# Patient Record
Sex: FEMALE | Race: WHITE | Employment: OTHER | URBAN - METROPOLITAN AREA
[De-identification: names, ages, dates, MRNs, and addresses within clinical notes are randomized per-mention and may not be internally consistent; named-entity substitution may affect disease eponyms.]

---

## 2017-08-23 ENCOUNTER — CARE COORDINATION (OUTPATIENT)
Dept: FAMILY MEDICINE CLINIC | Age: 62
End: 2017-08-23

## 2017-09-20 ENCOUNTER — CARE COORDINATION (OUTPATIENT)
Dept: FAMILY MEDICINE CLINIC | Age: 62
End: 2017-09-20

## 2019-06-23 ENCOUNTER — HOSPITAL ENCOUNTER (EMERGENCY)
Age: 64
Discharge: HOME | End: 2019-06-23
Payer: MEDICARE

## 2019-06-23 ENCOUNTER — APPOINTMENT (OUTPATIENT)
Dept: CT IMAGING | Age: 64
End: 2019-06-23
Payer: MEDICARE

## 2019-06-23 VITALS
TEMPERATURE: 98.3 F | HEART RATE: 90 BPM | BODY MASS INDEX: 28.52 KG/M2 | SYSTOLIC BLOOD PRESSURE: 158 MMHG | HEIGHT: 62 IN | WEIGHT: 155 LBS | RESPIRATION RATE: 18 BRPM | DIASTOLIC BLOOD PRESSURE: 82 MMHG | OXYGEN SATURATION: 94 %

## 2019-06-23 DIAGNOSIS — R19.7 NAUSEA VOMITING AND DIARRHEA: ICD-10-CM

## 2019-06-23 DIAGNOSIS — N30.00 ACUTE CYSTITIS WITHOUT HEMATURIA: Primary | ICD-10-CM

## 2019-06-23 DIAGNOSIS — R11.2 NAUSEA VOMITING AND DIARRHEA: ICD-10-CM

## 2019-06-23 DIAGNOSIS — R73.9 HYPERGLYCEMIA: ICD-10-CM

## 2019-06-23 LAB
A/G RATIO: 1.1 (ref 1.1–2.2)
ALBUMIN SERPL-MCNC: 4 G/DL (ref 3.4–5)
ALP BLD-CCNC: 117 U/L (ref 40–129)
ALT SERPL-CCNC: 13 U/L (ref 10–40)
ANION GAP SERPL CALCULATED.3IONS-SCNC: 13 MMOL/L (ref 3–16)
AST SERPL-CCNC: 13 U/L (ref 15–37)
BACTERIA: ABNORMAL /HPF
BASOPHILS ABSOLUTE: 0.1 K/UL (ref 0–0.2)
BASOPHILS RELATIVE PERCENT: 1.5 %
BILIRUB SERPL-MCNC: 0.4 MG/DL (ref 0–1)
BILIRUBIN URINE: NEGATIVE
BLOOD, URINE: ABNORMAL
BUN BLDV-MCNC: 18 MG/DL (ref 7–20)
CALCIUM SERPL-MCNC: 9.4 MG/DL (ref 8.3–10.6)
CHLORIDE BLD-SCNC: 92 MMOL/L (ref 99–110)
CHP ED QC CHECK: NORMAL
CLARITY: ABNORMAL
CO2: 25 MMOL/L (ref 21–32)
COLOR: YELLOW
CREAT SERPL-MCNC: 0.6 MG/DL (ref 0.6–1.2)
EOSINOPHILS ABSOLUTE: 0.1 K/UL (ref 0–0.6)
EOSINOPHILS RELATIVE PERCENT: 2 %
EPITHELIAL CELLS, UA: ABNORMAL /HPF
GFR AFRICAN AMERICAN: >60
GFR NON-AFRICAN AMERICAN: >60
GLOBULIN: 3.7 G/DL
GLUCOSE BLD-MCNC: 389 MG/DL
GLUCOSE BLD-MCNC: 389 MG/DL (ref 70–99)
GLUCOSE BLD-MCNC: 511 MG/DL (ref 70–99)
GLUCOSE URINE: >=1000 MG/DL
HCT VFR BLD CALC: 36.3 % (ref 36–48)
HEMOGLOBIN: 12.1 G/DL (ref 12–16)
KETONES, URINE: ABNORMAL MG/DL
LEUKOCYTE ESTERASE, URINE: ABNORMAL
LIPASE: 6 U/L (ref 13–60)
LYMPHOCYTES ABSOLUTE: 1.6 K/UL (ref 1–5.1)
LYMPHOCYTES RELATIVE PERCENT: 23.9 %
MCH RBC QN AUTO: 28.2 PG (ref 26–34)
MCHC RBC AUTO-ENTMCNC: 33.4 G/DL (ref 31–36)
MCV RBC AUTO: 84.4 FL (ref 80–100)
MICROSCOPIC EXAMINATION: YES
MONOCYTES ABSOLUTE: 0.5 K/UL (ref 0–1.3)
MONOCYTES RELATIVE PERCENT: 6.8 %
NEUTROPHILS ABSOLUTE: 4.5 K/UL (ref 1.7–7.7)
NEUTROPHILS RELATIVE PERCENT: 65.8 %
NITRITE, URINE: NEGATIVE
PDW BLD-RTO: 14.9 % (ref 12.4–15.4)
PERFORMED ON: ABNORMAL
PH UA: 7 (ref 5–8)
PLATELET # BLD: 244 K/UL (ref 135–450)
PMV BLD AUTO: 7.8 FL (ref 5–10.5)
POTASSIUM REFLEX MAGNESIUM: 4.5 MMOL/L (ref 3.5–5.1)
PROTEIN UA: 100 MG/DL
RBC # BLD: 4.3 M/UL (ref 4–5.2)
RBC UA: ABNORMAL /HPF (ref 0–2)
SODIUM BLD-SCNC: 130 MMOL/L (ref 136–145)
SPECIFIC GRAVITY UA: 1.01 (ref 1–1.03)
TOTAL PROTEIN: 7.7 G/DL (ref 6.4–8.2)
URINE REFLEX TO CULTURE: YES
URINE TYPE: ABNORMAL
UROBILINOGEN, URINE: 0.2 E.U./DL
WBC # BLD: 6.8 K/UL (ref 4–11)
WBC UA: ABNORMAL /HPF (ref 0–5)

## 2019-06-23 PROCEDURE — 2580000003 HC RX 258: Performed by: NURSE PRACTITIONER

## 2019-06-23 PROCEDURE — 80053 COMPREHEN METABOLIC PANEL: CPT

## 2019-06-23 PROCEDURE — 87086 URINE CULTURE/COLONY COUNT: CPT

## 2019-06-23 PROCEDURE — 85025 COMPLETE CBC W/AUTO DIFF WBC: CPT

## 2019-06-23 PROCEDURE — 6370000000 HC RX 637 (ALT 250 FOR IP): Performed by: NURSE PRACTITIONER

## 2019-06-23 PROCEDURE — 96374 THER/PROPH/DIAG INJ IV PUSH: CPT

## 2019-06-23 PROCEDURE — 96375 TX/PRO/DX INJ NEW DRUG ADDON: CPT

## 2019-06-23 PROCEDURE — 87186 SC STD MICRODIL/AGAR DIL: CPT

## 2019-06-23 PROCEDURE — 81001 URINALYSIS AUTO W/SCOPE: CPT

## 2019-06-23 PROCEDURE — 74176 CT ABD & PELVIS W/O CONTRAST: CPT

## 2019-06-23 PROCEDURE — 96361 HYDRATE IV INFUSION ADD-ON: CPT

## 2019-06-23 PROCEDURE — 96372 THER/PROPH/DIAG INJ SC/IM: CPT

## 2019-06-23 PROCEDURE — 99284 EMERGENCY DEPT VISIT MOD MDM: CPT

## 2019-06-23 PROCEDURE — 83690 ASSAY OF LIPASE: CPT

## 2019-06-23 PROCEDURE — 6360000002 HC RX W HCPCS: Performed by: NURSE PRACTITIONER

## 2019-06-23 PROCEDURE — 87077 CULTURE AEROBIC IDENTIFY: CPT

## 2019-06-23 RX ORDER — ONDANSETRON 2 MG/ML
4 INJECTION INTRAMUSCULAR; INTRAVENOUS ONCE
Status: COMPLETED | OUTPATIENT
Start: 2019-06-23 | End: 2019-06-23

## 2019-06-23 RX ORDER — SULFAMETHOXAZOLE AND TRIMETHOPRIM 800; 160 MG/1; MG/1
1 TABLET ORAL 2 TIMES DAILY
Qty: 14 TABLET | Refills: 0 | Status: SHIPPED | OUTPATIENT
Start: 2019-06-23 | End: 2019-06-30

## 2019-06-23 RX ORDER — 0.9 % SODIUM CHLORIDE 0.9 %
1000 INTRAVENOUS SOLUTION INTRAVENOUS ONCE
Status: COMPLETED | OUTPATIENT
Start: 2019-06-23 | End: 2019-06-23

## 2019-06-23 RX ORDER — MORPHINE SULFATE 4 MG/ML
4 INJECTION, SOLUTION INTRAMUSCULAR; INTRAVENOUS ONCE
Status: COMPLETED | OUTPATIENT
Start: 2019-06-23 | End: 2019-06-23

## 2019-06-23 RX ORDER — BUSPIRONE HYDROCHLORIDE 10 MG/1
10 TABLET ORAL 3 TIMES DAILY
Status: ON HOLD | COMMUNITY
End: 2021-11-24 | Stop reason: HOSPADM

## 2019-06-23 RX ORDER — BLOOD-GLUCOSE METER
1 EACH MISCELLANEOUS DAILY
Qty: 1 KIT | Refills: 0 | Status: ON HOLD | OUTPATIENT
Start: 2019-06-23 | End: 2019-07-26 | Stop reason: HOSPADM

## 2019-06-23 RX ORDER — PHENAZOPYRIDINE HYDROCHLORIDE 200 MG/1
200 TABLET, FILM COATED ORAL 3 TIMES DAILY PRN
Qty: 6 TABLET | Refills: 0 | Status: SHIPPED | OUTPATIENT
Start: 2019-06-23 | End: 2019-06-26

## 2019-06-23 RX ORDER — PHENAZOPYRIDINE HYDROCHLORIDE 100 MG/1
200 TABLET, FILM COATED ORAL ONCE
Status: COMPLETED | OUTPATIENT
Start: 2019-06-23 | End: 2019-06-23

## 2019-06-23 RX ADMIN — ONDANSETRON 4 MG: 2 INJECTION INTRAMUSCULAR; INTRAVENOUS at 14:09

## 2019-06-23 RX ADMIN — MORPHINE SULFATE 4 MG: 4 INJECTION INTRAVENOUS at 14:07

## 2019-06-23 RX ADMIN — SODIUM CHLORIDE 1000 ML: 9 INJECTION, SOLUTION INTRAVENOUS at 14:12

## 2019-06-23 RX ADMIN — INSULIN HUMAN 10 UNITS: 100 INJECTION, SOLUTION PARENTERAL at 15:44

## 2019-06-23 RX ADMIN — PHENAZOPYRIDINE HYDROCHLORIDE 200 MG: 100 TABLET ORAL at 15:46

## 2019-06-23 ASSESSMENT — ENCOUNTER SYMPTOMS
ABDOMINAL PAIN: 1
NAUSEA: 1
VOMITING: 1
SHORTNESS OF BREATH: 0
BACK PAIN: 0
DIARRHEA: 1
COUGH: 0

## 2019-06-23 ASSESSMENT — PAIN DESCRIPTION - PAIN TYPE: TYPE: ACUTE PAIN

## 2019-06-23 ASSESSMENT — PAIN SCALES - GENERAL
PAINLEVEL_OUTOF10: 7
PAINLEVEL_OUTOF10: 8

## 2019-06-23 NOTE — ED NOTES
Reviewed discharge instructions with pt, verbalized understanding,  Denies questions at this time. Ambulated off unit without assistance.       Lisa Dickens RN  06/23/19 8941

## 2019-06-23 NOTE — ED PROVIDER NOTES
[address] ED  eMERGENCY dEPARTMENT eNCOUnter        Pt Name: Ross Babinski  MRN: [id number]  [place] 1955  Date of evaluation: 6/23/2019  Provider: MOHAN Gomez CNP  PCP: Arlyn Harper DO  ED Attending: No att. providers found    [address] [place] [address]       Chief Complaint   Patient presents with    Urinary Tract Infection     Pt states she has a really super bad infection in her urine burns so bad to urinate and for about 30 minutes afterwards    Diarrhea     pt also states that she has an infection in her bowels, states she was to start medicine 3 days ago but hasnt yet. Pt states she is post op 6 mos from toe amputation and has used recent atbs. HISTORY OF PRESENT ILLNESS   (Location/Symptom, Timing/Onset, Context/Setting, Quality, Duration, Modifying Factors, Severity)  Note limiting factors. Ross Babinski is a 60 y.o. female presents to the emergency department with complaints of dysuria and abdominal pain. Patient reports that for the last 4 days she has had burning with urination as well as urinary frequency and urgency. She recently moved back here from [place]. She reports that 2 weeks ago she was seen at [facility] in [facility] and was told that she had a bowel infection. She states that she signed out [address] as she did not wish to be admitted at that time. She was not given any antibiotics at discharge. She reports continued diarrhea, nausea and vomiting. She also reports 7 out of 10 abdominal pain. She does report chills but no measured fevers. She also had toe amputations of the right foot about 4 months ago. Patient does take Percocet for pain and reports that she ran out of her Percocet yesterday. Nursing Notes were all reviewed and agreed with or any disagreements were addressed  in the HPI.     REVIEW OF SYSTEMS    (2-9 systems for level 4, 10 or more for level 5)     Review of Systems   Constitutional: Positive for appetite change, chills and fatigue. Negative for fever. HENT: Negative. Respiratory: Negative for cough and shortness of breath. Cardiovascular: Negative for chest pain. Gastrointestinal: Positive for abdominal pain, diarrhea, nausea and vomiting. Genitourinary: Positive for dysuria, frequency, hematuria and urgency. Negative for pelvic pain. Musculoskeletal: Negative for back pain. Skin: Negative. Neurological: Negative for dizziness, weakness and headaches. Psychiatric/Behavioral: Negative. Positives and Pertinent negatives as per HPI. Except as noted abovein the ROS, all other systems were reviewed and negative. PAST MEDICAL HISTORY     Past Medical History:   Diagnosis Date    Bipolar affective disorder ([facility] 9/28/2015    Depression     [facility]     Hyperlipidemia     Hypertension     Insomnia     Neuropathy (HCC)          SURGICAL HISTORY     Past Surgical History:   Procedure Laterality Date    CHOLECYSTECTOMY           CURRENTMEDICATIONS       Previous Medications    ASPIRIN 325 MG TABLET    Take 1 tablet by mouth daily    BUSPIRONE (BUSPAR) 10 MG TABLET    Take 10 mg by mouth 3 times daily    CARBAMAZEPINE (TEGRETOL) 200 MG TABLET    Take 1 tablet by mouth 3 times daily    CLOTRIMAZOLE-BETAMETHASONE (LOTRISONE) 1-0.05 % CREAM    Apply externally 2 times daily for 2 weeks. CYCLOBENZAPRINE (FLEXERIL) 10 MG TABLET    Take 1 tablet by mouth daily as needed for Muscle spasms    FLUOXETINE (PROZAC) 20 MG TABLET    Take 1 tablet by mouth daily    FUROSEMIDE (LASIX) 40 MG TABLET    Take 1 tablet by mouth daily    GABAPENTIN (NEURONTIN) 800 MG TABLET    Take 1 tablet by mouth 4 times daily    GLUCOSE BLOOD VI TEST STRIPS (EVA CONTOUR TEST) STRIP    1 each by In Vitro route daily As needed.     HYDROCODONE-ACETAMINOPHEN (NORCO) 5-325 MG PER TABLET    Take 1 tablet by mouth every 6 hours as needed for Pain    HYDROXYZINE (ATARAX) 25 MG TABLET    Take 1 tablet by mouth three times daily    INSULIN ASPART (NOVOLOG FLEXPEN) 100 UNIT/ML INJECTION PEN    60-70 units before meals    INSULIN ASPART PROTAMINE-INSULIN ASPART (NOVOLOG MIX 70/30) (70-30) 100 UNIT/ML INJECTION    INJECT 24 UNITS SUBCUTANEOUSLY TWO TIMES A DAY    INSULIN PEN NEEDLE 32G X 4 MM MISC    4 times a day    LISINOPRIL (PRINIVIL;ZESTRIL) 40 MG TABLET    Take 1 tablet by mouth daily    OMEPRAZOLE (PRILOSEC) 20 MG DELAYED RELEASE CAPSULE    Take 1 capsule by mouth Daily    OXYCODONE-ACETAMINOPHEN (PERCOCET) 7.5-325 MG PER TABLET    Take 1 tablet by mouth daily as needed for Pain . PRAVASTATIN (PRAVACHOL) 40 MG TABLET    TAKE ONE TABLET BY MOUTH DAILY    PROMETHAZINE (PHENERGAN) 25 MG TABLET    Take 1 tablet by mouth every 6 hours as needed for Nausea    QUETIAPINE (SEROQUEL) 300 MG TABLET    TAKE ONE TABLET BY MOUTH ONCE NIGHTLY    TRAZODONE (DESYREL) 150 MG TABLET    Take 1 tablet by mouth nightly         ALLERGIES     Iv contrast [iodides] and Pcn [penicillins]    FAMILYHISTORY       Family History   Problem Relation Age of Onset    Heart Disease Mother     Heart Disease Father           SOCIAL HISTORY       Social History     Socioeconomic History    Marital status:       Spouse name: Not on file    Number of children: Not on file    Years of education: Not on file    Highest education level: Not on file   Occupational History    Not on file   Social Needs    Financial resource strain: Not on file    Food insecurity:     Worry: Not on file     Inability: Not on file    Transportation needs:     Medical: Not on file     Non-medical: Not on file   Tobacco Use    Smoking status: Current Every Day Smoker     Packs/day: 1.00     Years: 40.00     Pack years: 40.00    Smokeless tobacco: Never Used   Substance and Sexual Activity    Alcohol use: No    Drug use: No    Sexual activity: Never   Lifestyle    Physical activity:     Days per week: Not on file     Minutes per session: Not on file    Stress: Not on file   Relationships    Social connections:     Talks on phone: Not on file     Gets together: Not on file     Attends Voodoo service: Not on file     Active member of club or organization: Not on file     Attends meetings of clubs or organizations: Not on file     Relationship status: Not on file    Intimate partner violence:     Fear of current or ex partner: Not on file     Emotionally abused: Not on file     Physically abused: Not on file     Forced sexual activity: Not on file   Other Topics Concern    Not on file   Social History Narrative    Not on file       SCREENINGS             PHYSICAL EXAM    (up to 7 for level 4, 8 or more for level 5)     ED Triage Vitals [06/23/19 1226]   BP Temp Temp Source Pulse Resp SpO2 Height Weight   (!) 154/79 98.3 °F (36.8 °C) Oral 93 16 94 % 5' 2\" (1.575 m) 155 lb (70.3 kg)       Physical Exam   Constitutional: She appears well-developed and well-nourished. HENT:   Head: Normocephalic and atraumatic. Right Ear: External ear normal.   Left Ear: External ear normal.   Nose: Nose normal.   Eyes: Conjunctivae are normal.   Neck: Normal range of motion. Cardiovascular: Normal rate and regular rhythm. Pulmonary/Chest: Effort normal and breath sounds normal.   Abdominal: Soft. Normal appearance and bowel sounds are normal. There is generalized tenderness. There is CVA tenderness. There is no rigidity, no rebound and no guarding. Musculoskeletal: Normal range of motion. Neurological: She is alert. Skin: Skin is warm and dry. Psychiatric: She has a normal mood and affect. Her behavior is normal.   Nursing note and vitals reviewed.       DIAGNOSTIC RESULTS   LABS:    Labs Reviewed   COMPREHENSIVE METABOLIC PANEL W/ REFLEX TO MG FOR LOW K - Abnormal; Notable for the following components:       Result Value    Sodium 130 (*)     Chloride 92 (*)     Glucose 511 (*)     AST 13 (*)     All other components within normal limits    Narrative: Performed at:  [facility],  [place], [place]   Phone [phone]   LIPASE - Abnormal; Notable for the following components:    Lipase 6.0 (*)     All other components within normal limits    Narrative:     Performed at:  [facility],  [place], [place] [facility]   Phone [phone]   URINE RT REFLEX TO CULTURE - Abnormal; Notable for the following components:    Clarity, UA CLOUDY (*)     Glucose, Ur >=1000 (*)     Ketones, Urine TRACE (*)     Blood, Urine MODERATE (*)     Protein,  (*)     Leukocyte Esterase, Urine TRACE (*)     All other components within normal limits    Narrative:     Performed at:  [facility],  [place] [address]   Phone [phone]   MICROSCOPIC URINALYSIS - Abnormal; Notable for the following components:    WBC, UA 20-50 (*)     RBC, UA 10-20 (*)     Bacteria, UA 3+ (*)     All other components within normal limits    Narrative:     Performed at:  [facility],  [place] [address] [facility]   Phone [phone]   POCT GLUCOSE - Abnormal; Notable for the following components:    POC Glucose 389 (*)     All other components within normal limits    Narrative:     Performed at:  [facility],  [place], [place]   Phone [phone]   URINE CULTURE   CBC WITH AUTO DIFFERENTIAL    Narrative:     Performed at:  [facility],  [place] [address] [facility]   Phone [phone]   POCT GLUCOSE       All other labs were within normal range or not returned as of this dictation. EKG: All EKG's are interpreted by the Emergency Department Physician who either signs orCo-signs this chart in the absence of a cardiologist.  Please see their note for interpretation of EKG.       RADIOLOGY:   Non-plain film (e.g., bloody stool, fever, changing or worsening pain, vomiting) that necessitate immediate return. FINAL Impression    1. Acute cystitis without hematuria    2. Nausea vomiting and diarrhea    3. Hyperglycemia        Blood pressure (!) 158/82, pulse 90, temperature 98.3 °F (36.8 °C), temperature source Oral, resp. rate 18, height 5' 2\" (1.575 m), weight 155 lb (70.3 kg), SpO2 94 %, not currently breastfeeding. The patient tolerated their visit well. The attending was available for consultation. The patientand / or the family were informed of the results of any tests, a time was given to answer questions, a plan was proposed and they agreed with plan. FINAL IMPRESSION      1. Acute cystitis without hematuria    2. Nausea vomiting and diarrhea    3.  Hyperglycemia          DISPOSITION/PLAN   DISPOSITION Decision To Discharge 06/23/2019 04:44:26 PM      PATIENT REFERRED TO:  [address] [address] [facility] [place] [phone]  Schedule an appointment as soon as possible for a visit   For follow up care, To establish primary care    [facility] ED  [address]  [phone]  Go to   As needed, If symptoms worsen      DISCHARGE MEDICATIONS:  New Prescriptions    BLOOD GLUCOSE MONITORING SUPPL (PRODIGY AUTOCODE BLOOD GLUCOSE) W/DEVICE KIT    1 kit by Does not apply route daily    PHENAZOPYRIDINE (PYRIDIUM) 200 MG TABLET    Take 1 tablet by mouth 3 times daily as needed for Pain (bladder spasm/pain)    SULFAMETHOXAZOLE-TRIMETHOPRIM (BACTRIM DS) 800-160 MG PER TABLET    Take 1 tablet by mouth 2 times daily for 7 days       DISCONTINUED MEDICATIONS:  Discontinued Medications    No medications on file              (Please note that portions ofthis note were completed with a voice recognition program.  Efforts were made to edit the dictations but occasionally words are mis-transcribed.)    Zachery Pablo, MOHAN - CNP (electronically

## 2019-06-25 ENCOUNTER — APPOINTMENT (OUTPATIENT)
Dept: GENERAL RADIOLOGY | Age: 64
End: 2019-06-25
Payer: MEDICARE

## 2019-06-25 ENCOUNTER — HOSPITAL ENCOUNTER (EMERGENCY)
Age: 64
Discharge: HOME | End: 2019-06-25
Attending: EMERGENCY MEDICINE
Payer: MEDICARE

## 2019-06-25 VITALS
OXYGEN SATURATION: 94 % | HEIGHT: 62 IN | HEART RATE: 95 BPM | BODY MASS INDEX: 27.23 KG/M2 | DIASTOLIC BLOOD PRESSURE: 74 MMHG | TEMPERATURE: 98.4 F | WEIGHT: 148 LBS | RESPIRATION RATE: 20 BRPM | SYSTOLIC BLOOD PRESSURE: 125 MMHG

## 2019-06-25 DIAGNOSIS — R53.83 FATIGUE, UNSPECIFIED TYPE: Primary | ICD-10-CM

## 2019-06-25 DIAGNOSIS — Z87.440 HISTORY OF UTI: ICD-10-CM

## 2019-06-25 DIAGNOSIS — R73.9 ELEVATED BLOOD SUGAR: ICD-10-CM

## 2019-06-25 LAB
A/G RATIO: 1.1 (ref 1.1–2.2)
ALBUMIN SERPL-MCNC: 4.1 G/DL (ref 3.4–5)
ALP BLD-CCNC: 123 U/L (ref 40–129)
ALT SERPL-CCNC: 12 U/L (ref 10–40)
ANION GAP SERPL CALCULATED.3IONS-SCNC: 14 MMOL/L (ref 3–16)
AST SERPL-CCNC: 10 U/L (ref 15–37)
BASE EXCESS VENOUS: 1.4 MMOL/L (ref -3–3)
BASOPHILS ABSOLUTE: 0 K/UL (ref 0–0.2)
BASOPHILS RELATIVE PERCENT: 0.7 %
BETA-HYDROXYBUTYRATE: 0.1 MMOL/L (ref 0–0.27)
BILIRUB SERPL-MCNC: 0.4 MG/DL (ref 0–1)
BILIRUBIN URINE: NEGATIVE
BLOOD, URINE: ABNORMAL
BUN BLDV-MCNC: 21 MG/DL (ref 7–20)
CALCIUM SERPL-MCNC: 9.6 MG/DL (ref 8.3–10.6)
CARBOXYHEMOGLOBIN: 2.8 % (ref 0–1.5)
CHLORIDE BLD-SCNC: 92 MMOL/L (ref 99–110)
CLARITY: ABNORMAL
CO2: 27 MMOL/L (ref 21–32)
COLOR: ABNORMAL
COMMENT UA: NORMAL
CREAT SERPL-MCNC: 0.8 MG/DL (ref 0.6–1.2)
EOSINOPHILS ABSOLUTE: 0.2 K/UL (ref 0–0.6)
EOSINOPHILS RELATIVE PERCENT: 3.7 %
GFR AFRICAN AMERICAN: >60
GFR NON-AFRICAN AMERICAN: >60
GLOBULIN: 3.6 G/DL
GLUCOSE BLD-MCNC: 188 MG/DL (ref 70–99)
GLUCOSE BLD-MCNC: 287 MG/DL (ref 70–99)
GLUCOSE BLD-MCNC: 534 MG/DL (ref 70–99)
GLUCOSE BLD-MCNC: 574 MG/DL (ref 70–99)
GLUCOSE BLD-MCNC: 92 MG/DL (ref 70–99)
GLUCOSE URINE: 100 MG/DL
HCO3 VENOUS: 28 MMOL/L (ref 23–29)
HCT VFR BLD CALC: 37 % (ref 36–48)
HEMOGLOBIN: 12.3 G/DL (ref 12–16)
KETONES, URINE: NEGATIVE MG/DL
LACTIC ACID: 1.9 MMOL/L (ref 0.4–2)
LACTIC ACID: 2.2 MMOL/L (ref 0.4–2)
LACTIC ACID: 2.3 MMOL/L (ref 0.4–2)
LEUKOCYTE ESTERASE, URINE: ABNORMAL
LYMPHOCYTES ABSOLUTE: 2.3 K/UL (ref 1–5.1)
LYMPHOCYTES RELATIVE PERCENT: 37 %
MCH RBC QN AUTO: 28 PG (ref 26–34)
MCHC RBC AUTO-ENTMCNC: 33.3 G/DL (ref 31–36)
MCV RBC AUTO: 84 FL (ref 80–100)
METHEMOGLOBIN VENOUS: 0.6 %
MICROSCOPIC EXAMINATION: YES
MONOCYTES ABSOLUTE: 0.6 K/UL (ref 0–1.3)
MONOCYTES RELATIVE PERCENT: 9.4 %
NEUTROPHILS ABSOLUTE: 3 K/UL (ref 1.7–7.7)
NEUTROPHILS RELATIVE PERCENT: 49.2 %
NITRITE, URINE: POSITIVE
O2 CONTENT, VEN: 16 VOL %
O2 SAT, VEN: 90 %
O2 THERAPY: ABNORMAL
ORGANISM: ABNORMAL
PCO2, VEN: 53 MMHG (ref 40–50)
PDW BLD-RTO: 14.7 % (ref 12.4–15.4)
PERFORMED ON: ABNORMAL
PERFORMED ON: NORMAL
PH UA: 5.5 (ref 5–8)
PH VENOUS: 7.34 (ref 7.35–7.45)
PLATELET # BLD: 235 K/UL (ref 135–450)
PMV BLD AUTO: 7.9 FL (ref 5–10.5)
PO2, VEN: 62.1 MMHG (ref 25–40)
POTASSIUM REFLEX MAGNESIUM: 4 MMOL/L (ref 3.5–5.1)
PROTEIN UA: 30 MG/DL
RBC # BLD: 4.4 M/UL (ref 4–5.2)
RBC UA: NORMAL /HPF (ref 0–2)
SODIUM BLD-SCNC: 133 MMOL/L (ref 136–145)
SPECIFIC GRAVITY UA: 1.01 (ref 1–1.03)
TCO2 CALC VENOUS: 30 MMOL/L
TOTAL PROTEIN: 7.7 G/DL (ref 6.4–8.2)
URINE CULTURE, ROUTINE: ABNORMAL
URINE CULTURE, ROUTINE: ABNORMAL
URINE REFLEX TO CULTURE: YES
URINE TYPE: ABNORMAL
UROBILINOGEN, URINE: 1 E.U./DL
WBC # BLD: 6.2 K/UL (ref 4–11)
WBC UA: NORMAL /HPF (ref 0–5)

## 2019-06-25 PROCEDURE — 71045 X-RAY EXAM CHEST 1 VIEW: CPT

## 2019-06-25 PROCEDURE — 2580000003 HC RX 258: Performed by: NURSE PRACTITIONER

## 2019-06-25 PROCEDURE — 96374 THER/PROPH/DIAG INJ IV PUSH: CPT

## 2019-06-25 PROCEDURE — 81001 URINALYSIS AUTO W/SCOPE: CPT

## 2019-06-25 PROCEDURE — 99285 EMERGENCY DEPT VISIT HI MDM: CPT

## 2019-06-25 PROCEDURE — 87077 CULTURE AEROBIC IDENTIFY: CPT

## 2019-06-25 PROCEDURE — 80053 COMPREHEN METABOLIC PANEL: CPT

## 2019-06-25 PROCEDURE — 96361 HYDRATE IV INFUSION ADD-ON: CPT

## 2019-06-25 PROCEDURE — 87040 BLOOD CULTURE FOR BACTERIA: CPT

## 2019-06-25 PROCEDURE — 83605 ASSAY OF LACTIC ACID: CPT

## 2019-06-25 PROCEDURE — 36415 COLL VENOUS BLD VENIPUNCTURE: CPT

## 2019-06-25 PROCEDURE — 85025 COMPLETE CBC W/AUTO DIFF WBC: CPT

## 2019-06-25 PROCEDURE — 82010 KETONE BODYS QUAN: CPT

## 2019-06-25 PROCEDURE — 71046 X-RAY EXAM CHEST 2 VIEWS: CPT

## 2019-06-25 PROCEDURE — 87086 URINE CULTURE/COLONY COUNT: CPT

## 2019-06-25 PROCEDURE — 87186 SC STD MICRODIL/AGAR DIL: CPT

## 2019-06-25 PROCEDURE — 6370000000 HC RX 637 (ALT 250 FOR IP): Performed by: NURSE PRACTITIONER

## 2019-06-25 PROCEDURE — 82803 BLOOD GASES ANY COMBINATION: CPT

## 2019-06-25 RX ORDER — 0.9 % SODIUM CHLORIDE 0.9 %
1000 INTRAVENOUS SOLUTION INTRAVENOUS ONCE
Status: COMPLETED | OUTPATIENT
Start: 2019-06-25 | End: 2019-06-25

## 2019-06-25 RX ORDER — 0.9 % SODIUM CHLORIDE 0.9 %
2000 INTRAVENOUS SOLUTION INTRAVENOUS ONCE
Status: COMPLETED | OUTPATIENT
Start: 2019-06-25 | End: 2019-06-25

## 2019-06-25 RX ORDER — CIPROFLOXACIN 500 MG/1
500 TABLET, FILM COATED ORAL ONCE
Status: COMPLETED | OUTPATIENT
Start: 2019-06-25 | End: 2019-06-25

## 2019-06-25 RX ADMIN — SODIUM CHLORIDE 1000 ML: 9 INJECTION, SOLUTION INTRAVENOUS at 19:55

## 2019-06-25 RX ADMIN — SODIUM CHLORIDE 2000 ML: 9 INJECTION, SOLUTION INTRAVENOUS at 16:44

## 2019-06-25 RX ADMIN — INSULIN HUMAN 10 UNITS: 100 INJECTION, SOLUTION PARENTERAL at 16:43

## 2019-06-25 RX ADMIN — CIPROFLOXACIN HYDROCHLORIDE 500 MG: 500 TABLET, FILM COATED ORAL at 19:29

## 2019-06-25 ASSESSMENT — ENCOUNTER SYMPTOMS
ABDOMINAL PAIN: 0
SHORTNESS OF BREATH: 0

## 2019-06-25 NOTE — ED NOTES
Report given to [facility].      Maximus MAGED Champion  06/25/19 1913

## 2019-06-25 NOTE — ED PROVIDER NOTES
[address] ED  eMERGENCY dEPARTMENT eNCOUnter        Pt Name: Tracy Lancaster  MRN: [id number]  [place] 1955  Date of evaluation: 6/25/2019  Provider: MOHAN Aguayo - CNP  PCP: Grace Hernandez DO  ED Attending: No att. providers found    [facility]       Chief Complaint   Patient presents with    Blood Sugar Problem     patient became lethargic; glucose checked at 573. patient currently on antibiotics for UTI, on 3rd day of tx. Patient has recent chills usure about fevers. denies chest pain.  Fatigue       HISTORY OF PRESENT ILLNESS   (Location/Symptom, Timing/Onset, Context/Setting, Quality, Duration, Modifying Factors, Severity)  Note limiting factors. Tracy Lancaster is a 60 y.o. female for elevated blood sugars and fatigue. Onset was the past few days. Duration has been since the onset. Context includes pt states she is being treated for a uti and taking bactrim. She reports that she is using her insulin and checking her sugar and reports that her sugar is rarely below 500. Alleviating factors include nothing. Aggravating factors include nothingn. Pain is 0/10. nothing has been used for pain today. Nursing Notes were all reviewed and agreed with or any disagreements were addressed  in the HPI. REVIEW OF SYSTEMS  (2-9 systems for level 4, 10 or more for level 5)     Review of Systems   Constitutional: Positive for chills and fatigue. Negative for fever. Elevated blood sugar   Respiratory: Negative for shortness of breath. Cardiovascular: Negative for chest pain. Gastrointestinal: Negative for abdominal pain. Genitourinary: Negative for difficulty urinating. Neurological: Positive for weakness. All other systems reviewed and are negative. Positivesand Pertinent negatives as per HPI. Except as noted above in the ROS, all other systems were reviewed and negative.        PAST MEDICAL HISTORY     Past Medical History:   Diagnosis Date    Bipolar affective disorder ([facility] [address] [address] 9/28/2015    Depression     Diabetes ([facility]     Hyperlipidemia     Hypertension     Insomnia     Neuropathy          SURGICAL HISTORY       Past Surgical History:   Procedure Laterality Date    CHOLECYSTECTOMY           CURRENT MEDICATIONS       Discharge Medication List as of 6/25/2019 10:04 PM      CONTINUE these medications which have NOT CHANGED    Details   busPIRone (BUSPAR) 10 MG tablet Take 10 mg by mouth 3 times dailyHistorical Med      sulfamethoxazole-trimethoprim (BACTRIM DS) 800-160 MG per tablet Take 1 tablet by mouth 2 times daily for 7 days, Disp-14 tablet, R-0Print      Blood Glucose Monitoring Suppl (PRODIGY AUTOCODE BLOOD GLUCOSE) w/Device KIT DAILY Starting Sun 6/23/2019, Disp-1 kit, R-0, Print      phenazopyridine (PYRIDIUM) 200 MG tablet Take 1 tablet by mouth 3 times daily as needed for Pain (bladder spasm/pain), Disp-6 tablet, R-0Print      oxyCODONE-acetaminophen (PERCOCET) 7.5-325 MG per tablet Take 1 tablet by mouth daily as needed for Pain ., Disp-15 tablet, R-0Normal      insulin aspart protamine-insulin aspart (NOVOLOG MIX 70/30) (70-30) 100 UNIT/ML injection INJECT 24 UNITS SUBCUTANEOUSLY TWO TIMES A DAY, Disp-3 vial, R-5      QUEtiapine (SEROQUEL) 300 MG tablet TAKE ONE TABLET BY MOUTH ONCE NIGHTLY, Disp-90 tablet, R-2      pravastatin (PRAVACHOL) 40 MG tablet TAKE ONE TABLET BY MOUTH DAILY, Disp-90 tablet, R-2      omeprazole (PRILOSEC) 20 MG delayed release capsule Take 1 capsule by mouth Daily, Disp-90 capsule, R-2      FLUoxetine (PROZAC) 20 MG tablet Take 1 tablet by mouth daily, Disp-90 tablet, R-5      furosemide (LASIX) 40 MG tablet Take 1 tablet by mouth daily, Disp-90 tablet, R-1      hydrOXYzine (ATARAX) 25 MG tablet Take 1 tablet by mouth three times daily, Disp-270 tablet, R-1      promethazine (PHENERGAN) 25 MG tablet Take 1 tablet by mouth every 6 hours as needed for Nausea, Disp-90 tablet, R-1      carBAMazepine (TEGRETOL) 200 MG tablet Take 1 tablet by mouth 3 times daily, Disp-30 tablet, R-1      HYDROcodone-acetaminophen (NORCO) 5-325 MG per tablet Take 1 tablet by mouth every 6 hours as needed for Pain, Disp-4 tablet, R-0      insulin aspart (NOVOLOG FLEXPEN) 100 UNIT/ML injection pen 60-70 units before meals, Disp-20 Pen, R-0      clotrimazole-betamethasone (LOTRISONE) 1-0.05 % cream Apply externally 2 times daily for 2 weeks. , Disp-1 Tube, R-0, Normal      glucose blood VI test strips (EVA CONTOUR TEST) strip DAILY Starting 8/29/2016, Until Discontinued, Disp-100 each, R-0, NormalAs needed. cyclobenzaprine (FLEXERIL) 10 MG tablet Take 1 tablet by mouth daily as needed for Muscle spasms, Disp-90 tablet, R-0      gabapentin (NEURONTIN) 800 MG tablet Take 1 tablet by mouth 4 times daily, Disp-360 tablet, R-0      Insulin Pen Needle 32G X 4 MM MISC Disp-150 each, R-3, Normal4 times a day      traZODone (DESYREL) 150 MG tablet Take 1 tablet by mouth nightly, Disp-90 tablet, R-3      aspirin 325 MG tablet Take 1 tablet by mouth daily, Disp-30 tablet, R-5               ALLERGIES     Iv contrast [iodides] and Pcn [penicillins]    FAMILY HISTORY       Family History   Problem Relation Age of Onset    Heart Disease Mother     Heart Disease Father          SOCIAL HISTORY       Social History     Socioeconomic History    Marital status:       Spouse name: None    Number of children: None    Years of education: None    Highest education level: None   Occupational History    None   Social Needs    Financial resource strain: None    Food insecurity:     Worry: None     Inability: None    Transportation needs:     Medical: None     Non-medical: None   Tobacco Use    Smoking status: Current Every Day Smoker     Packs/day: 1.00     Years: 40.00     Pack years: 40.00    Smokeless tobacco: Never Used   Substance and Sexual Activity    Alcohol use: No    Drug use: No    Sexual activity: Not Currently   Lifestyle    Physical activity: Days per week: None     Minutes per session: None    Stress: None   Relationships    Social connections:     Talks on phone: None     Gets together: None     Attends Zoroastrianism service: None     Active member of club or organization: None     Attends meetings of clubs or organizations: None     Relationship status: None    Intimate partner violence:     Fear of current or ex partner: None     Emotionally abused: None     Physically abused: None     Forced sexual activity: None   Other Topics Concern    None   Social History Narrative    None       SCREENINGS             PHYSICAL EXAM    (up to 7 for level 4, 8 ormore for level 5)     ED Triage Vitals [06/25/19 1551]   BP Temp Temp Source Pulse Resp SpO2 Height Weight   105/74 98.4 °F (36.9 °C) Oral 76 18 95 % 5' 2\" (1.575 m) 148 lb (67.1 kg)       Physical Exam   Constitutional: She is oriented to person, place, and time. She appears well-developed and well-nourished. HENT:   Head: Normocephalic and atraumatic. Eyes: Pupils are equal, round, and reactive to light. EOM are normal.   Neck: Normal range of motion. Cardiovascular: Normal rate. Pulmonary/Chest: Effort normal. No respiratory distress. Abdominal: Soft. She exhibits no distension. There is no tenderness. Musculoskeletal: Normal range of motion. Neurological: She is alert and oriented to person, place, and time. No cranial nerve deficit. Skin: Skin is warm and dry. Psychiatric: She has a normal mood and affect.        DIAGNOSTIC RESULTS   LABS:    Labs Reviewed   COMPREHENSIVE METABOLIC PANEL W/ REFLEX TO MG FOR LOW K - Abnormal; Notable for the following components:       Result Value    Sodium 133 (*)     Chloride 92 (*)     Glucose 534 (*)     BUN 21 (*)     AST 10 (*)     All other components within normal limits    Narrative:     Performed at:  [facility],  [place], [facility]   Phone [phone]   URINE RT REFLEX TO CULTURE - Abnormal; Notable for the following components:    Clarity, UA CLOUDY (*)     Glucose, Ur 100 (*)     Blood, Urine MODERATE (*)     Protein, UA 30 (*)     Nitrite, Urine POSITIVE (*)     Leukocyte Esterase, Urine MODERATE (*)     All other components within normal limits    Narrative:     Performed at:  [facility],  [place], [place]   Phone [phone]   LACTIC ACID, PLASMA - Abnormal; Notable for the following components:    Lactic Acid 2.2 (*)     All other components within normal limits    Narrative:     Performed at:  [facility],  [place], [place]   Phone [phone]   BLOOD GAS, VENOUS - Abnormal; Notable for the following components:    pH, Phillip 7.341 (*)     pCO2, Phillip 53.0 (*)     pO2, Phillip 62.1 (*)     Carboxyhemoglobin 2.8 (*)     All other components within normal limits    Narrative:     Performed at:  [facility] [address],  [place], [place]   Phone [phone]   LACTIC ACID, PLASMA - Abnormal; Notable for the following components:    Lactic Acid 2.3 (*)     All other components within normal limits    Narrative:     Performed at:  [facility] [address],  [place], [place]   Phone [phone]   POCT GLUCOSE - Abnormal; Notable for the following components:    POC Glucose 574 (*)     All other components within normal limits    Narrative:     Performed at:  [facility]) [facility],  [place], [place]   Phone [phone]   POCT GLUCOSE - Abnormal; Notable for the following components:    POC Glucose 287 (*)     All other components within normal limits    Narrative:     Performed at:  [facility]) [facility],  [place], [place]   Phone [phone]   POCT GLUCOSE - Abnormal; Notable for the following components: POC Glucose 188 (*)     All other components within normal limits    Narrative:     Performed at:  [facility] [address],  [place], [facility]   Phone [phone]   CULTURE BLOOD #1   CULTURE BLOOD #2   URINE CULTURE   CBC WITH AUTO DIFFERENTIAL    Narrative:     Performed at:  [facility] [address],  [place], [facility]   Phone [phone]   BETA-HYDROXYBUTYRATE    Narrative:     Performed at:  [facility] [address],  [place], [facility]   Phone [phone]   MICROSCOPIC URINALYSIS    Narrative:     Performed at:  [place] [address],  [place], [facility]   Phone [phone]   LACTIC ACID, PLASMA    Narrative:     Performed at:  [facility] [address],  [place], [facility]   Phone [phone]   POCT GLUCOSE    Narrative:     Performed at:  [facility]) [facility],  [place], [facility]   Phone [phone]       All other labs were within normal range or notreturned as of this dictation. EKG: All EKG's are interpreted by the Emergency Department Physician who either signs or Co-signs this chart in the absence of a cardiologist.  Please see their note for interpretation of EKG. RADIOLOGY:     Chest x-ray interpreted by radiologist for no focal airspace consolidation or edema. Interpretation per the Radiologist below, if available at the time of this note:    XR CHEST PORTABLE   Final Result   No focal airspace consolidation or edema. XR CHEST PORTABLE    (Results Pending)     No results found.       PROCEDURES   Unless otherwise noted below, none     Procedures    CRITICAL CARE TIME   N/A    CONSULTS:  None      EMERGENCY DEPARTMENT COURSE and DIFFERENTIAL DIAGNOSIS/MDM:   Vitals:    Vitals:    06/25/19 2105 06/25/19 2119 06/25/19 2142 06/25/19 2240   BP: (!) 147/66 129/67 (!) 137/102 125/74   Pulse:    95   Resp:    20   Temp:       TempSrc:       SpO2: 92% 92% 95% 94%   Weight:       Height:           Patient was given the following medications:  Medications   0.9 % sodium chloride bolus (0 mLs Intravenous Stopped 6/25/19 1912)   insulin regular (HUMULIN R;NOVOLIN R) injection 10 Units (10 Units Intravenous Given 6/25/19 1643)   ciprofloxacin (CIPRO) tablet 500 mg (500 mg Oral Given 6/25/19 1929)   0.9 % sodium chloride bolus (0 mLs Intravenous Stopped 6/25/19 2107)       Patient was seen and evaluated by Dr. Jolene Olivarez and myself. Patient here for concerns for elevated blood sugar. Patient also complains of fatigue. Patient reports that she was just diagnosed with a UTI and has been placed on Bactrim. Patient states that she has been taking her antibiotics and checking her blood sugars however reports that her blood sugars have not been under 500. On exam she is awake and alert hemodynamically stable nontoxic in appearance. Lab values have been reviewed and interpreted. Patient was found to have an elevated blood sugar of 536. She was given IV fluids and IV insulin. Her blood sugar has improved throughout her visit. However she did have a lactic acidosis of 2.3. After 2 L of fluids her lactic was repeated and was found to be 3.3. Patient was given 1/3 L of IV fluids and her lactic was found to be 1.9. Patient's urine is concerning for UTI however she is currently being treated for that. I was able to review her culture and sensitivities and found that Bactrim is a susceptible drug. Patient was encouraged to continue taking her Bactrim. She was encouraged to follow-up with her primary care doctor in the next few days and to return to the ED for worsening symptoms. Patient was encouraged to continue following her blood sugars. She was discharged home with all questions answered. The patient tolerated their visit well.   They were seen and evaluated by the attendingphysician, No att. providers found who agreed with the assessment and plan. The patient and / or the family were informed of the results of any tests, a time was given to answer questions, a plan was proposed and they agreed Tete Hamilton FINAL IMPRESSION      1. Fatigue, unspecified type    2. History of UTI    3.  Elevated blood sugar          DISPOSITION/PLAN   DISPOSITION        PATIENT REFERRED TO:  Jessica Sparrow DO  [address] [place] [address] [phone]    Schedule an appointment as soon as possible for a visit in 2 days  for re-evaluation    [place] [facility]  [facility]  [phone]    If symptoms worsen      DISCHARGE MEDICATIONS:  Discharge Medication List as of 6/25/2019 10:04 PM          DISCONTINUED MEDICATIONS:  Discharge Medication List as of 6/25/2019 10:04 PM      STOP taking these medications       lisinopril (PRINIVIL;ZESTRIL) 40 MG tablet Comments:   Reason for Stopping:                      (Please note that portions of this note were completed with a voice recognition program.  Efforts were made to edit the dictations but occasionally words are mis-transcribed.)    MOHAN Ledesma CNP (electronically signed)       MOHAN Ledesma CNP  06/25/19 4027

## 2019-06-25 NOTE — ED NOTES
Super[facility] to have the patient transported back to [address] [place] [address] a cab voucher if she gets discharged.       Chuy More RN  06/25/19 2681

## 2019-06-25 NOTE — ED NOTES
Bed: 14  Expected date:   Expected time:   Means of arrival:   Comments:  [facility] [address]  06/25/19 5574

## 2019-06-25 NOTE — ED TRIAGE NOTES
Patient became weak today at local business. EMS was called. Patient glucose 573. Patient currently on 2nd day of antibiotics for UTI. Patient explains she's fallen several times over the last few weeks due to weakness.

## 2019-06-25 NOTE — ED PROVIDER NOTES
I independently evaluated and obtained a history and physical on Yuridia Leach.    All diagnostic, treatment, and disposition assistants were made to myself in conjunction the advanced practice provider. For further details of this patient's emergency department encounter, please see the advanced practice provider's documentation. History: Pt with abdominal pain. Having episodes of legs giving out. Says that she has also been hypoglycemic. Physician Exam: No abdominal TTP. No acute distress. MDM: Discussed medical decision making with KYLER and agree with plan. Please see their note for further detail.            Apollo Jay MD  07/03/19 9412

## 2019-06-27 LAB
ORGANISM: ABNORMAL
URINE CULTURE, ROUTINE: ABNORMAL
URINE CULTURE, ROUTINE: ABNORMAL

## 2019-06-30 LAB — CULTURE, BLOOD 2: NORMAL

## 2019-07-01 LAB — BLOOD CULTURE, ROUTINE: NORMAL

## 2019-07-06 ENCOUNTER — APPOINTMENT (OUTPATIENT)
Dept: CT IMAGING | Age: 64
End: 2019-07-06
Payer: MEDICARE

## 2019-07-06 ENCOUNTER — APPOINTMENT (OUTPATIENT)
Dept: GENERAL RADIOLOGY | Age: 64
End: 2019-07-06
Payer: MEDICARE

## 2019-07-06 ENCOUNTER — HOSPITAL ENCOUNTER (EMERGENCY)
Age: 64
Discharge: HOME | End: 2019-07-07
Attending: EMERGENCY MEDICINE
Payer: MEDICARE

## 2019-07-06 VITALS
OXYGEN SATURATION: 93 % | HEIGHT: 63 IN | HEART RATE: 105 BPM | DIASTOLIC BLOOD PRESSURE: 70 MMHG | TEMPERATURE: 98.3 F | SYSTOLIC BLOOD PRESSURE: 139 MMHG | WEIGHT: 158 LBS | RESPIRATION RATE: 16 BRPM | BODY MASS INDEX: 28 KG/M2

## 2019-07-06 DIAGNOSIS — N30.01 ACUTE CYSTITIS WITH HEMATURIA: ICD-10-CM

## 2019-07-06 DIAGNOSIS — R73.9 HYPERGLYCEMIA: Primary | ICD-10-CM

## 2019-07-06 LAB
A/G RATIO: 1.1 (ref 1.1–2.2)
ALBUMIN SERPL-MCNC: 3.8 G/DL (ref 3.4–5)
ALP BLD-CCNC: 114 U/L (ref 40–129)
ALT SERPL-CCNC: 11 U/L (ref 10–40)
ANION GAP SERPL CALCULATED.3IONS-SCNC: 14 MMOL/L (ref 3–16)
AST SERPL-CCNC: 13 U/L (ref 15–37)
BACTERIA: ABNORMAL /HPF
BASE EXCESS VENOUS: 0.4 MMOL/L (ref -3–3)
BASOPHILS ABSOLUTE: 0.1 K/UL (ref 0–0.2)
BASOPHILS RELATIVE PERCENT: 0.9 %
BETA-HYDROXYBUTYRATE: 0.9 MMOL/L (ref 0–0.27)
BILIRUB SERPL-MCNC: <0.2 MG/DL (ref 0–1)
BILIRUBIN URINE: NEGATIVE
BLOOD, URINE: ABNORMAL
BUN BLDV-MCNC: 14 MG/DL (ref 7–20)
CALCIUM SERPL-MCNC: 9.4 MG/DL (ref 8.3–10.6)
CARBOXYHEMOGLOBIN: 2.4 % (ref 0–1.5)
CHLORIDE BLD-SCNC: 95 MMOL/L (ref 99–110)
CHP ED QC CHECK: YES
CLARITY: ABNORMAL
CO2: 24 MMOL/L (ref 21–32)
COLOR: YELLOW
CREAT SERPL-MCNC: 0.9 MG/DL (ref 0.6–1.2)
EOSINOPHILS ABSOLUTE: 0.2 K/UL (ref 0–0.6)
EOSINOPHILS RELATIVE PERCENT: 2.4 %
EPITHELIAL CELLS, UA: ABNORMAL /HPF
GFR AFRICAN AMERICAN: >60
GFR NON-AFRICAN AMERICAN: >60
GLOBULIN: 3.4 G/DL
GLUCOSE BLD-MCNC: 376 MG/DL (ref 70–99)
GLUCOSE BLD-MCNC: 377 MG/DL
GLUCOSE BLD-MCNC: 377 MG/DL (ref 70–99)
GLUCOSE BLD-MCNC: 551 MG/DL
GLUCOSE BLD-MCNC: 551 MG/DL (ref 70–99)
GLUCOSE BLD-MCNC: 558 MG/DL (ref 70–99)
GLUCOSE URINE: >=1000 MG/DL
HCO3 VENOUS: 26.2 MMOL/L (ref 23–29)
HCT VFR BLD CALC: 37.3 % (ref 36–48)
HEMOGLOBIN: 12.2 G/DL (ref 12–16)
KETONES, URINE: ABNORMAL MG/DL
LEUKOCYTE ESTERASE, URINE: ABNORMAL
LYMPHOCYTES ABSOLUTE: 1.8 K/UL (ref 1–5.1)
LYMPHOCYTES RELATIVE PERCENT: 25.8 %
MCH RBC QN AUTO: 28.1 PG (ref 26–34)
MCHC RBC AUTO-ENTMCNC: 32.7 G/DL (ref 31–36)
MCV RBC AUTO: 85.7 FL (ref 80–100)
METHEMOGLOBIN VENOUS: 0.2 %
MICROSCOPIC EXAMINATION: YES
MONOCYTES ABSOLUTE: 0.7 K/UL (ref 0–1.3)
MONOCYTES RELATIVE PERCENT: 9.4 %
NEUTROPHILS ABSOLUTE: 4.3 K/UL (ref 1.7–7.7)
NEUTROPHILS RELATIVE PERCENT: 61.5 %
NITRITE, URINE: NEGATIVE
O2 CONTENT, VEN: 17 VOL %
O2 SAT, VEN: 95 %
O2 THERAPY: ABNORMAL
PCO2, VEN: 46.8 MMHG (ref 40–50)
PDW BLD-RTO: 14.9 % (ref 12.4–15.4)
PERFORMED ON: ABNORMAL
PH UA: 6.5 (ref 5–8)
PH VENOUS: 7.37 (ref 7.35–7.45)
PLATELET # BLD: 245 K/UL (ref 135–450)
PMV BLD AUTO: 8.3 FL (ref 5–10.5)
PO2, VEN: 77 MMHG (ref 25–40)
POTASSIUM REFLEX MAGNESIUM: 4.6 MMOL/L (ref 3.5–5.1)
PROTEIN UA: ABNORMAL MG/DL
RBC # BLD: 4.35 M/UL (ref 4–5.2)
RBC UA: ABNORMAL /HPF (ref 0–2)
SODIUM BLD-SCNC: 133 MMOL/L (ref 136–145)
SPECIFIC GRAVITY UA: <=1.005 (ref 1–1.03)
TCO2 CALC VENOUS: 28 MMOL/L
TOTAL PROTEIN: 7.2 G/DL (ref 6.4–8.2)
TROPONIN: <0.01 NG/ML
URINE REFLEX TO CULTURE: YES
URINE TYPE: ABNORMAL
UROBILINOGEN, URINE: 0.2 E.U./DL
WBC # BLD: 7 K/UL (ref 4–11)
WBC UA: ABNORMAL /HPF (ref 0–5)

## 2019-07-06 PROCEDURE — 82010 KETONE BODYS QUAN: CPT

## 2019-07-06 PROCEDURE — 82803 BLOOD GASES ANY COMBINATION: CPT

## 2019-07-06 PROCEDURE — 85025 COMPLETE CBC W/AUTO DIFF WBC: CPT

## 2019-07-06 PROCEDURE — 70450 CT HEAD/BRAIN W/O DYE: CPT

## 2019-07-06 PROCEDURE — 96365 THER/PROPH/DIAG IV INF INIT: CPT

## 2019-07-06 PROCEDURE — 6370000000 HC RX 637 (ALT 250 FOR IP): Performed by: PHYSICIAN ASSISTANT

## 2019-07-06 PROCEDURE — 87086 URINE CULTURE/COLONY COUNT: CPT

## 2019-07-06 PROCEDURE — 80053 COMPREHEN METABOLIC PANEL: CPT

## 2019-07-06 PROCEDURE — 84484 ASSAY OF TROPONIN QUANT: CPT

## 2019-07-06 PROCEDURE — 2580000003 HC RX 258: Performed by: PHYSICIAN ASSISTANT

## 2019-07-06 PROCEDURE — 93005 ELECTROCARDIOGRAM TRACING: CPT | Performed by: PHYSICIAN ASSISTANT

## 2019-07-06 PROCEDURE — 6360000002 HC RX W HCPCS: Performed by: PHYSICIAN ASSISTANT

## 2019-07-06 PROCEDURE — 87186 SC STD MICRODIL/AGAR DIL: CPT

## 2019-07-06 PROCEDURE — 81001 URINALYSIS AUTO W/SCOPE: CPT

## 2019-07-06 PROCEDURE — 99285 EMERGENCY DEPT VISIT HI MDM: CPT

## 2019-07-06 PROCEDURE — 96361 HYDRATE IV INFUSION ADD-ON: CPT

## 2019-07-06 PROCEDURE — 71046 X-RAY EXAM CHEST 2 VIEWS: CPT

## 2019-07-06 PROCEDURE — 87077 CULTURE AEROBIC IDENTIFY: CPT

## 2019-07-06 PROCEDURE — 96375 TX/PRO/DX INJ NEW DRUG ADDON: CPT

## 2019-07-06 RX ORDER — 0.9 % SODIUM CHLORIDE 0.9 %
1000 INTRAVENOUS SOLUTION INTRAVENOUS ONCE
Status: COMPLETED | OUTPATIENT
Start: 2019-07-06 | End: 2019-07-06

## 2019-07-06 RX ORDER — LEVOFLOXACIN 5 MG/ML
750 INJECTION, SOLUTION INTRAVENOUS ONCE
Status: COMPLETED | OUTPATIENT
Start: 2019-07-06 | End: 2019-07-06

## 2019-07-06 RX ORDER — KETOROLAC TROMETHAMINE 30 MG/ML
30 INJECTION, SOLUTION INTRAMUSCULAR; INTRAVENOUS ONCE
Status: COMPLETED | OUTPATIENT
Start: 2019-07-06 | End: 2019-07-06

## 2019-07-06 RX ORDER — LEVOFLOXACIN 250 MG/1
250 TABLET ORAL DAILY
Qty: 3 TABLET | Refills: 0 | Status: SHIPPED | OUTPATIENT
Start: 2019-07-06 | End: 2019-07-09

## 2019-07-06 RX ORDER — ACETAMINOPHEN 500 MG
500 TABLET ORAL ONCE
Status: COMPLETED | OUTPATIENT
Start: 2019-07-06 | End: 2019-07-06

## 2019-07-06 RX ADMIN — ACETAMINOPHEN 500 MG: 500 TABLET ORAL at 23:48

## 2019-07-06 RX ADMIN — LEVOFLOXACIN 750 MG: 5 INJECTION, SOLUTION INTRAVENOUS at 21:58

## 2019-07-06 RX ADMIN — INSULIN HUMAN 5 UNITS: 100 INJECTION, SOLUTION PARENTERAL at 21:15

## 2019-07-06 RX ADMIN — SODIUM CHLORIDE 1000 ML: 9 INJECTION, SOLUTION INTRAVENOUS at 21:16

## 2019-07-06 RX ADMIN — KETOROLAC TROMETHAMINE 30 MG: 30 INJECTION, SOLUTION INTRAMUSCULAR at 21:58

## 2019-07-06 RX ADMIN — SODIUM CHLORIDE 1000 ML: 9 INJECTION, SOLUTION INTRAVENOUS at 21:15

## 2019-07-06 ASSESSMENT — PAIN DESCRIPTION - ORIENTATION
ORIENTATION: LEFT
ORIENTATION: LEFT

## 2019-07-06 ASSESSMENT — PAIN SCALES - GENERAL
PAINLEVEL_OUTOF10: 7

## 2019-07-06 ASSESSMENT — ENCOUNTER SYMPTOMS
GASTROINTESTINAL NEGATIVE: 1
RESPIRATORY NEGATIVE: 1

## 2019-07-06 ASSESSMENT — PAIN DESCRIPTION - PAIN TYPE: TYPE: ACUTE PAIN

## 2019-07-06 ASSESSMENT — PAIN DESCRIPTION - LOCATION
LOCATION: RIB CAGE
LOCATION: BACK;FLANK;HIP;LEG

## 2019-07-07 LAB
EKG ATRIAL RATE: 110 BPM
EKG DIAGNOSIS: NORMAL
EKG P AXIS: 50 DEGREES
EKG P-R INTERVAL: 174 MS
EKG Q-T INTERVAL: 370 MS
EKG QRS DURATION: 76 MS
EKG QTC CALCULATION (BAZETT): 500 MS
EKG R AXIS: 31 DEGREES
EKG T AXIS: 45 DEGREES
EKG VENTRICULAR RATE: 110 BPM

## 2019-07-07 PROCEDURE — 93010 ELECTROCARDIOGRAM REPORT: CPT | Performed by: INTERNAL MEDICINE

## 2019-07-07 NOTE — ED PROVIDER NOTES
enlargementCannot rule out Anterior infarct , age undeterminedAbnormal ECGNo previous ECGs available          Tamir Rosas MD  07/06/19 1456
Weight   139/70 99.1 °F (37.3 °C) -- 112 16 91 % 5' 3\" (1.6 m) 158 lb (71.7 kg)       Physical Exam   Constitutional: She is oriented to person, place, and time. Vital signs are normal. She appears well-developed and well-nourished. Non-toxic appearance. She does not have a sickly appearance. She does not appear ill. No distress. She is not intubated. Nasal cannula in place. 2 L    HENT:   Head: Normocephalic and atraumatic. Nose: Nose normal.   Eyes: Pupils are equal, round, and reactive to light. Conjunctivae and EOM are normal. Right eye exhibits no discharge. Left eye exhibits no discharge. Right eye exhibits normal extraocular motion and no nystagmus. Left eye exhibits normal extraocular motion and no nystagmus. Right pupil is round and reactive. Left pupil is round and reactive. Pupils are equal.   Neck: Trachea normal, normal range of motion and full passive range of motion without pain. Neck supple. No Kernig's sign noted. Cardiovascular: Normal rate, regular rhythm and normal heart sounds. Exam reveals no gallop. No murmur heard. Pulses:       Radial pulses are 2+ on the right side, and 2+ on the left side. Pulmonary/Chest: Effort normal and breath sounds normal. No accessory muscle usage or stridor. She is not intubated. No respiratory distress. She has no decreased breath sounds. She has no wheezes. She has no rhonchi. She has no rales. She exhibits no tenderness. Musculoskeletal: Normal range of motion. She exhibits no deformity. Reproducible left sided rib pain. No ecchymosis noted to left side of ribs. No deformities. Neurological: She is alert and oriented to person, place, and time. GCS eye subscore is 4. GCS verbal subscore is 5. GCS motor subscore is 6. Skin: Skin is warm, dry and intact. No rash noted. She is not diaphoretic. Psychiatric: She has a normal mood and affect. Her behavior is normal.   Nursing note and vitals reviewed.       DIAGNOSTIC RESULTS   LABS:    Labs

## 2019-07-09 LAB
ORGANISM: ABNORMAL
URINE CULTURE, ROUTINE: ABNORMAL
URINE CULTURE, ROUTINE: ABNORMAL

## 2019-07-09 NOTE — RESULT ENCOUNTER NOTE
patient's urinary tract infection is not sensitive to the antibiotic they were provided.   They need Macrobid 100 mg twice a day for 10 days

## 2019-07-22 ENCOUNTER — HOSPITAL ENCOUNTER (EMERGENCY)
Age: 64
Discharge: HOME | DRG: 639 | End: 2019-07-23
Attending: EMERGENCY MEDICINE
Payer: MEDICARE

## 2019-07-22 DIAGNOSIS — G89.4 CHRONIC PAIN SYNDROME: Primary | ICD-10-CM

## 2019-07-22 PROCEDURE — 99282 EMERGENCY DEPT VISIT SF MDM: CPT

## 2019-07-22 ASSESSMENT — PAIN SCALES - GENERAL: PAINLEVEL_OUTOF10: 8

## 2019-07-23 VITALS
TEMPERATURE: 98.1 F | RESPIRATION RATE: 14 BRPM | HEART RATE: 95 BPM | BODY MASS INDEX: 29.08 KG/M2 | OXYGEN SATURATION: 98 % | HEIGHT: 62 IN | WEIGHT: 158 LBS | DIASTOLIC BLOOD PRESSURE: 78 MMHG | SYSTOLIC BLOOD PRESSURE: 149 MMHG

## 2019-07-23 PROCEDURE — 6370000000 HC RX 637 (ALT 250 FOR IP): Performed by: EMERGENCY MEDICINE

## 2019-07-23 RX ORDER — OXYCODONE HYDROCHLORIDE AND ACETAMINOPHEN 5; 325 MG/1; MG/1
2 TABLET ORAL ONCE
Status: COMPLETED | OUTPATIENT
Start: 2019-07-23 | End: 2019-07-23

## 2019-07-23 RX ORDER — ONDANSETRON 4 MG/1
4 TABLET, ORALLY DISINTEGRATING ORAL ONCE
Status: COMPLETED | OUTPATIENT
Start: 2019-07-23 | End: 2019-07-23

## 2019-07-23 RX ADMIN — ONDANSETRON 4 MG: 4 TABLET, ORALLY DISINTEGRATING ORAL at 01:58

## 2019-07-23 RX ADMIN — OXYCODONE HYDROCHLORIDE AND ACETAMINOPHEN 2 TABLET: 5; 325 TABLET ORAL at 01:57

## 2019-07-23 ASSESSMENT — PAIN SCALES - GENERAL: PAINLEVEL_OUTOF10: 9

## 2019-07-23 NOTE — ED PROVIDER NOTES
Triage Chief Complaint:   Panic Attack (pt c/o pain \"all over from her head down\" pt states, \" pain causing her to have a panic attack since she has been out of her percocets) and Pain      [place]:  Apollo Richardson is a 60 y.o. female that presents with a panic attack. Patient is in the homeless shelter and is run out of her pain medicine and is here for a prescription of pain medicine. Patient treated for chronic pain in [place] and apparently came to [place] and then found herself homeless. She has run out of her medications. The  personnel have gotten her all of her diabetic hypertensive and other medications but are unable to fill pain medications. Patient is concerned that she will withdraw. She does have an appointment with a family physician August 3 but this may have been canceled. She did not have a fever but does describe some diarrhea and nausea. Patient has had chronic pain for quite some time  ROS:  Review of systems was reviewed for 10 systems and is otherwise negative except as in the [address]    Past Medical History:   Diagnosis Date    Bipolar affective disorder ([facility] [address] 9/28/2015    Depression     Diabetes ([facility] [facility]     Hyperlipidemia     Hypertension     Insomnia     Neuropathy      Past Surgical History:   Procedure Laterality Date    CHOLECYSTECTOMY       Family History   Problem Relation Age of Onset    Heart Disease Mother     Heart Disease Father      Social History     Socioeconomic History    Marital status:       Spouse name: Not on file    Number of children: Not on file    Years of education: Not on file    Highest education level: Not on file   Occupational History    Not on file   Social Needs    Financial resource strain: Not on file    Food insecurity:     Worry: Not on file     Inability: Not on file    Transportation needs:     Medical: Not on file     Non-medical: Not on file   Tobacco Use    Smoking status: Never Smoker    Smokeless tobacco:

## 2019-07-23 NOTE — ED NOTES
Report given to [facility] .       [facility]  07/23/19 6508

## 2019-07-24 ENCOUNTER — HOSPITAL ENCOUNTER (INPATIENT)
Age: 64
LOS: 2 days | Discharge: NURSING, REHAB OR LONG-TERM CARE | DRG: 639 | End: 2019-07-26
Attending: EMERGENCY MEDICINE | Admitting: INTERNAL MEDICINE
Payer: MEDICARE

## 2019-07-24 ENCOUNTER — APPOINTMENT (OUTPATIENT)
Dept: GENERAL RADIOLOGY | Age: 64
DRG: 639 | End: 2019-07-24
Payer: MEDICARE

## 2019-07-24 DIAGNOSIS — G62.9 NEUROPATHY: ICD-10-CM

## 2019-07-24 DIAGNOSIS — E11.10 DIABETIC KETOACIDOSIS WITHOUT COMA ASSOCIATED WITH TYPE 2 DIABETES MELLITUS (HCC): Primary | ICD-10-CM

## 2019-07-24 LAB
A/G RATIO: 0.9 (ref 1.1–2.2)
ALBUMIN SERPL-MCNC: 3.4 G/DL (ref 3.4–5)
ALP BLD-CCNC: 154 U/L (ref 40–129)
ALT SERPL-CCNC: 14 U/L (ref 10–40)
AMPHETAMINE SCREEN, URINE: NORMAL
ANION GAP SERPL CALCULATED.3IONS-SCNC: 11 MMOL/L (ref 3–16)
ANION GAP SERPL CALCULATED.3IONS-SCNC: 12 MMOL/L (ref 3–16)
ANION GAP SERPL CALCULATED.3IONS-SCNC: 15 MMOL/L (ref 3–16)
ANION GAP SERPL CALCULATED.3IONS-SCNC: 17 MMOL/L (ref 3–16)
AST SERPL-CCNC: 11 U/L (ref 15–37)
BARBITURATE SCREEN URINE: NORMAL
BASE EXCESS VENOUS: 2.1 MMOL/L (ref -3–3)
BASOPHILS ABSOLUTE: 0 K/UL (ref 0–0.2)
BASOPHILS RELATIVE PERCENT: 0.7 %
BENZODIAZEPINE SCREEN, URINE: NORMAL
BETA-HYDROXYBUTYRATE: 1.3 MMOL/L (ref 0–0.27)
BILIRUB SERPL-MCNC: 0.4 MG/DL (ref 0–1)
BILIRUBIN URINE: NEGATIVE
BLOOD, URINE: NEGATIVE
BUN BLDV-MCNC: 13 MG/DL (ref 7–20)
BUN BLDV-MCNC: 15 MG/DL (ref 7–20)
BUN BLDV-MCNC: 16 MG/DL (ref 7–20)
BUN BLDV-MCNC: 19 MG/DL (ref 7–20)
BUN BLDV-MCNC: 21 MG/DL (ref 7–20)
BUN BLDV-MCNC: 22 MG/DL (ref 7–20)
CALCIUM SERPL-MCNC: 8.3 MG/DL (ref 8.3–10.6)
CALCIUM SERPL-MCNC: 8.5 MG/DL (ref 8.3–10.6)
CALCIUM SERPL-MCNC: 8.5 MG/DL (ref 8.3–10.6)
CALCIUM SERPL-MCNC: 8.7 MG/DL (ref 8.3–10.6)
CALCIUM SERPL-MCNC: 8.7 MG/DL (ref 8.3–10.6)
CALCIUM SERPL-MCNC: 8.8 MG/DL (ref 8.3–10.6)
CANNABINOID SCREEN URINE: NORMAL
CARBOXYHEMOGLOBIN: 2.2 % (ref 0–1.5)
CHLORIDE BLD-SCNC: 83 MMOL/L (ref 99–110)
CHLORIDE BLD-SCNC: 95 MMOL/L (ref 99–110)
CHLORIDE BLD-SCNC: 96 MMOL/L (ref 99–110)
CHLORIDE BLD-SCNC: 97 MMOL/L (ref 99–110)
CHLORIDE BLD-SCNC: 98 MMOL/L (ref 99–110)
CHLORIDE BLD-SCNC: 99 MMOL/L (ref 99–110)
CHOLESTEROL, TOTAL: 124 MG/DL (ref 0–199)
CLARITY: CLEAR
CO2: 22 MMOL/L (ref 21–32)
CO2: 23 MMOL/L (ref 21–32)
CO2: 23 MMOL/L (ref 21–32)
CO2: 25 MMOL/L (ref 21–32)
CO2: 27 MMOL/L (ref 21–32)
CO2: 28 MMOL/L (ref 21–32)
COCAINE METABOLITE SCREEN URINE: NORMAL
COLOR: YELLOW
CREAT SERPL-MCNC: 0.6 MG/DL (ref 0.6–1.2)
CREAT SERPL-MCNC: 0.6 MG/DL (ref 0.6–1.2)
CREAT SERPL-MCNC: 0.7 MG/DL (ref 0.6–1.2)
CREAT SERPL-MCNC: 0.7 MG/DL (ref 0.6–1.2)
CREAT SERPL-MCNC: 0.9 MG/DL (ref 0.6–1.2)
CREAT SERPL-MCNC: 0.9 MG/DL (ref 0.6–1.2)
EKG ATRIAL RATE: 81 BPM
EKG ATRIAL RATE: 88 BPM
EKG ATRIAL RATE: 88 BPM
EKG ATRIAL RATE: 89 BPM
EKG DIAGNOSIS: NORMAL
EKG P AXIS: 49 DEGREES
EKG P AXIS: 52 DEGREES
EKG P AXIS: 56 DEGREES
EKG P AXIS: 57 DEGREES
EKG P-R INTERVAL: 168 MS
EKG P-R INTERVAL: 170 MS
EKG P-R INTERVAL: 172 MS
EKG P-R INTERVAL: 198 MS
EKG Q-T INTERVAL: 388 MS
EKG Q-T INTERVAL: 392 MS
EKG Q-T INTERVAL: 428 MS
EKG Q-T INTERVAL: 564 MS
EKG QRS DURATION: 84 MS
EKG QRS DURATION: 86 MS
EKG QRS DURATION: 88 MS
EKG QRS DURATION: 94 MS
EKG QTC CALCULATION (BAZETT): 469 MS
EKG QTC CALCULATION (BAZETT): 476 MS
EKG QTC CALCULATION (BAZETT): 497 MS
EKG QTC CALCULATION (BAZETT): 682 MS
EKG R AXIS: 46 DEGREES
EKG R AXIS: 54 DEGREES
EKG R AXIS: 57 DEGREES
EKG R AXIS: 62 DEGREES
EKG T AXIS: -6 DEGREES
EKG T AXIS: 59 DEGREES
EKG T AXIS: 61 DEGREES
EKG T AXIS: 63 DEGREES
EKG VENTRICULAR RATE: 81 BPM
EKG VENTRICULAR RATE: 88 BPM
EKG VENTRICULAR RATE: 88 BPM
EKG VENTRICULAR RATE: 89 BPM
EOSINOPHILS ABSOLUTE: 0.1 K/UL (ref 0–0.6)
EOSINOPHILS RELATIVE PERCENT: 0.8 %
ETHANOL: NORMAL MG/DL (ref 0–0.08)
GFR AFRICAN AMERICAN: >60
GFR NON-AFRICAN AMERICAN: >60
GLOBULIN: 3.6 G/DL
GLUCOSE BLD-MCNC: 202 MG/DL (ref 70–99)
GLUCOSE BLD-MCNC: 205 MG/DL (ref 70–99)
GLUCOSE BLD-MCNC: 213 MG/DL (ref 70–99)
GLUCOSE BLD-MCNC: 232 MG/DL (ref 70–99)
GLUCOSE BLD-MCNC: 382 MG/DL (ref 70–99)
GLUCOSE BLD-MCNC: 397 MG/DL (ref 70–99)
GLUCOSE BLD-MCNC: 399 MG/DL (ref 70–99)
GLUCOSE BLD-MCNC: 422 MG/DL (ref 70–99)
GLUCOSE BLD-MCNC: 432 MG/DL (ref 70–99)
GLUCOSE BLD-MCNC: 440 MG/DL (ref 70–99)
GLUCOSE BLD-MCNC: 472 MG/DL (ref 70–99)
GLUCOSE BLD-MCNC: 531 MG/DL
GLUCOSE BLD-MCNC: 531 MG/DL (ref 70–99)
GLUCOSE BLD-MCNC: 861 MG/DL (ref 70–99)
GLUCOSE BLD-MCNC: >600 MG/DL (ref 70–99)
GLUCOSE URINE: >=1000 MG/DL
HCO3 VENOUS: 28.5 MMOL/L (ref 23–29)
HCT VFR BLD CALC: 37.5 % (ref 36–48)
HDLC SERPL-MCNC: 24 MG/DL (ref 40–60)
HEMOGLOBIN: 12.1 G/DL (ref 12–16)
KETONES, URINE: 15 MG/DL
LACTIC ACID: 1.3 MMOL/L (ref 0.4–2)
LDL CHOLESTEROL CALCULATED: ABNORMAL MG/DL
LDL CHOLESTEROL DIRECT: 64 MG/DL
LEUKOCYTE ESTERASE, URINE: NEGATIVE
LYMPHOCYTES ABSOLUTE: 2 K/UL (ref 1–5.1)
LYMPHOCYTES RELATIVE PERCENT: 29.9 %
Lab: NORMAL
MAGNESIUM: 2.3 MG/DL (ref 1.8–2.4)
MAGNESIUM: 2.3 MG/DL (ref 1.8–2.4)
MAGNESIUM: 2.4 MG/DL (ref 1.8–2.4)
MAGNESIUM: 2.4 MG/DL (ref 1.8–2.4)
MAGNESIUM: 2.5 MG/DL (ref 1.8–2.4)
MCH RBC QN AUTO: 28 PG (ref 26–34)
MCHC RBC AUTO-ENTMCNC: 32.3 G/DL (ref 31–36)
MCV RBC AUTO: 86.4 FL (ref 80–100)
METHADONE SCREEN, URINE: NORMAL
METHEMOGLOBIN VENOUS: 0.2 %
MICROSCOPIC EXAMINATION: ABNORMAL
MONOCYTES ABSOLUTE: 0.7 K/UL (ref 0–1.3)
MONOCYTES RELATIVE PERCENT: 10.5 %
NEUTROPHILS ABSOLUTE: 3.9 K/UL (ref 1.7–7.7)
NEUTROPHILS RELATIVE PERCENT: 58.1 %
NITRITE, URINE: NEGATIVE
O2 CONTENT, VEN: 12 VOL %
O2 SAT, VEN: 74 %
O2 THERAPY: ABNORMAL
OPIATE SCREEN URINE: NORMAL
OXYCODONE URINE: NORMAL
PCO2, VEN: 52.5 MMHG (ref 40–50)
PDW BLD-RTO: 14.8 % (ref 12.4–15.4)
PERFORMED ON: ABNORMAL
PH UA: 6
PH UA: 6 (ref 5–8)
PH VENOUS: 7.35 (ref 7.35–7.45)
PHENCYCLIDINE SCREEN URINE: NORMAL
PHOSPHORUS: 2.5 MG/DL (ref 2.5–4.9)
PHOSPHORUS: 2.9 MG/DL (ref 2.5–4.9)
PHOSPHORUS: 3.1 MG/DL (ref 2.5–4.9)
PHOSPHORUS: 3.8 MG/DL (ref 2.5–4.9)
PLATELET # BLD: 223 K/UL (ref 135–450)
PMV BLD AUTO: 8.4 FL (ref 5–10.5)
PO2, VEN: 41.5 MMHG (ref 25–40)
POTASSIUM SERPL-SCNC: 4 MMOL/L (ref 3.5–5.1)
POTASSIUM SERPL-SCNC: 4.1 MMOL/L (ref 3.5–5.1)
POTASSIUM SERPL-SCNC: 4.1 MMOL/L (ref 3.5–5.1)
POTASSIUM SERPL-SCNC: 4.3 MMOL/L (ref 3.5–5.1)
POTASSIUM SERPL-SCNC: 4.4 MMOL/L (ref 3.5–5.1)
POTASSIUM SERPL-SCNC: 4.5 MMOL/L (ref 3.5–5.1)
PROCALCITONIN: 0.05 NG/ML (ref 0–0.15)
PROPOXYPHENE SCREEN: NORMAL
PROTEIN UA: NEGATIVE MG/DL
RBC # BLD: 4.34 M/UL (ref 4–5.2)
SODIUM BLD-SCNC: 122 MMOL/L (ref 136–145)
SODIUM BLD-SCNC: 133 MMOL/L (ref 136–145)
SODIUM BLD-SCNC: 133 MMOL/L (ref 136–145)
SODIUM BLD-SCNC: 134 MMOL/L (ref 136–145)
SODIUM BLD-SCNC: 136 MMOL/L (ref 136–145)
SODIUM BLD-SCNC: 137 MMOL/L (ref 136–145)
SPECIFIC GRAVITY UA: <=1.005 (ref 1–1.03)
TCO2 CALC VENOUS: 30 MMOL/L
TOTAL PROTEIN: 7 G/DL (ref 6.4–8.2)
TRIGL SERPL-MCNC: 333 MG/DL (ref 0–150)
TROPONIN: <0.01 NG/ML
URINE REFLEX TO CULTURE: ABNORMAL
URINE TYPE: ABNORMAL
UROBILINOGEN, URINE: 0.2 E.U./DL
VLDLC SERPL CALC-MCNC: ABNORMAL MG/DL
WBC # BLD: 6.7 K/UL (ref 4–11)

## 2019-07-24 PROCEDURE — 80307 DRUG TEST PRSMV CHEM ANLYZR: CPT

## 2019-07-24 PROCEDURE — G0480 DRUG TEST DEF 1-7 CLASSES: HCPCS

## 2019-07-24 PROCEDURE — 84100 ASSAY OF PHOSPHORUS: CPT

## 2019-07-24 PROCEDURE — 6360000002 HC RX W HCPCS

## 2019-07-24 PROCEDURE — 80061 LIPID PANEL: CPT

## 2019-07-24 PROCEDURE — 6360000002 HC RX W HCPCS: Performed by: INTERNAL MEDICINE

## 2019-07-24 PROCEDURE — 36415 COLL VENOUS BLD VENIPUNCTURE: CPT

## 2019-07-24 PROCEDURE — 82010 KETONE BODYS QUAN: CPT

## 2019-07-24 PROCEDURE — 80053 COMPREHEN METABOLIC PANEL: CPT

## 2019-07-24 PROCEDURE — 83605 ASSAY OF LACTIC ACID: CPT

## 2019-07-24 PROCEDURE — 71046 X-RAY EXAM CHEST 2 VIEWS: CPT

## 2019-07-24 PROCEDURE — 81003 URINALYSIS AUTO W/O SCOPE: CPT

## 2019-07-24 PROCEDURE — 6370000000 HC RX 637 (ALT 250 FOR IP): Performed by: INTERNAL MEDICINE

## 2019-07-24 PROCEDURE — 71045 X-RAY EXAM CHEST 1 VIEW: CPT

## 2019-07-24 PROCEDURE — 99285 EMERGENCY DEPT VISIT HI MDM: CPT

## 2019-07-24 PROCEDURE — 2580000003 HC RX 258: Performed by: INTERNAL MEDICINE

## 2019-07-24 PROCEDURE — 51702 INSERT TEMP BLADDER CATH: CPT

## 2019-07-24 PROCEDURE — 93010 ELECTROCARDIOGRAM REPORT: CPT | Performed by: INTERNAL MEDICINE

## 2019-07-24 PROCEDURE — 99222 1ST HOSP IP/OBS MODERATE 55: CPT | Performed by: INTERNAL MEDICINE

## 2019-07-24 PROCEDURE — 6370000000 HC RX 637 (ALT 250 FOR IP): Performed by: EMERGENCY MEDICINE

## 2019-07-24 PROCEDURE — 93005 ELECTROCARDIOGRAM TRACING: CPT | Performed by: EMERGENCY MEDICINE

## 2019-07-24 PROCEDURE — 85025 COMPLETE CBC W/AUTO DIFF WBC: CPT

## 2019-07-24 PROCEDURE — 83735 ASSAY OF MAGNESIUM: CPT

## 2019-07-24 PROCEDURE — 84145 PROCALCITONIN (PCT): CPT

## 2019-07-24 PROCEDURE — 97535 SELF CARE MNGMENT TRAINING: CPT

## 2019-07-24 PROCEDURE — 97162 PT EVAL MOD COMPLEX 30 MIN: CPT

## 2019-07-24 PROCEDURE — 1200000000 HC SEMI PRIVATE

## 2019-07-24 PROCEDURE — 93005 ELECTROCARDIOGRAM TRACING: CPT | Performed by: INTERNAL MEDICINE

## 2019-07-24 PROCEDURE — 94640 AIRWAY INHALATION TREATMENT: CPT

## 2019-07-24 PROCEDURE — 97116 GAIT TRAINING THERAPY: CPT

## 2019-07-24 PROCEDURE — 2580000003 HC RX 258: Performed by: EMERGENCY MEDICINE

## 2019-07-24 PROCEDURE — 82803 BLOOD GASES ANY COMBINATION: CPT

## 2019-07-24 PROCEDURE — 96361 HYDRATE IV INFUSION ADD-ON: CPT

## 2019-07-24 PROCEDURE — 96374 THER/PROPH/DIAG INJ IV PUSH: CPT

## 2019-07-24 PROCEDURE — 97166 OT EVAL MOD COMPLEX 45 MIN: CPT

## 2019-07-24 PROCEDURE — 84484 ASSAY OF TROPONIN QUANT: CPT

## 2019-07-24 RX ORDER — BUSPIRONE HYDROCHLORIDE 5 MG/1
10 TABLET ORAL 3 TIMES DAILY
Status: DISCONTINUED | OUTPATIENT
Start: 2019-07-24 | End: 2019-07-26 | Stop reason: HOSPADM

## 2019-07-24 RX ORDER — PANTOPRAZOLE SODIUM 40 MG/10ML
40 INJECTION, POWDER, LYOPHILIZED, FOR SOLUTION INTRAVENOUS DAILY
Status: DISCONTINUED | OUTPATIENT
Start: 2019-07-24 | End: 2019-07-24

## 2019-07-24 RX ORDER — QUETIAPINE FUMARATE 200 MG/1
400 TABLET, FILM COATED ORAL NIGHTLY
Status: DISCONTINUED | OUTPATIENT
Start: 2019-07-24 | End: 2019-07-26 | Stop reason: HOSPADM

## 2019-07-24 RX ORDER — FLUOXETINE HYDROCHLORIDE 20 MG/1
20 CAPSULE ORAL DAILY
Status: DISCONTINUED | OUTPATIENT
Start: 2019-07-24 | End: 2019-07-26 | Stop reason: HOSPADM

## 2019-07-24 RX ORDER — SODIUM CHLORIDE 9 MG/ML
INJECTION, SOLUTION INTRAVENOUS CONTINUOUS
Status: DISCONTINUED | OUTPATIENT
Start: 2019-07-24 | End: 2019-07-25

## 2019-07-24 RX ORDER — INSULIN GLARGINE 100 [IU]/ML
30 INJECTION, SOLUTION SUBCUTANEOUS ONCE
Status: COMPLETED | OUTPATIENT
Start: 2019-07-24 | End: 2019-07-24

## 2019-07-24 RX ORDER — IPRATROPIUM BROMIDE AND ALBUTEROL SULFATE 2.5; .5 MG/3ML; MG/3ML
1 SOLUTION RESPIRATORY (INHALATION)
Status: DISCONTINUED | OUTPATIENT
Start: 2019-07-24 | End: 2019-07-24

## 2019-07-24 RX ORDER — POTASSIUM CHLORIDE 7.45 MG/ML
10 INJECTION INTRAVENOUS PRN
Status: DISCONTINUED | OUTPATIENT
Start: 2019-07-24 | End: 2019-07-26 | Stop reason: HOSPADM

## 2019-07-24 RX ORDER — DEXTROSE MONOHYDRATE 25 G/50ML
12.5 INJECTION, SOLUTION INTRAVENOUS PRN
Status: DISCONTINUED | OUTPATIENT
Start: 2019-07-24 | End: 2019-07-26 | Stop reason: HOSPADM

## 2019-07-24 RX ORDER — ASPIRIN 325 MG
325 TABLET ORAL DAILY
Status: DISCONTINUED | OUTPATIENT
Start: 2019-07-24 | End: 2019-07-26 | Stop reason: HOSPADM

## 2019-07-24 RX ORDER — NALOXONE HYDROCHLORIDE 0.4 MG/ML
INJECTION, SOLUTION INTRAMUSCULAR; INTRAVENOUS; SUBCUTANEOUS
Status: COMPLETED
Start: 2019-07-24 | End: 2019-07-24

## 2019-07-24 RX ORDER — NALOXONE HYDROCHLORIDE 0.4 MG/ML
0.4 INJECTION, SOLUTION INTRAMUSCULAR; INTRAVENOUS; SUBCUTANEOUS ONCE
Status: DISCONTINUED | OUTPATIENT
Start: 2019-07-24 | End: 2019-07-24

## 2019-07-24 RX ORDER — SODIUM CHLORIDE, SODIUM LACTATE, POTASSIUM CHLORIDE, CALCIUM CHLORIDE 600; 310; 30; 20 MG/100ML; MG/100ML; MG/100ML; MG/100ML
INJECTION, SOLUTION INTRAVENOUS CONTINUOUS
Status: DISCONTINUED | OUTPATIENT
Start: 2019-07-24 | End: 2019-07-24

## 2019-07-24 RX ORDER — SODIUM CHLORIDE 450 MG/100ML
INJECTION, SOLUTION INTRAVENOUS CONTINUOUS
Status: DISCONTINUED | OUTPATIENT
Start: 2019-07-24 | End: 2019-07-24

## 2019-07-24 RX ORDER — CYCLOBENZAPRINE HCL 10 MG
10 TABLET ORAL 3 TIMES DAILY
Status: DISCONTINUED | OUTPATIENT
Start: 2019-07-24 | End: 2019-07-25

## 2019-07-24 RX ORDER — INSULIN GLARGINE 100 [IU]/ML
60 INJECTION, SOLUTION SUBCUTANEOUS EVERY MORNING
Status: DISCONTINUED | OUTPATIENT
Start: 2019-07-24 | End: 2019-07-24

## 2019-07-24 RX ORDER — ESOMEPRAZOLE SODIUM 40 MG/5ML
40 INJECTION INTRAVENOUS
Status: DISCONTINUED | OUTPATIENT
Start: 2019-07-24 | End: 2019-07-26 | Stop reason: HOSPADM

## 2019-07-24 RX ORDER — DEXTROSE AND SODIUM CHLORIDE 5; .45 G/100ML; G/100ML
INJECTION, SOLUTION INTRAVENOUS CONTINUOUS PRN
Status: DISCONTINUED | OUTPATIENT
Start: 2019-07-24 | End: 2019-07-25

## 2019-07-24 RX ORDER — CARBAMAZEPINE 200 MG/1
200 TABLET ORAL 3 TIMES DAILY
Status: DISCONTINUED | OUTPATIENT
Start: 2019-07-24 | End: 2019-07-26 | Stop reason: HOSPADM

## 2019-07-24 RX ORDER — GABAPENTIN 400 MG/1
800 CAPSULE ORAL 3 TIMES DAILY
Status: DISCONTINUED | OUTPATIENT
Start: 2019-07-24 | End: 2019-07-25

## 2019-07-24 RX ORDER — ALBUTEROL SULFATE 2.5 MG/3ML
2.5 SOLUTION RESPIRATORY (INHALATION)
Status: DISCONTINUED | OUTPATIENT
Start: 2019-07-24 | End: 2019-07-24

## 2019-07-24 RX ORDER — OXYCODONE HYDROCHLORIDE AND ACETAMINOPHEN 5; 325 MG/1; MG/1
1 TABLET ORAL EVERY 6 HOURS PRN
Status: DISCONTINUED | OUTPATIENT
Start: 2019-07-24 | End: 2019-07-26 | Stop reason: HOSPADM

## 2019-07-24 RX ORDER — INSULIN GLARGINE 100 [IU]/ML
30 INJECTION, SOLUTION SUBCUTANEOUS 2 TIMES DAILY
Status: DISCONTINUED | OUTPATIENT
Start: 2019-07-24 | End: 2019-07-26 | Stop reason: HOSPADM

## 2019-07-24 RX ORDER — MAGNESIUM SULFATE 1 G/100ML
1 INJECTION INTRAVENOUS PRN
Status: DISCONTINUED | OUTPATIENT
Start: 2019-07-24 | End: 2019-07-26 | Stop reason: HOSPADM

## 2019-07-24 RX ORDER — 0.9 % SODIUM CHLORIDE 0.9 %
30 INTRAVENOUS SOLUTION INTRAVENOUS ONCE
Status: COMPLETED | OUTPATIENT
Start: 2019-07-24 | End: 2019-07-24

## 2019-07-24 RX ADMIN — SODIUM CHLORIDE: 9 INJECTION, SOLUTION INTRAVENOUS at 12:05

## 2019-07-24 RX ADMIN — INSULIN LISPRO 6 UNITS: 100 INJECTION, SOLUTION INTRAVENOUS; SUBCUTANEOUS at 17:53

## 2019-07-24 RX ADMIN — INSULIN LISPRO 15 UNITS: 100 INJECTION, SOLUTION INTRAVENOUS; SUBCUTANEOUS at 11:58

## 2019-07-24 RX ADMIN — QUETIAPINE FUMARATE 400 MG: 200 TABLET ORAL at 22:03

## 2019-07-24 RX ADMIN — ASPIRIN 325 MG: 325 TABLET, COATED ORAL at 11:57

## 2019-07-24 RX ADMIN — FLUOXETINE 20 MG: 20 CAPSULE ORAL at 11:58

## 2019-07-24 RX ADMIN — CARBAMAZEPINE 200 MG: 200 TABLET ORAL at 11:58

## 2019-07-24 RX ADMIN — OXYCODONE HYDROCHLORIDE AND ACETAMINOPHEN 1 TABLET: 5; 325 TABLET ORAL at 20:23

## 2019-07-24 RX ADMIN — MEROPENEM 1 G: 1 INJECTION, POWDER, FOR SOLUTION INTRAVENOUS at 07:58

## 2019-07-24 RX ADMIN — GABAPENTIN 800 MG: 400 CAPSULE ORAL at 11:57

## 2019-07-24 RX ADMIN — INSULIN LISPRO 3 UNITS: 100 INJECTION, SOLUTION INTRAVENOUS; SUBCUTANEOUS at 20:27

## 2019-07-24 RX ADMIN — INSULIN GLARGINE 30 UNITS: 100 INJECTION, SOLUTION SUBCUTANEOUS at 20:27

## 2019-07-24 RX ADMIN — BUSPIRONE HYDROCHLORIDE 10 MG: 5 TABLET ORAL at 21:56

## 2019-07-24 RX ADMIN — CARBAMAZEPINE 200 MG: 200 TABLET ORAL at 15:36

## 2019-07-24 RX ADMIN — OXYCODONE HYDROCHLORIDE AND ACETAMINOPHEN 1 TABLET: 5; 325 TABLET ORAL at 14:08

## 2019-07-24 RX ADMIN — IPRATROPIUM BROMIDE AND ALBUTEROL SULFATE 1 AMPULE: .5; 2.5 SOLUTION RESPIRATORY (INHALATION) at 08:05

## 2019-07-24 RX ADMIN — SODIUM CHLORIDE: 9 INJECTION, SOLUTION INTRAVENOUS at 21:55

## 2019-07-24 RX ADMIN — CARBAMAZEPINE 200 MG: 200 TABLET ORAL at 21:55

## 2019-07-24 RX ADMIN — INSULIN GLARGINE 30 UNITS: 100 INJECTION, SOLUTION SUBCUTANEOUS at 12:41

## 2019-07-24 RX ADMIN — ENOXAPARIN SODIUM 40 MG: 40 INJECTION SUBCUTANEOUS at 11:56

## 2019-07-24 RX ADMIN — CYCLOBENZAPRINE HYDROCHLORIDE 10 MG: 10 TABLET, FILM COATED ORAL at 21:55

## 2019-07-24 RX ADMIN — GABAPENTIN 800 MG: 400 CAPSULE ORAL at 15:34

## 2019-07-24 RX ADMIN — SODIUM CHLORIDE: 4.5 INJECTION, SOLUTION INTRAVENOUS at 07:58

## 2019-07-24 RX ADMIN — BUSPIRONE HYDROCHLORIDE 10 MG: 5 TABLET ORAL at 15:34

## 2019-07-24 RX ADMIN — CYCLOBENZAPRINE HYDROCHLORIDE 10 MG: 10 TABLET, FILM COATED ORAL at 11:57

## 2019-07-24 RX ADMIN — BUSPIRONE HYDROCHLORIDE 10 MG: 5 TABLET ORAL at 11:57

## 2019-07-24 RX ADMIN — CYCLOBENZAPRINE HYDROCHLORIDE 10 MG: 10 TABLET, FILM COATED ORAL at 15:34

## 2019-07-24 RX ADMIN — SODIUM CHLORIDE 2178 ML: 9 INJECTION, SOLUTION INTRAVENOUS at 01:20

## 2019-07-24 RX ADMIN — NALOXONE HYDROCHLORIDE 0.4 MG: 0.4 INJECTION, SOLUTION INTRAMUSCULAR; INTRAVENOUS; SUBCUTANEOUS at 04:29

## 2019-07-24 RX ADMIN — GABAPENTIN 800 MG: 400 CAPSULE ORAL at 21:55

## 2019-07-24 RX ADMIN — INSULIN HUMAN 10 UNITS: 100 INJECTION, SOLUTION PARENTERAL at 01:21

## 2019-07-24 ASSESSMENT — PAIN SCALES - GENERAL
PAINLEVEL_OUTOF10: 6
PAINLEVEL_OUTOF10: 8
PAINLEVEL_OUTOF10: 0
PAINLEVEL_OUTOF10: 8

## 2019-07-24 ASSESSMENT — PAIN DESCRIPTION - PAIN TYPE: TYPE: CHRONIC PAIN

## 2019-07-24 ASSESSMENT — PAIN DESCRIPTION - FREQUENCY: FREQUENCY: INTERMITTENT

## 2019-07-24 ASSESSMENT — PULMONARY FUNCTION TESTS
PIF_VALUE: 19

## 2019-07-24 ASSESSMENT — PAIN DESCRIPTION - ONSET: ONSET: ON-GOING

## 2019-07-24 ASSESSMENT — PAIN DESCRIPTION - PROGRESSION: CLINICAL_PROGRESSION: GRADUALLY IMPROVING

## 2019-07-24 ASSESSMENT — PAIN - FUNCTIONAL ASSESSMENT: PAIN_FUNCTIONAL_ASSESSMENT: PREVENTS OR INTERFERES SOME ACTIVE ACTIVITIES AND ADLS

## 2019-07-24 ASSESSMENT — PAIN DESCRIPTION - DESCRIPTORS: DESCRIPTORS: ACHING

## 2019-07-24 ASSESSMENT — PAIN DESCRIPTION - LOCATION: LOCATION: GENERALIZED

## 2019-07-24 NOTE — PROGRESS NOTES
Nutrition Assessment    Type and Reason for Visit: Initial(patient is homeless and has DM (uncontrolled) and cannot afford supplies/medications)    Nutrition Recommendations:   1. Started CCC-3 diet order this am.   2. Monitor appetite, po intake, and s/s of GI distress associated with po intake. 3. Monitor for in-patient weight changes. 4. Monitor nutrition-related labs and bowel function/regimen. Nutrition Assessment: patient is nutritionally compromised AEB lack of or limited access to nutritious food d/t homelessness and uncontrolled DM with financial strain to purchase diabetic supplies/medications and she is at risk for further compromise d/t lack of access to diabetic supplies/financial means to purchase diabetic supplies/medications, altered nutrition-related labs, and hx of depression; started CCC-3 diet order this am during rounds and will monitor nutrition status    Malnutrition Assessment:  · Malnutrition Status: At risk for malnutrition  · Context: Social or environmental circumstances  · Findings of the 6 clinical characteristics of malnutrition (Minimum of 2 out of 6 clinical characteristics is required to make the diagnosis of moderate or severe Protein Calorie Malnutrition based on AND/ASPEN Guidelines):  1. Energy Intake-Unable to assess, Greater than or equal to 5 days    2. Weight Loss-No significant weight loss, in 1 year  3. Fat Loss-No significant subcutaneous fat loss,    4. Muscle Loss-No significant muscle mass loss,    5. Fluid Accumulation-No significant fluid accumulation,    6.   Strength-Not measured    Nutrition Risk Level: High    Nutrient Needs:  · Estimated Daily Total Kcal: 1460 - 1825 kcals based on 20-25 kcals/kg/CBW  · Estimated Daily Protein (g): 71 - 83 g protein based on 1.2-1.4 g/kg/IBW  · Estimated Daily Total Fluid (ml/day): 1400 - 1800 ml     Nutrition Diagnosis:   · Problem: Altered nutrition-related lab values  · Etiology: related to Endocrine dysfunction, Psychological cause/life stress, Other (Comment)(lack of diabetic supplies/medications)     Signs and symptoms:  as evidenced by Patient report of, Diet history of poor intake, Lab values, GI abnormality, Nausea, Other (Comment)(homelessness; lack of access to diabetic supplies/medications)    Objective Information:  · Nutrition-Focused Physical Findings: patient is edentulous; she has a flat affect; patient is soft-spoken when she does speak; hair is thin and course; skin color is appropriate for ethnicity; no wounds or edema present; patient reported N/V PTA - likely r/t uncontrolled diabetes and elevated BS upon admission  · Wound Type: None  · Current Nutrition Therapies:  · Oral Diet Orders: Carb Control 3 Carbs/Meal   · Oral Diet intake: Unable to assess  · Oral Nutrition Supplement (ONS) Orders: None  · ONS intake: (none ordered)  · Anthropometric Measures:  · Ht: 5' 6\" (167.6 cm)   · Current Body Wt: 160 lb (72.6 kg)(estimated weight )  · Admission Body Wt: 160 lb (72.6 kg)(estimated weight )  · Usual Body Wt: (150's, per past weights)  · Weight Change:  unable to assess - no significant weight changes observed in past weight trends x 1+ years  · Ideal Body Wt: 130 lb (59 kg), % Ideal Body 123%  · BMI Classification: BMI 25.0 - 29.9 Overweight    Nutrition Interventions:   Start oral diet  Continued Inpatient Monitoring, Coordination of Care, Coordination of Community Care    Nutrition Evaluation:   · Evaluation: Goals set   · Goals: patient will consume 50% or greater of meals on CCC-3 diet order x 3 meals per day with BS being managed/controlled and without BS > 180 mg/dl    · Monitoring: Meal Intake, Diet Tolerance, Weight, Pertinent Labs, Chewing/Swallowing, Nausea or Vomiting, Monitor Bowel Function      Electronically signed by Suzan Badillo RD, LD on 7/24/19 at 12:27 PM    Contact Number: [phone]

## 2019-07-24 NOTE — ED NOTES
Patient resting in bed, semi upright, patient denies any pain/needs     Brandyn Carlos RN  07/24/19 1843

## 2019-07-24 NOTE — H&P
oxyCODONE-acetaminophen       reports that she has never smoked. She has never used smokeless tobacco.    Family History   Problem Relation Age of Onset    Heart Disease Mother     Heart Disease Father        Social History     Socioeconomic History    Marital status:       Spouse name: None    Number of children: None    Years of education: None    Highest education level: None   Occupational History    None   Social Needs    Financial resource strain: None    Food insecurity:     Worry: None     Inability: None    Transportation needs:     Medical: None     Non-medical: None   Tobacco Use    Smoking status: Never Smoker    Smokeless tobacco: Never Used   Substance and Sexual Activity    Alcohol use: No    Drug use: No    Sexual activity: Not Currently   Lifestyle    Physical activity:     Days per week: None     Minutes per session: None    Stress: None   Relationships    Social connections:     Talks on phone: None     Gets together: None     Attends Jain service: None     Active member of club or organization: None     Attends meetings of clubs or organizations: None     Relationship status: None    Intimate partner violence:     Fear of current or ex partner: None     Emotionally abused: None     Physically abused: None     Forced sexual activity: None   Other Topics Concern    None   Social History Narrative    None     REVIEW OF SYSTEMS:   Constitutional: Negative for fever   HENT: Negative for sore throat   Eyes: Negative for redness   Respiratory: Negative for dyspnea, cough   Cardiovascular: Negative for chest pain   Gastrointestinal: Negative for  diarrhea   Genitourinary: Negative for hematuria   Musculoskeletal: Negative for arthralgias   Skin: Negative for rash   Neurological: Negative for syncope   Hematological: Negative for adenopathy   Psychiatric/Behavorial: Negative for anxiety      Vitals:    07/24/19 1100   BP: 122/70   Pulse: 88   Resp: 14   Temp:    SpO2:

## 2019-07-24 NOTE — PROGRESS NOTES
Inpatient Occupational Therapy  Evaluation and Treatment    Unit: ICU  Date:  2019  Patient Name:    Lucilla Bumpers  Admitting diagnosis:  DKA, type 2, not at goal [facility]) [E11.10]  Admit Date:  2019  Precautions/Restrictions/WB Status/ Lines/ Wounds/ Oxygen: fall risk, IV and bed/chair alarm     Patient Active Problem List   Diagnosis    Diabetes ([facility]    Hypertension    Hyperlipidemia    Depression    Insomnia    Neuropathy    Major depressive disorder, recurrent, in remission ([facility]    Uncontrolled diabetes mellitus ([facility]    Bipolar affective disorder ([facility]    HTN (hypertension), benign    DM (diabetes mellitus), secondary, uncontrolled, w/neurologic complic ([facility]    Trigeminal neuralgia of left side of face    DKA, type 2, not at goal [facility])       Treatment Time:  5586-0825  Treatment Number: 1   Billable Treatment Time: 17 minutes   Total Treatment Time:   27   minutes    Patient Goals for Therapy:  \" go home I guess \"      Discharge Recommendations: Home with  supervision and home therapy  DME needs for discharge: RW       Therapy recommendations for staff:   Assist of 1 with use of rolling walker (RW) for all ambulation to/from bathroom      Home Health S4 Level Recommendation:  Level 1 Standard  AM-PAC Score: 20    Preadmission Environment    Pt. [facility]  Steps to enter first floor: N/A  Bathroom: Walk-in Shower, Grab bars and Shower Chair , hand-held shower              Elevated commode with grab bars, commode with no grab bars (states able to use both)  Equipment owned: [facility]     Preadmission Status:  Pt. Able to drive: No   Pt Fully independent with ADLs: Yes  Pt. Required assistance from family for: Cleaning and Cooking; IND with laundry  Pt.  Fully independent for transfers and gait and walked with [employer]  History of falls Yes - reports 18 falls in past 3 mo, reports \"black out\" prior to    Pain  Yes  Ratin  Location:back, R foot, ribs  Pain Medicine Status: Pain

## 2019-07-24 NOTE — PROGRESS NOTES
FiO2 decreased to 50%         07/24/19 0721   Vent Information   Vt Ordered 450 mL   Rate Set 16 bmp   Peak Flow 60 L/min   Pressure Support 0 cmH20   FiO2  50 %   Sensitivity 2   PEEP/CPAP 5   I Time/ I Time % 0 s   Vent Patient Data   High Peep/I Pressure 0   Peak Inspiratory Pressure 19 cmH2O   Mean Airway Pressure 9.9 cmH20   Rate Measured 24 br/min   Vt Exhaled 476 mL   Minute Volume 11.3 Liters   I:E Ratio 1:2.10   Spontaneous Breathing Trial (SBT) RT Doc   Pulse 83   SpO2 99 %   Additional Respiratory  Assessments   Resp 12   Alarm Settings   High Pressure Alarm 45 cmH2O   Low Minute Volume Alarm 4 L/min   High Respiratory Rate 40 br/min

## 2019-07-24 NOTE — ED PROVIDER NOTES
>=1000 (A) Negative mg/dL    Bilirubin Urine Negative Negative    Ketones, Urine 15 (A) Negative mg/dL    Specific Gravity, UA <=1.005 1.005 - 1.030    Blood, Urine Negative Negative    pH, UA 6.0 5.0 - 8.0    Protein, UA Negative Negative mg/dL    Urobilinogen, Urine 0.2 <2.0 E.U./dL    Nitrite, Urine Negative Negative    Leukocyte Esterase, Urine Negative Negative    Microscopic Examination Not Indicated     Urine Reflex to Culture Not Indicated     Urine Type Not Specified    Troponin   Result Value Ref Range    Troponin <0.01 <0.01 ng/mL   Drug screen multi urine   Result Value Ref Range    Amphetamine Screen, Urine Neg Negative <1000ng/mL    Barbiturate Screen, Ur Neg Negative <200 ng/mL    Benzodiazepine Screen, Urine Neg Negative <200 ng/mL    Cannabinoid Scrn, Ur Neg Negative <50 ng/mL    Cocaine Metabolite Screen, Urine Neg Negative <300 ng/mL    Opiate Scrn, Ur Neg Negative <300 ng/mL    PCP Screen, Urine Neg Negative <25 ng/mL    Methadone Screen, Urine Neg Negative <300 ng/mL    Propoxyphene Scrn, Ur Neg Negative <300 ng/mL    pH, UA 6.0     Drug Screen Comment: see below     Oxycodone Urine Neg Negative <100 ng/ml   Troponin   Result Value Ref Range    Troponin <0.01 <0.01 ng/mL   Basic Metabolic Panel   Result Value Ref Range    Sodium 133 (L) 136 - 145 mmol/L    Potassium 4.0 3.5 - 5.1 mmol/L    Chloride 95 (L) 99 - 110 mmol/L    CO2 23 21 - 32 mmol/L    Anion Gap 15 3 - 16    Glucose 432 (H) 70 - 99 mg/dL    BUN 21 (H) 7 - 20 mg/dL    CREATININE 0.9 0.6 - 1.2 mg/dL    GFR Non-African American >60 >60    GFR African American >60 >60    Calcium 8.3 8.3 - 10.6 mg/dL   Magnesium   Result Value Ref Range    Magnesium 2.30 1.80 - 2.40 mg/dL   Ethanol   Result Value Ref Range    Ethanol Lvl None Detected mg/dL   Procalcitonin   Result Value Ref Range    Procalcitonin 0.05 0.00 - 0.15 ng/mL   POCT Glucose   Result Value Ref Range    POC Glucose >600 (AA) 70 - 99 mg/dl    Performed on ACCU-CHEK    POCT

## 2019-07-24 NOTE — PLAN OF CARE
60 yoF from homeless shelter c/o feeling weak. Found to be hyperglycemic and meet DKA Dx except for pH on VBG. , AG 17, BHB 1.3. Hypersomnolence in ED, not responsive to Narcan 0.4 mg.  Per Ed physician no CP. HCAP  Possible sepsis  Early DKA/hyperosmolar  Hypotension, improved w/ IVF. Altered MS  EKG changes, possibly related to electrolyte abn.   Defer cards c/s to DD or intensivist.

## 2019-07-24 NOTE — CONSULTS
stent      Moderate diffuse disease      Assessment:  Tony Castillo is a 60 y.o. patient who presented to [facility] 7/24/19 with c/o dizziness, weakness, and \"passing out. \". She has been followed in past by Dr. Deb Locke cardiology care [address] [place] [address] 4/14/17 but no routine cardiac f/u. She has PMH bipolar d/o, panic attacks/anxiety, HTN, HLD, chest pain syndrome, CAD s/p stent mid LAD 1/17,  type II DM, and reported mitral valve prolapse. Most recent Lexiscan myoview stress test 12/20/16 EF 62% Tiny focus decreased activity anterior inferior apical wall  She underwent LHC 1/12/17 and was found have 1V CAD 80% mid-LAD stenosis for which she under PCI with a ISABEL. She was rehospitalized with CP and underwent repeat cor aniography 1/20/17 and was confirmed to have a widely patent stent. Most recent ECHO 7/14/19 EF 60-65%. Grade 1 DD, mitral valve leaflets are mildly calcified, Aortic Valve is mildly calcified. Presented to ER from shelter after getting dizzy and weak walking from bathroom back to her bed. She \"passed out\" and felt \"weird\" but could not be more specific. Denied any chest pain, SOB, or palpitations prior to event. In the ED her admit EKG revealed NSR; possible LAE; ST elevation, consider early repolarization, pericarditis, or injury. Note CXR showed new infiltrate at the left lung base. Troponin neg x 3, glucose 86, sodium 122. Diagnosis of abnormal EKG with ST elevation likely related to metabolic issues given DKA and severely elevated blood sugar. She never had symptoms of CP and r/o'd for MI by serial Batool enzymes. I reviewed all of her EKG's and the most recent EKG shows NSR without any ST changes. The prior ST elevation has resolved. Recs:  1. No need for cardiac testing at this time. She had recent ECHO 7/14/19 (see above). 2. Continue aspirin. She has CAD s/p mid-LAD stent 2017 and should be on statin. I recommend checking fasting lipids and start statin accordingly.  No prior MI so would not need beta blocker

## 2019-07-25 LAB
ANION GAP SERPL CALCULATED.3IONS-SCNC: 10 MMOL/L (ref 3–16)
ANION GAP SERPL CALCULATED.3IONS-SCNC: 10 MMOL/L (ref 3–16)
ANION GAP SERPL CALCULATED.3IONS-SCNC: 8 MMOL/L (ref 3–16)
BASOPHILS ABSOLUTE: 0 K/UL (ref 0–0.2)
BASOPHILS RELATIVE PERCENT: 0.8 %
BUN BLDV-MCNC: 13 MG/DL (ref 7–20)
CALCIUM SERPL-MCNC: 8.2 MG/DL (ref 8.3–10.6)
CALCIUM SERPL-MCNC: 8.3 MG/DL (ref 8.3–10.6)
CALCIUM SERPL-MCNC: 8.4 MG/DL (ref 8.3–10.6)
CHLORIDE BLD-SCNC: 102 MMOL/L (ref 99–110)
CHLORIDE BLD-SCNC: 102 MMOL/L (ref 99–110)
CHLORIDE BLD-SCNC: 103 MMOL/L (ref 99–110)
CO2: 25 MMOL/L (ref 21–32)
CO2: 26 MMOL/L (ref 21–32)
CO2: 26 MMOL/L (ref 21–32)
CREAT SERPL-MCNC: 0.6 MG/DL (ref 0.6–1.2)
EOSINOPHILS ABSOLUTE: 0.2 K/UL (ref 0–0.6)
EOSINOPHILS RELATIVE PERCENT: 3.7 %
GFR AFRICAN AMERICAN: >60
GFR NON-AFRICAN AMERICAN: >60
GLUCOSE BLD-MCNC: 114 MG/DL (ref 70–99)
GLUCOSE BLD-MCNC: 155 MG/DL (ref 70–99)
GLUCOSE BLD-MCNC: 185 MG/DL (ref 70–99)
GLUCOSE BLD-MCNC: 196 MG/DL (ref 70–99)
GLUCOSE BLD-MCNC: 253 MG/DL (ref 70–99)
GLUCOSE BLD-MCNC: 265 MG/DL (ref 70–99)
GLUCOSE BLD-MCNC: 328 MG/DL (ref 70–99)
GLUCOSE BLD-MCNC: 386 MG/DL (ref 70–99)
HCT VFR BLD CALC: 35.8 % (ref 36–48)
HEMOGLOBIN: 11.7 G/DL (ref 12–16)
LYMPHOCYTES ABSOLUTE: 2.2 K/UL (ref 1–5.1)
LYMPHOCYTES RELATIVE PERCENT: 42.4 %
MAGNESIUM: 2 MG/DL (ref 1.8–2.4)
MAGNESIUM: 2.1 MG/DL (ref 1.8–2.4)
MAGNESIUM: 2.2 MG/DL (ref 1.8–2.4)
MCH RBC QN AUTO: 27.7 PG (ref 26–34)
MCHC RBC AUTO-ENTMCNC: 32.8 G/DL (ref 31–36)
MCV RBC AUTO: 84.4 FL (ref 80–100)
MONOCYTES ABSOLUTE: 0.4 K/UL (ref 0–1.3)
MONOCYTES RELATIVE PERCENT: 8.6 %
NEUTROPHILS ABSOLUTE: 2.3 K/UL (ref 1.7–7.7)
NEUTROPHILS RELATIVE PERCENT: 44.5 %
PDW BLD-RTO: 14.7 % (ref 12.4–15.4)
PERFORMED ON: ABNORMAL
PHOSPHORUS: 2.6 MG/DL (ref 2.5–4.9)
PHOSPHORUS: 3 MG/DL (ref 2.5–4.9)
PHOSPHORUS: 3.1 MG/DL (ref 2.5–4.9)
PLATELET # BLD: 203 K/UL (ref 135–450)
PMV BLD AUTO: 8.1 FL (ref 5–10.5)
POTASSIUM SERPL-SCNC: 3.7 MMOL/L (ref 3.5–5.1)
POTASSIUM SERPL-SCNC: 4 MMOL/L (ref 3.5–5.1)
POTASSIUM SERPL-SCNC: 4.3 MMOL/L (ref 3.5–5.1)
RBC # BLD: 4.24 M/UL (ref 4–5.2)
SODIUM BLD-SCNC: 136 MMOL/L (ref 136–145)
SODIUM BLD-SCNC: 138 MMOL/L (ref 136–145)
SODIUM BLD-SCNC: 138 MMOL/L (ref 136–145)
WBC # BLD: 5.1 K/UL (ref 4–11)

## 2019-07-25 PROCEDURE — 6370000000 HC RX 637 (ALT 250 FOR IP): Performed by: INTERNAL MEDICINE

## 2019-07-25 PROCEDURE — 84100 ASSAY OF PHOSPHORUS: CPT

## 2019-07-25 PROCEDURE — 97530 THERAPEUTIC ACTIVITIES: CPT

## 2019-07-25 PROCEDURE — 2500000003 HC RX 250 WO HCPCS: Performed by: INTERNAL MEDICINE

## 2019-07-25 PROCEDURE — 6360000002 HC RX W HCPCS: Performed by: INTERNAL MEDICINE

## 2019-07-25 PROCEDURE — 2580000003 HC RX 258: Performed by: INTERNAL MEDICINE

## 2019-07-25 PROCEDURE — 80048 BASIC METABOLIC PNL TOTAL CA: CPT

## 2019-07-25 PROCEDURE — 36415 COLL VENOUS BLD VENIPUNCTURE: CPT

## 2019-07-25 PROCEDURE — 97116 GAIT TRAINING THERAPY: CPT

## 2019-07-25 PROCEDURE — 97535 SELF CARE MNGMENT TRAINING: CPT

## 2019-07-25 PROCEDURE — 83735 ASSAY OF MAGNESIUM: CPT

## 2019-07-25 PROCEDURE — 99232 SBSQ HOSP IP/OBS MODERATE 35: CPT | Performed by: INTERNAL MEDICINE

## 2019-07-25 PROCEDURE — 1200000000 HC SEMI PRIVATE

## 2019-07-25 PROCEDURE — 85025 COMPLETE CBC W/AUTO DIFF WBC: CPT

## 2019-07-25 RX ORDER — NICOTINE POLACRILEX 4 MG
15 LOZENGE BUCCAL PRN
Status: DISCONTINUED | OUTPATIENT
Start: 2019-07-25 | End: 2019-07-26 | Stop reason: HOSPADM

## 2019-07-25 RX ORDER — GABAPENTIN 400 MG/1
400 CAPSULE ORAL 3 TIMES DAILY
Status: DISCONTINUED | OUTPATIENT
Start: 2019-07-25 | End: 2019-07-26 | Stop reason: HOSPADM

## 2019-07-25 RX ORDER — DEXTROSE MONOHYDRATE 50 MG/ML
100 INJECTION, SOLUTION INTRAVENOUS PRN
Status: DISCONTINUED | OUTPATIENT
Start: 2019-07-25 | End: 2019-07-26 | Stop reason: HOSPADM

## 2019-07-25 RX ORDER — DEXTROSE MONOHYDRATE 25 G/50ML
12.5 INJECTION, SOLUTION INTRAVENOUS PRN
Status: DISCONTINUED | OUTPATIENT
Start: 2019-07-25 | End: 2019-07-26 | Stop reason: HOSPADM

## 2019-07-25 RX ADMIN — BUSPIRONE HYDROCHLORIDE 10 MG: 5 TABLET ORAL at 20:48

## 2019-07-25 RX ADMIN — CYCLOBENZAPRINE HYDROCHLORIDE 10 MG: 10 TABLET, FILM COATED ORAL at 08:07

## 2019-07-25 RX ADMIN — ASPIRIN 325 MG: 325 TABLET, COATED ORAL at 08:08

## 2019-07-25 RX ADMIN — MUPIROCIN: 20 OINTMENT TOPICAL at 20:55

## 2019-07-25 RX ADMIN — ENOXAPARIN SODIUM 40 MG: 40 INJECTION SUBCUTANEOUS at 08:08

## 2019-07-25 RX ADMIN — OXYCODONE HYDROCHLORIDE AND ACETAMINOPHEN 1 TABLET: 5; 325 TABLET ORAL at 06:37

## 2019-07-25 RX ADMIN — INSULIN LISPRO 2 UNITS: 100 INJECTION, SOLUTION INTRAVENOUS; SUBCUTANEOUS at 20:50

## 2019-07-25 RX ADMIN — INSULIN LISPRO 12 UNITS: 100 INJECTION, SOLUTION INTRAVENOUS; SUBCUTANEOUS at 12:26

## 2019-07-25 RX ADMIN — GABAPENTIN 800 MG: 400 CAPSULE ORAL at 08:08

## 2019-07-25 RX ADMIN — CARBAMAZEPINE 200 MG: 200 TABLET ORAL at 08:07

## 2019-07-25 RX ADMIN — INSULIN LISPRO 9 UNITS: 100 INJECTION, SOLUTION INTRAVENOUS; SUBCUTANEOUS at 12:56

## 2019-07-25 RX ADMIN — BUSPIRONE HYDROCHLORIDE 10 MG: 5 TABLET ORAL at 13:34

## 2019-07-25 RX ADMIN — QUETIAPINE FUMARATE 400 MG: 200 TABLET ORAL at 20:47

## 2019-07-25 RX ADMIN — INSULIN GLARGINE 30 UNITS: 100 INJECTION, SOLUTION SUBCUTANEOUS at 08:17

## 2019-07-25 RX ADMIN — INSULIN GLARGINE 30 UNITS: 100 INJECTION, SOLUTION SUBCUTANEOUS at 20:50

## 2019-07-25 RX ADMIN — SODIUM CHLORIDE: 9 INJECTION, SOLUTION INTRAVENOUS at 06:36

## 2019-07-25 RX ADMIN — GABAPENTIN 400 MG: 400 CAPSULE ORAL at 20:48

## 2019-07-25 RX ADMIN — OXYCODONE HYDROCHLORIDE AND ACETAMINOPHEN 1 TABLET: 5; 325 TABLET ORAL at 20:47

## 2019-07-25 RX ADMIN — CARBAMAZEPINE 200 MG: 200 TABLET ORAL at 13:34

## 2019-07-25 RX ADMIN — INSULIN LISPRO 12 UNITS: 100 INJECTION, SOLUTION INTRAVENOUS; SUBCUTANEOUS at 08:17

## 2019-07-25 RX ADMIN — BUSPIRONE HYDROCHLORIDE 10 MG: 5 TABLET ORAL at 08:08

## 2019-07-25 RX ADMIN — FLUOXETINE 20 MG: 20 CAPSULE ORAL at 08:07

## 2019-07-25 RX ADMIN — ESOMEPRAZOLE SODIUM 40 MG: 40 INJECTION INTRAVENOUS at 06:37

## 2019-07-25 RX ADMIN — CARBAMAZEPINE 200 MG: 200 TABLET ORAL at 20:48

## 2019-07-25 ASSESSMENT — PAIN DESCRIPTION - LOCATION: LOCATION: BACK;HIP;LEG

## 2019-07-25 ASSESSMENT — PAIN - FUNCTIONAL ASSESSMENT: PAIN_FUNCTIONAL_ASSESSMENT: ACTIVITIES ARE NOT PREVENTED

## 2019-07-25 ASSESSMENT — PAIN DESCRIPTION - PAIN TYPE: TYPE: CHRONIC PAIN

## 2019-07-25 ASSESSMENT — PAIN DESCRIPTION - ORIENTATION: ORIENTATION: OTHER (COMMENT)

## 2019-07-25 ASSESSMENT — PAIN DESCRIPTION - DESCRIPTORS: DESCRIPTORS: DISCOMFORT

## 2019-07-25 ASSESSMENT — PAIN SCALES - GENERAL
PAINLEVEL_OUTOF10: 3
PAINLEVEL_OUTOF10: 8
PAINLEVEL_OUTOF10: 8

## 2019-07-25 ASSESSMENT — PAIN DESCRIPTION - FREQUENCY: FREQUENCY: CONTINUOUS

## 2019-07-25 ASSESSMENT — PAIN DESCRIPTION - ONSET: ONSET: ON-GOING

## 2019-07-25 ASSESSMENT — PAIN DESCRIPTION - PROGRESSION: CLINICAL_PROGRESSION: GRADUALLY WORSENING

## 2019-07-25 NOTE — PROGRESS NOTES
RDW 14.8 14.7    203     BMP:   Recent Labs     07/25/19  0005 07/25/19  0355 07/25/19  0755    138 136   K 3.7 4.0 4.3    103 102   CO2 26 25 26   PHOS 3.0 3.1 2.6   BUN 13 13 13   CREATININE 0.6 0.6 0.6     BNP: No results for input(s): BNP in the last 72 hours. PT/INR: No results for input(s): PROTIME, INR in the last 72 hours. APTT:No results for input(s): APTT in the last 72 hours. CARDIAC ENZYMES:   Recent Labs     07/24/19  0300 07/24/19  0749 07/24/19  1141   TROPONINI <0.01 <0.01 <0.01     FASTING LIPID PANEL:  Lab Results   Component Value Date    CHOL 124 07/24/2019    HDL 24 07/24/2019    TRIG 333 07/24/2019     LIVER PROFILE:   Recent Labs     07/24/19  0020   AST 11*   ALT 14   BILITOT 0.4   ALKPHOS 154*      CXR    Improved lung volumes likely relating to better inspiratory effort. There is improved aeration to the lung bases with still some mild streaky  bibasilar airspace opacities, left more than right.  Findings may be on the  basis of atelectasis although multifocal pneumonia or aspiration pneumonitis  could have a similar appearance.     Assessment & Plan:     Patient Active Problem List    Diagnosis Date Noted    DM (diabetes mellitus) type 2, uncontrolled, with ketoacidosis ([address] 07/24/2019    Abnormal EKG     Coronary artery disease involving native coronary artery of native heart without angina pectoris     Diabetic ketoacidosis without coma associated with type 2 diabetes mellitus ([address]     Abnormal CXR     HTN (hypertension), benign 12/14/2016    DM (diabetes mellitus), secondary, uncontrolled, w/neurologic complic ([address] 25/38/2174    Trigeminal neuralgia of left side of face 12/14/2016    Uncontrolled diabetes mellitus ([address] 04/13/2016    Major depressive disorder, recurrent, in remission ([address] 12/15/2015    Diabetes ([address]     Hypertension     Hyperlipidemia     Depression     Insomnia     Neuropathy     Bipolar affective disorder ([address]

## 2019-07-25 NOTE — PROGRESS NOTES
Pt up in chair. Lethargy continues, MD aware. Call light and bedside table within reach. Denies needs at this time.

## 2019-07-25 NOTE — CARE COORDINATION
INTERDISCIPLINARY PLAN OF CARE CONFERENCE    Date/Time: 7/25/2019 2:12 PM  Completed by: Huyen Carballo, Case Management      Patient Name:  Jonatan Dudley  YOB: 1955  Admitting Diagnosis: DKA, type 2, not at goal [facility]) [E11.10]     Admit Date/Time:  7/24/2019 12:03 AM    Chart reviewed. Interdisciplinary team met to discuss patient progress and discharge plans. Disciplines included Case Management, Nursing, and Dietitian. Current Status: inpatient    PT/OT recommendation: SNF    Anticipated Discharge Date: TBD  Expected D/C Disposition:  Skilled nursing facility  Confirmed plan with patient: No-pt unable to hold conversation today  Discharge Plan Comments: Plan: STR recommended. Pt very drowsy and would not awaken to hold conversation w/CM. Breathing easy and even, and regular,  skin pink. CM notified bedside RN. +PT is homeless and working with [place] [facility] w/[place] CM and APS on finding permanent housing. [facility] sent to [facility] for [facility] at a [facility] if bed available. +eCOC, +CM following    Home O2 in place on admit: No  Pt informed of need to bring portable home O2 tank on day of discharge for nursing to connect prior to leaving:  No  Verbalized agreement/Understanding:  No    15;30 Beds available at [facility] at [address] [place] [address]) and [facility]).

## 2019-07-25 NOTE — PLAN OF CARE
Problem: Nutrition  Goal: Optimal nutrition therapy  7/25/2019 0139 by Allen García RN  Outcome: Ongoing  7/25/2019 0135 by Allen García RN  Outcome: Ongoing  7/24/2019 1226 by Peter Calixto RD, LD  Outcome: Ongoing  Goal: Understanding of nutritional guidelines  7/25/2019 0139 by Allen García RN  Outcome: Ongoing  7/25/2019 0135 by Allen García RN  Outcome: Ongoing  7/24/2019 1226 by Peter Calixto RD, LD  Outcome: Ongoing     Problem: Infection:  Goal: Will remain free from infection  Description  Will remain free from infection  7/25/2019 0139 by Allen García RN  Outcome: Ongoing  7/25/2019 0135 by Allen García RN  Outcome: Ongoing     Problem: Safety:  Goal: Free from accidental physical injury  Description  Free from accidental physical injury  7/25/2019 0139 by Allen García RN  Outcome: Ongoing  7/25/2019 0135 by Allen García RN  Outcome: Ongoing  Goal: Free from intentional harm  Description  Free from intentional harm  7/25/2019 0139 by Allen García RN  Outcome: Ongoing  7/25/2019 0135 by Allen García RN  Outcome: Ongoing     Problem: Daily Care:  Goal: Daily care needs are met  Description  Daily care needs are met  7/25/2019 0139 by Allen García RN  Outcome: Ongoing  7/25/2019 0135 by Allen García RN  Outcome: Ongoing     Problem: Pain:  Goal: Patient's pain/discomfort is manageable  Description  Patient's pain/discomfort is manageable  7/25/2019 0139 by Allen García RN  Outcome: Ongoing  7/25/2019 0135 by Allen García RN  Outcome: Ongoing     Problem: Skin Integrity:  Goal: Skin integrity will stabilize  Description  Skin integrity will stabilize  7/25/2019 0139 by Allen García RN  Outcome: Ongoing  7/25/2019 0135 by Allen García RN  Outcome: Ongoing     Problem: Discharge Planning:  Goal: Patients continuum of care needs are met  Description  Patients continuum of care needs are met  7/25/2019 0139 by Allen García RN  Outcome: Ongoing  7/25/2019 0135 by rBit Tripathi RN  Outcome: Ongoing     Problem: Falls - Risk of:  Goal: Will remain free from falls  Description  Will remain free from falls  7/25/2019 0139 by Brit Tripathi RN  Outcome: Ongoing  7/25/2019 0135 by Brit Tripathi RN  Outcome: Ongoing  Goal: Absence of physical injury  Description  Absence of physical injury  7/25/2019 0139 by Brit Tripathi RN  Outcome: Ongoing  7/25/2019 0135 by Brit Tripathi RN  Outcome: Ongoing     Problem: Risk for Impaired Skin Integrity  Goal: Tissue integrity - skin and mucous membranes  Description  Structural intactness and normal physiological function of skin and  mucous membranes.   7/25/2019 0139 by Brit Tripathi RN  Outcome: Ongoing  7/25/2019 0135 by Brit Tripathi RN  Outcome: Ongoing

## 2019-07-26 VITALS
SYSTOLIC BLOOD PRESSURE: 167 MMHG | OXYGEN SATURATION: 94 % | HEART RATE: 110 BPM | DIASTOLIC BLOOD PRESSURE: 87 MMHG | WEIGHT: 167.55 LBS | RESPIRATION RATE: 16 BRPM | HEIGHT: 62 IN | TEMPERATURE: 97.4 F | BODY MASS INDEX: 30.83 KG/M2

## 2019-07-26 LAB
GLUCOSE BLD-MCNC: 100 MG/DL (ref 70–99)
GLUCOSE BLD-MCNC: 202 MG/DL (ref 70–99)
PERFORMED ON: ABNORMAL
PERFORMED ON: ABNORMAL

## 2019-07-26 PROCEDURE — 6370000000 HC RX 637 (ALT 250 FOR IP): Performed by: INTERNAL MEDICINE

## 2019-07-26 PROCEDURE — 99239 HOSP IP/OBS DSCHRG MGMT >30: CPT | Performed by: INTERNAL MEDICINE

## 2019-07-26 PROCEDURE — 6360000002 HC RX W HCPCS: Performed by: INTERNAL MEDICINE

## 2019-07-26 PROCEDURE — 2500000003 HC RX 250 WO HCPCS: Performed by: INTERNAL MEDICINE

## 2019-07-26 RX ORDER — LISINOPRIL 10 MG/1
10 TABLET ORAL DAILY
Qty: 30 TABLET | Refills: 0 | Status: ON HOLD
Start: 2019-07-26 | End: 2020-11-20

## 2019-07-26 RX ORDER — INSULIN GLARGINE 100 [IU]/ML
30 INJECTION, SOLUTION SUBCUTANEOUS 2 TIMES DAILY
Qty: 1 VIAL | Refills: 0 | Status: ON HOLD
Start: 2019-07-26 | End: 2020-11-20

## 2019-07-26 RX ORDER — GABAPENTIN 400 MG/1
400 CAPSULE ORAL 3 TIMES DAILY
Qty: 90 CAPSULE | Refills: 0 | Status: ON HOLD
Start: 2019-07-26 | End: 2020-11-30 | Stop reason: HOSPADM

## 2019-07-26 RX ORDER — HYDROCODONE BITARTRATE AND ACETAMINOPHEN 5; 325 MG/1; MG/1
1 TABLET ORAL EVERY 8 HOURS PRN
Qty: 6 TABLET | Refills: 0 | Status: SHIPPED | OUTPATIENT
Start: 2019-07-26 | End: 2019-07-28

## 2019-07-26 RX ADMIN — OXYCODONE HYDROCHLORIDE AND ACETAMINOPHEN 1 TABLET: 5; 325 TABLET ORAL at 08:56

## 2019-07-26 RX ADMIN — BUSPIRONE HYDROCHLORIDE 10 MG: 5 TABLET ORAL at 13:01

## 2019-07-26 RX ADMIN — GABAPENTIN 400 MG: 400 CAPSULE ORAL at 08:55

## 2019-07-26 RX ADMIN — CARBAMAZEPINE 200 MG: 200 TABLET ORAL at 13:01

## 2019-07-26 RX ADMIN — ESOMEPRAZOLE SODIUM 40 MG: 40 INJECTION INTRAVENOUS at 08:56

## 2019-07-26 RX ADMIN — INSULIN LISPRO 6 UNITS: 100 INJECTION, SOLUTION INTRAVENOUS; SUBCUTANEOUS at 08:57

## 2019-07-26 RX ADMIN — INSULIN LISPRO 12 UNITS: 100 INJECTION, SOLUTION INTRAVENOUS; SUBCUTANEOUS at 08:59

## 2019-07-26 RX ADMIN — ASPIRIN 325 MG: 325 TABLET, COATED ORAL at 08:55

## 2019-07-26 RX ADMIN — INSULIN GLARGINE 30 UNITS: 100 INJECTION, SOLUTION SUBCUTANEOUS at 08:58

## 2019-07-26 RX ADMIN — BUSPIRONE HYDROCHLORIDE 10 MG: 5 TABLET ORAL at 10:07

## 2019-07-26 RX ADMIN — FLUOXETINE 20 MG: 20 CAPSULE ORAL at 08:55

## 2019-07-26 RX ADMIN — ENOXAPARIN SODIUM 40 MG: 40 INJECTION SUBCUTANEOUS at 08:56

## 2019-07-26 RX ADMIN — CARBAMAZEPINE 200 MG: 200 TABLET ORAL at 08:56

## 2019-07-26 RX ADMIN — GABAPENTIN 400 MG: 400 CAPSULE ORAL at 13:01

## 2019-07-26 ASSESSMENT — PAIN SCALES - GENERAL
PAINLEVEL_OUTOF10: 4
PAINLEVEL_OUTOF10: 8

## 2019-07-26 NOTE — PROGRESS NOTES
Admit: 2019    Name:  Madhuri Leo  Room:  /3587-51  MRN:    [id number]     Daily Progress Note for 2019     Interval History:   Feels fatigued. Scheduled Meds:   gabapentin  400 mg Oral TID    insulin lispro  12 Units Subcutaneous TID WC    aspirin  325 mg Oral Daily    carBAMazepine  200 mg Oral TID    busPIRone  10 mg Oral TID    FLUoxetine  20 mg Oral Daily    enoxaparin  40 mg Subcutaneous Daily    esomeprazole  40 mg Intravenous QAM AC    QUEtiapine  400 mg Oral Nightly    insulin glargine  30 Units Subcutaneous BID    insulin lispro  0-18 Units Subcutaneous TID WC    insulin lispro  0-9 Units Subcutaneous Nightly    mupirocin   Nasal BID       Continuous Infusions:   dextrose         PRN Meds:  glucose, dextrose, glucagon (rDNA), dextrose, dextrose, potassium chloride, magnesium sulfate, sodium phosphate IVPB **OR** sodium phosphate IVPB **OR** sodium phosphate IVPB, oxyCODONE-acetaminophen                  Objective:     Temp  Av.7 °F (36.5 °C)  Min: 97.4 °F (36.3 °C)  Max: 98.1 °F (36.7 °C)  Pulse  Av.3  Min: 89  Max: 110  BP  Min: 124/70  Max: 167/87  SpO2  Av.3 %  Min: 94 %  Max: 97 %  Patient Vitals for the past 4 hrs:   BP Temp Temp src Pulse Resp SpO2 Weight   19 0845 (!) 167/87 97.4 °F (36.3 °C) Oral 110 16 94 % --   19 0629 -- -- -- -- -- -- 167 lb 8.8 oz (76 kg)         Intake/Output Summary (Last 24 hours) at 2019 0949  Last data filed at 2019 0914  Gross per 24 hour   Intake 780 ml   Output 900 ml   Net -120 ml       Physical Exam:  General:  Awake, alert and oriented. Appears to be not in any distress  Mucous Membranes:  Pink , anicteric  Neck: No JVD, no carotid bruit, no thyromegaly  Chest:  Clear to auscultation bilaterally, no added sounds  Cardiovascular:  RRR S1S2 heard, no murmurs or gallops  Abdomen:  Soft, undistended, non tender, no organomegaly, BS present  Extremities: No edema or cyanosis.  Distal pulses well felt  Neurological : no focal deficits    Lab Data:  CBC:   Recent Labs     07/24/19  0020 07/25/19  0355   WBC 6.7 5.1   RBC 4.34 4.24   HGB 12.1 11.7*   HCT 37.5 35.8*   MCV 86.4 84.4   RDW 14.8 14.7    203     BMP:   Recent Labs     07/25/19  0005 07/25/19  0355 07/25/19  0755    138 136   K 3.7 4.0 4.3    103 102   CO2 26 25 26   PHOS 3.0 3.1 2.6   BUN 13 13 13   CREATININE 0.6 0.6 0.6     BNP: No results for input(s): BNP in the last 72 hours. PT/INR: No results for input(s): PROTIME, INR in the last 72 hours. APTT:No results for input(s): APTT in the last 72 hours. CARDIAC ENZYMES:   Recent Labs     07/24/19  0300 07/24/19  0749 07/24/19  1141   TROPONINI <0.01 <0.01 <0.01     FASTING LIPID PANEL:  Lab Results   Component Value Date    CHOL 124 07/24/2019    HDL 24 07/24/2019    TRIG 333 07/24/2019     LIVER PROFILE:   Recent Labs     07/24/19  0020   AST 11*   ALT 14   BILITOT 0.4   ALKPHOS 154*      CXR    Improved lung volumes likely relating to better inspiratory effort. There is improved aeration to the lung bases with still some mild streaky  bibasilar airspace opacities, left more than right.  Findings may be on the  basis of atelectasis although multifocal pneumonia or aspiration pneumonitis  could have a similar appearance.     Assessment & Plan:     Patient Active Problem List    Diagnosis Date Noted    DM (diabetes mellitus) type 2, uncontrolled, with ketoacidosis ([address] 07/24/2019    Abnormal EKG     Coronary artery disease involving native coronary artery of native heart without angina pectoris     Diabetic ketoacidosis without coma associated with type 2 diabetes mellitus ([address]     Abnormal CXR     HTN (hypertension), benign 12/14/2016    DM (diabetes mellitus), secondary, uncontrolled, w/neurologic complic ([address] 35/08/1882    Trigeminal neuralgia of left side of face 12/14/2016    Uncontrolled diabetes mellitus ([address] 04/13/2016    Major depressive disorder,

## 2019-07-26 NOTE — DISCHARGE SUMMARY
UNIT/ML injection pen  Commonly known as:  NOVOLOG     Prodigy Autocode Blood Glucose w/Device Kit     promethazine 25 MG tablet  Commonly known as:  PHENERGAN     traZODone 150 MG tablet  Commonly known as:  DESYREL           Where to Get Your Medications      You can get these medications from any pharmacy    Bring a paper prescription for each of these medications  · HYDROcodone-acetaminophen 5-325 MG per tablet     Information about where to get these medications is not yet available    Ask your nurse or doctor about these medications  · gabapentin 400 MG capsule  · insulin glargine 100 UNIT/ML injection vial  · insulin lispro 100 UNIT/ML injection vial  · lisinopril 10 MG tablet       Discharged in stable condition to SNF    Follow Up: Follow up with physician at SNF      Total time spent on discharge is 35 minutes      DARLING Terrell.

## 2019-07-26 NOTE — PROGRESS NOTES
[facility] transport here to take patient to care core. Report given. Belongings packed and sent with patient. Pt leaving with no s/s distress noted.

## 2019-07-26 NOTE — DISCHARGE INSTR - COC
Continuity of Care Form    Patient Name: Shruti Strange   :  1955  MRN:  [id number]    Admit date:  2019  Discharge date:  2019    Code Status Order: Full Code   Advance Directives:     Admitting Physician:  Jeannie Mondragon MD  PCP: Florencia Watt DO    Discharging Nurse: Lynn Arceo [id number] Unit/Room#: /0623-27  Discharging Unit Phone Number: [phone]    Emergency Contact:   Extended Emergency Contact Information  Primary Emergency Contact: Quinn Avery   [address] Phone: [phone]  Relation: Child    Past Surgical History:  Past Surgical History:   Procedure Laterality Date    CHOLECYSTECTOMY         Immunization History: There is no immunization history on file for this patient.     Active Problems:  Patient Active Problem List   Diagnosis Code    Diabetes ([facility] E11.9    Hypertension I10    Hyperlipidemia E78.5    Depression F32.9    Insomnia G47.00    Neuropathy G62.9    Major depressive disorder, recurrent, in remission ([facility] [facility] F33.40    Uncontrolled diabetes mellitus ([address] E11.65    Bipolar affective disorder ([facility] [address] F31.9    HTN (hypertension), benign I10    DM (diabetes mellitus), secondary, uncontrolled, w/neurologic complic (HCC) N99.01, S49.46    Trigeminal neuralgia of left side of face G50.0    DM (diabetes mellitus) type 2, uncontrolled, with ketoacidosis ([address] E11.10    Abnormal EKG R94.31    Coronary artery disease involving native coronary artery of native heart without angina pectoris I25.10    Diabetic ketoacidosis without coma associated with type 2 diabetes mellitus (HCC) E11.10    Abnormal CXR R93.89       Isolation/Infection:   Isolation          No Isolation            Nurse Assessment:  Last Vital Signs: BP (!) 167/87   Pulse 110   Temp 97.4 °F (36.3 °C) (Oral)   Resp 16   Ht 5' 2\" (1.575 m)   Wt 167 lb 8.8 oz (76 kg)   SpO2 94%   BMI 30.65 kg/m²     Last documented pain score (0-10 scale): Pain Level:

## 2019-08-26 ENCOUNTER — TELEPHONE (OUTPATIENT)
Dept: FAMILY MEDICINE CLINIC | Age: 64
End: 2019-08-26

## 2020-11-03 PROBLEM — I10 HYPERTENSION: Status: RESOLVED | Noted: 2020-11-03 | Resolved: 2020-11-03

## 2020-11-19 ENCOUNTER — HOSPITAL ENCOUNTER (INPATIENT)
Age: 65
LOS: 12 days | Discharge: HOME HEALTH | DRG: 637 | End: 2020-12-01
Attending: EMERGENCY MEDICINE | Admitting: INTERNAL MEDICINE
Payer: MEDICARE

## 2020-11-19 ENCOUNTER — APPOINTMENT (OUTPATIENT)
Dept: GENERAL RADIOLOGY | Age: 65
DRG: 637 | End: 2020-11-19
Payer: MEDICARE

## 2020-11-19 PROBLEM — E11.10 DKA, TYPE 2, NOT AT GOAL (HCC): Status: ACTIVE | Noted: 2020-11-19

## 2020-11-19 LAB
A/G RATIO: 0.8 (ref 1.1–2.2)
ALBUMIN SERPL-MCNC: 3.6 G/DL (ref 3.4–5)
ALP BLD-CCNC: 130 U/L (ref 40–129)
ALT SERPL-CCNC: 25 U/L (ref 10–40)
AMORPHOUS: ABNORMAL /HPF
ANION GAP SERPL CALCULATED.3IONS-SCNC: 15 MMOL/L (ref 3–16)
AST SERPL-CCNC: 35 U/L (ref 15–37)
BACTERIA: ABNORMAL /HPF
BASE EXCESS VENOUS: -6.1 MMOL/L (ref -3–3)
BASOPHILS ABSOLUTE: 0 K/UL (ref 0–0.2)
BASOPHILS RELATIVE PERCENT: 0.6 %
BILIRUB SERPL-MCNC: 0.5 MG/DL (ref 0–1)
BILIRUBIN URINE: NEGATIVE
BLOOD, URINE: ABNORMAL
BUN BLDV-MCNC: 15 MG/DL (ref 7–20)
CALCIUM SERPL-MCNC: 9 MG/DL (ref 8.3–10.6)
CARBOXYHEMOGLOBIN: 1.9 % (ref 0–1.5)
CHLORIDE BLD-SCNC: 92 MMOL/L (ref 99–110)
CHP ED QC CHECK: YES
CHP ED QC CHECK: YES
CLARITY: ABNORMAL
CO2: 22 MMOL/L (ref 21–32)
COLOR: YELLOW
CREAT SERPL-MCNC: 0.7 MG/DL (ref 0.6–1.2)
D DIMER: 428 NG/ML DDU (ref 0–229)
EOSINOPHILS ABSOLUTE: 0 K/UL (ref 0–0.6)
EOSINOPHILS RELATIVE PERCENT: 0.2 %
EPITHELIAL CELLS, UA: ABNORMAL /HPF (ref 0–5)
GFR AFRICAN AMERICAN: >60
GFR NON-AFRICAN AMERICAN: >60
GLOBULIN: 4.4 G/DL
GLUCOSE BLD-MCNC: 273 MG/DL
GLUCOSE BLD-MCNC: 352 MG/DL
GLUCOSE BLD-MCNC: 352 MG/DL (ref 70–99)
GLUCOSE BLD-MCNC: 518 MG/DL (ref 70–99)
GLUCOSE URINE: >=1000 MG/DL
HCO3 VENOUS: 21 MMOL/L (ref 23–29)
HCT VFR BLD CALC: 39.1 % (ref 36–48)
HEMOGLOBIN: 12.7 G/DL (ref 12–16)
KETONES, URINE: 40 MG/DL
LACTIC ACID: 1.2 MMOL/L (ref 0.4–2)
LEUKOCYTE ESTERASE, URINE: ABNORMAL
LYMPHOCYTES ABSOLUTE: 1.1 K/UL (ref 1–5.1)
LYMPHOCYTES RELATIVE PERCENT: 13.1 %
MAGNESIUM: 2.1 MG/DL (ref 1.8–2.4)
MCH RBC QN AUTO: 28.1 PG (ref 26–34)
MCHC RBC AUTO-ENTMCNC: 32.5 G/DL (ref 31–36)
MCV RBC AUTO: 86.6 FL (ref 80–100)
METHEMOGLOBIN VENOUS: 0.5 %
MICROSCOPIC EXAMINATION: YES
MONOCYTES ABSOLUTE: 0.6 K/UL (ref 0–1.3)
MONOCYTES RELATIVE PERCENT: 6.6 %
NEUTROPHILS ABSOLUTE: 6.7 K/UL (ref 1.7–7.7)
NEUTROPHILS RELATIVE PERCENT: 79.5 %
NITRITE, URINE: POSITIVE
O2 CONTENT, VEN: 10 VOL %
O2 SAT, VEN: 54 %
O2 THERAPY: ABNORMAL
PCO2, VEN: 47.6 MMHG (ref 40–50)
PDW BLD-RTO: 15 % (ref 12.4–15.4)
PERFORMED ON: ABNORMAL
PH UA: 6.5 (ref 5–8)
PH VENOUS: 7.26 (ref 7.35–7.45)
PLATELET # BLD: 223 K/UL (ref 135–450)
PMV BLD AUTO: 8.7 FL (ref 5–10.5)
PO2, VEN: 31.8 MMHG (ref 25–40)
POTASSIUM REFLEX MAGNESIUM: 4.1 MMOL/L (ref 3.5–5.1)
PROTEIN UA: 100 MG/DL
RBC # BLD: 4.51 M/UL (ref 4–5.2)
RBC UA: ABNORMAL /HPF (ref 0–4)
SODIUM BLD-SCNC: 129 MMOL/L (ref 136–145)
SPECIFIC GRAVITY UA: 1.01 (ref 1–1.03)
TCO2 CALC VENOUS: 22 MMOL/L
TOTAL PROTEIN: 8 G/DL (ref 6.4–8.2)
TROPONIN: <0.01 NG/ML
URINE REFLEX TO CULTURE: YES
URINE TYPE: ABNORMAL
UROBILINOGEN, URINE: 0.2 E.U./DL
WBC # BLD: 8.4 K/UL (ref 4–11)
WBC UA: ABNORMAL /HPF (ref 0–5)

## 2020-11-19 PROCEDURE — 2000000000 HC ICU R&B

## 2020-11-19 PROCEDURE — 83735 ASSAY OF MAGNESIUM: CPT

## 2020-11-19 PROCEDURE — 85025 COMPLETE CBC W/AUTO DIFF WBC: CPT

## 2020-11-19 PROCEDURE — 36415 COLL VENOUS BLD VENIPUNCTURE: CPT

## 2020-11-19 PROCEDURE — 96361 HYDRATE IV INFUSION ADD-ON: CPT

## 2020-11-19 PROCEDURE — 6370000000 HC RX 637 (ALT 250 FOR IP): Performed by: EMERGENCY MEDICINE

## 2020-11-19 PROCEDURE — 71045 X-RAY EXAM CHEST 1 VIEW: CPT

## 2020-11-19 PROCEDURE — 83605 ASSAY OF LACTIC ACID: CPT

## 2020-11-19 PROCEDURE — 84484 ASSAY OF TROPONIN QUANT: CPT

## 2020-11-19 PROCEDURE — 93005 ELECTROCARDIOGRAM TRACING: CPT | Performed by: EMERGENCY MEDICINE

## 2020-11-19 PROCEDURE — 82803 BLOOD GASES ANY COMBINATION: CPT

## 2020-11-19 PROCEDURE — 2580000003 HC RX 258: Performed by: EMERGENCY MEDICINE

## 2020-11-19 PROCEDURE — 87040 BLOOD CULTURE FOR BACTERIA: CPT

## 2020-11-19 PROCEDURE — 80053 COMPREHEN METABOLIC PANEL: CPT

## 2020-11-19 PROCEDURE — 87086 URINE CULTURE/COLONY COUNT: CPT

## 2020-11-19 PROCEDURE — 96375 TX/PRO/DX INJ NEW DRUG ADDON: CPT

## 2020-11-19 PROCEDURE — 2580000003 HC RX 258: Performed by: NURSE PRACTITIONER

## 2020-11-19 PROCEDURE — 96374 THER/PROPH/DIAG INJ IV PUSH: CPT

## 2020-11-19 PROCEDURE — 85379 FIBRIN DEGRADATION QUANT: CPT

## 2020-11-19 PROCEDURE — 6360000002 HC RX W HCPCS: Performed by: EMERGENCY MEDICINE

## 2020-11-19 PROCEDURE — 99285 EMERGENCY DEPT VISIT HI MDM: CPT

## 2020-11-19 PROCEDURE — 81001 URINALYSIS AUTO W/SCOPE: CPT

## 2020-11-19 RX ORDER — 0.9 % SODIUM CHLORIDE 0.9 %
1000 INTRAVENOUS SOLUTION INTRAVENOUS ONCE
Status: COMPLETED | OUTPATIENT
Start: 2020-11-19 | End: 2020-11-19

## 2020-11-19 RX ORDER — ONDANSETRON 2 MG/ML
4 INJECTION INTRAMUSCULAR; INTRAVENOUS ONCE
Status: COMPLETED | OUTPATIENT
Start: 2020-11-19 | End: 2020-11-19

## 2020-11-19 RX ORDER — KETOROLAC TROMETHAMINE 30 MG/ML
15 INJECTION, SOLUTION INTRAMUSCULAR; INTRAVENOUS ONCE
Status: COMPLETED | OUTPATIENT
Start: 2020-11-19 | End: 2020-11-19

## 2020-11-19 RX ORDER — DEXTROSE MONOHYDRATE 25 G/50ML
12.5 INJECTION, SOLUTION INTRAVENOUS PRN
Status: DISCONTINUED | OUTPATIENT
Start: 2020-11-19 | End: 2020-12-01 | Stop reason: HOSPADM

## 2020-11-19 RX ORDER — NICOTINE POLACRILEX 4 MG
15 LOZENGE BUCCAL PRN
Status: DISCONTINUED | OUTPATIENT
Start: 2020-11-19 | End: 2020-12-01 | Stop reason: HOSPADM

## 2020-11-19 RX ORDER — DEXTROSE MONOHYDRATE 50 MG/ML
100 INJECTION, SOLUTION INTRAVENOUS PRN
Status: DISCONTINUED | OUTPATIENT
Start: 2020-11-19 | End: 2020-12-01 | Stop reason: HOSPADM

## 2020-11-19 RX ADMIN — KETOROLAC TROMETHAMINE 15 MG: 30 INJECTION, SOLUTION INTRAMUSCULAR at 21:34

## 2020-11-19 RX ADMIN — CEFTRIAXONE SODIUM 1 G: 1 INJECTION, POWDER, FOR SOLUTION INTRAMUSCULAR; INTRAVENOUS at 21:50

## 2020-11-19 RX ADMIN — SODIUM CHLORIDE 1000 ML: 9 INJECTION, SOLUTION INTRAVENOUS at 20:34

## 2020-11-19 RX ADMIN — ONDANSETRON 4 MG: 2 INJECTION INTRAMUSCULAR; INTRAVENOUS at 21:34

## 2020-11-19 RX ADMIN — SODIUM CHLORIDE 1000 ML: 9 INJECTION, SOLUTION INTRAVENOUS at 21:51

## 2020-11-19 RX ADMIN — SODIUM CHLORIDE 0.1 UNITS/KG/HR: 9 INJECTION, SOLUTION INTRAVENOUS at 23:00

## 2020-11-19 ASSESSMENT — PAIN SCALES - GENERAL
PAINLEVEL_OUTOF10: 8
PAINLEVEL_OUTOF10: 4
PAINLEVEL_OUTOF10: 8

## 2020-11-19 NOTE — ED NOTES
Bed: S01  Expected date:   Expected time:   Means of arrival:   Comments:  Tanya Carvalho RN  11/19/20 8216

## 2020-11-20 PROBLEM — U07.1 COVID-19: Status: ACTIVE | Noted: 2020-11-20

## 2020-11-20 LAB
ANION GAP SERPL CALCULATED.3IONS-SCNC: 10 MMOL/L (ref 3–16)
ANION GAP SERPL CALCULATED.3IONS-SCNC: 10 MMOL/L (ref 3–16)
BUN BLDV-MCNC: 8 MG/DL (ref 7–20)
BUN BLDV-MCNC: 9 MG/DL (ref 7–20)
CALCIUM SERPL-MCNC: 7.7 MG/DL (ref 8.3–10.6)
CALCIUM SERPL-MCNC: 8.4 MG/DL (ref 8.3–10.6)
CHLORIDE BLD-SCNC: 101 MMOL/L (ref 99–110)
CHLORIDE BLD-SCNC: 99 MMOL/L (ref 99–110)
CO2: 23 MMOL/L (ref 21–32)
CO2: 26 MMOL/L (ref 21–32)
CREAT SERPL-MCNC: <0.5 MG/DL (ref 0.6–1.2)
CREAT SERPL-MCNC: <0.5 MG/DL (ref 0.6–1.2)
EKG ATRIAL RATE: 113 BPM
EKG DIAGNOSIS: NORMAL
EKG P AXIS: 53 DEGREES
EKG P-R INTERVAL: 160 MS
EKG Q-T INTERVAL: 356 MS
EKG QRS DURATION: 80 MS
EKG QTC CALCULATION (BAZETT): 488 MS
EKG R AXIS: 49 DEGREES
EKG T AXIS: 34 DEGREES
EKG VENTRICULAR RATE: 113 BPM
GFR AFRICAN AMERICAN: >60
GFR AFRICAN AMERICAN: >60
GFR NON-AFRICAN AMERICAN: >60
GFR NON-AFRICAN AMERICAN: >60
GLUCOSE BLD-MCNC: 115 MG/DL (ref 70–99)
GLUCOSE BLD-MCNC: 129 MG/DL (ref 70–99)
GLUCOSE BLD-MCNC: 130 MG/DL (ref 70–99)
GLUCOSE BLD-MCNC: 248 MG/DL (ref 70–99)
GLUCOSE BLD-MCNC: 273 MG/DL (ref 70–99)
GLUCOSE BLD-MCNC: 280 MG/DL (ref 70–99)
GLUCOSE BLD-MCNC: 300 MG/DL (ref 70–99)
GLUCOSE BLD-MCNC: 307 MG/DL (ref 70–99)
GLUCOSE BLD-MCNC: 409 MG/DL (ref 70–99)
PERFORMED ON: ABNORMAL
POTASSIUM SERPL-SCNC: 3.4 MMOL/L (ref 3.5–5.1)
POTASSIUM SERPL-SCNC: 3.4 MMOL/L (ref 3.5–5.1)
SODIUM BLD-SCNC: 132 MMOL/L (ref 136–145)
SODIUM BLD-SCNC: 137 MMOL/L (ref 136–145)

## 2020-11-20 PROCEDURE — 93010 ELECTROCARDIOGRAM REPORT: CPT | Performed by: INTERNAL MEDICINE

## 2020-11-20 PROCEDURE — 80048 BASIC METABOLIC PNL TOTAL CA: CPT

## 2020-11-20 PROCEDURE — 2580000003 HC RX 258: Performed by: EMERGENCY MEDICINE

## 2020-11-20 PROCEDURE — 6370000000 HC RX 637 (ALT 250 FOR IP): Performed by: INTERNAL MEDICINE

## 2020-11-20 PROCEDURE — 2700000000 HC OXYGEN THERAPY PER DAY

## 2020-11-20 PROCEDURE — 36415 COLL VENOUS BLD VENIPUNCTURE: CPT

## 2020-11-20 PROCEDURE — 2500000003 HC RX 250 WO HCPCS: Performed by: INTERNAL MEDICINE

## 2020-11-20 PROCEDURE — 94761 N-INVAS EAR/PLS OXIMETRY MLT: CPT

## 2020-11-20 PROCEDURE — 1200000000 HC SEMI PRIVATE

## 2020-11-20 RX ORDER — FLUOXETINE HYDROCHLORIDE 20 MG/1
20 CAPSULE ORAL DAILY
Status: DISCONTINUED | OUTPATIENT
Start: 2020-11-20 | End: 2020-12-01 | Stop reason: HOSPADM

## 2020-11-20 RX ORDER — INSULIN GLARGINE 100 [IU]/ML
10 INJECTION, SOLUTION SUBCUTANEOUS NIGHTLY
Status: DISCONTINUED | OUTPATIENT
Start: 2020-11-20 | End: 2020-11-21

## 2020-11-20 RX ORDER — LABETALOL HYDROCHLORIDE 5 MG/ML
10 INJECTION, SOLUTION INTRAVENOUS EVERY 4 HOURS PRN
Status: DISCONTINUED | OUTPATIENT
Start: 2020-11-20 | End: 2020-12-01 | Stop reason: HOSPADM

## 2020-11-20 RX ORDER — VALPROIC ACID 250 MG/1
250 CAPSULE, LIQUID FILLED ORAL ONCE
Status: COMPLETED | OUTPATIENT
Start: 2020-11-20 | End: 2020-11-20

## 2020-11-20 RX ORDER — ONDANSETRON 2 MG/ML
4 INJECTION INTRAMUSCULAR; INTRAVENOUS EVERY 6 HOURS PRN
Status: DISCONTINUED | OUTPATIENT
Start: 2020-11-20 | End: 2020-12-01 | Stop reason: HOSPADM

## 2020-11-20 RX ORDER — DIVALPROEX SODIUM 250 MG/1
250 TABLET, DELAYED RELEASE ORAL 2 TIMES DAILY
Status: DISCONTINUED | OUTPATIENT
Start: 2020-11-20 | End: 2020-12-01 | Stop reason: HOSPADM

## 2020-11-20 RX ORDER — LABETALOL HYDROCHLORIDE 5 MG/ML
10 INJECTION, SOLUTION INTRAVENOUS ONCE
Status: COMPLETED | OUTPATIENT
Start: 2020-11-20 | End: 2020-11-20

## 2020-11-20 RX ORDER — QUETIAPINE FUMARATE 100 MG/1
300 TABLET, FILM COATED ORAL ONCE
Status: COMPLETED | OUTPATIENT
Start: 2020-11-20 | End: 2020-11-20

## 2020-11-20 RX ORDER — QUETIAPINE FUMARATE 100 MG/1
300 TABLET, FILM COATED ORAL NIGHTLY
Status: DISCONTINUED | OUTPATIENT
Start: 2020-11-20 | End: 2020-12-01 | Stop reason: HOSPADM

## 2020-11-20 RX ORDER — ASPIRIN 325 MG
325 TABLET ORAL DAILY
Status: DISCONTINUED | OUTPATIENT
Start: 2020-11-20 | End: 2020-12-01 | Stop reason: HOSPADM

## 2020-11-20 RX ORDER — ATORVASTATIN CALCIUM 40 MG/1
40 TABLET, FILM COATED ORAL DAILY
Status: DISCONTINUED | OUTPATIENT
Start: 2020-11-20 | End: 2020-12-01 | Stop reason: HOSPADM

## 2020-11-20 RX ORDER — PANTOPRAZOLE SODIUM 40 MG/1
40 TABLET, DELAYED RELEASE ORAL
Status: DISCONTINUED | OUTPATIENT
Start: 2020-11-20 | End: 2020-12-01 | Stop reason: HOSPADM

## 2020-11-20 RX ORDER — ATORVASTATIN CALCIUM 40 MG/1
40 TABLET, FILM COATED ORAL DAILY
COMMUNITY

## 2020-11-20 RX ORDER — DIVALPROEX SODIUM 250 MG/1
250 TABLET, DELAYED RELEASE ORAL 2 TIMES DAILY
COMMUNITY

## 2020-11-20 RX ORDER — BUSPIRONE HYDROCHLORIDE 5 MG/1
10 TABLET ORAL 3 TIMES DAILY
Status: DISCONTINUED | OUTPATIENT
Start: 2020-11-20 | End: 2020-12-01 | Stop reason: HOSPADM

## 2020-11-20 RX ORDER — GABAPENTIN 400 MG/1
400 CAPSULE ORAL 3 TIMES DAILY
Status: DISCONTINUED | OUTPATIENT
Start: 2020-11-20 | End: 2020-12-01 | Stop reason: HOSPADM

## 2020-11-20 RX ADMIN — BUSPIRONE HYDROCHLORIDE 10 MG: 5 TABLET ORAL at 21:45

## 2020-11-20 RX ADMIN — GABAPENTIN 400 MG: 400 CAPSULE ORAL at 21:45

## 2020-11-20 RX ADMIN — GABAPENTIN 400 MG: 400 CAPSULE ORAL at 09:40

## 2020-11-20 RX ADMIN — INSULIN LISPRO 4 UNITS: 100 INJECTION, SOLUTION INTRAVENOUS; SUBCUTANEOUS at 09:43

## 2020-11-20 RX ADMIN — FLUOXETINE 20 MG: 20 CAPSULE ORAL at 09:40

## 2020-11-20 RX ADMIN — DEXTROSE MONOHYDRATE 100 ML/HR: 50 INJECTION, SOLUTION INTRAVENOUS at 02:00

## 2020-11-20 RX ADMIN — QUETIAPINE FUMARATE 300 MG: 100 TABLET ORAL at 03:50

## 2020-11-20 RX ADMIN — INSULIN LISPRO 3 UNITS: 100 INJECTION, SOLUTION INTRAVENOUS; SUBCUTANEOUS at 19:50

## 2020-11-20 RX ADMIN — QUETIAPINE FUMARATE 300 MG: 100 TABLET ORAL at 21:44

## 2020-11-20 RX ADMIN — ATORVASTATIN CALCIUM 40 MG: 40 TABLET, FILM COATED ORAL at 09:40

## 2020-11-20 RX ADMIN — INSULIN GLARGINE 10 UNITS: 100 INJECTION, SOLUTION SUBCUTANEOUS at 04:59

## 2020-11-20 RX ADMIN — BUSPIRONE HYDROCHLORIDE 10 MG: 5 TABLET ORAL at 17:32

## 2020-11-20 RX ADMIN — VALPROIC ACID 250 MG: 250 CAPSULE, LIQUID FILLED ORAL at 04:17

## 2020-11-20 RX ADMIN — INSULIN LISPRO 4 UNITS: 100 INJECTION, SOLUTION INTRAVENOUS; SUBCUTANEOUS at 18:48

## 2020-11-20 RX ADMIN — INSULIN LISPRO 2 UNITS: 100 INJECTION, SOLUTION INTRAVENOUS; SUBCUTANEOUS at 12:24

## 2020-11-20 RX ADMIN — DIVALPROEX SODIUM 250 MG: 250 TABLET, DELAYED RELEASE ORAL at 09:40

## 2020-11-20 RX ADMIN — PANTOPRAZOLE SODIUM 40 MG: 40 TABLET, DELAYED RELEASE ORAL at 09:40

## 2020-11-20 RX ADMIN — LABETALOL HYDROCHLORIDE 10 MG: 5 INJECTION INTRAVENOUS at 04:17

## 2020-11-20 RX ADMIN — ASPIRIN 325 MG ORAL TABLET 325 MG: 325 PILL ORAL at 09:40

## 2020-11-20 RX ADMIN — BUSPIRONE HYDROCHLORIDE 10 MG: 5 TABLET ORAL at 09:40

## 2020-11-20 RX ADMIN — GABAPENTIN 400 MG: 400 CAPSULE ORAL at 17:32

## 2020-11-20 RX ADMIN — DIVALPROEX SODIUM 250 MG: 250 TABLET, DELAYED RELEASE ORAL at 21:45

## 2020-11-20 ASSESSMENT — PAIN SCALES - GENERAL: PAINLEVEL_OUTOF10: 0

## 2020-11-20 NOTE — ACP (ADVANCE CARE PLANNING)
Advance Care Planning   Advance Care Planning Clinical Specialist  Conversation Note      Date of ACP Conversation: 11/20/2020    Conversation Conducted with:   Patient with Decision Making Capacity   ACP Clinical Specialist: Izabella Fournier RN         Current Designated Health Care Decision Maker:   Primary Decision Maker: Kip Pedro - [phone]  (as entered in [address] file    Patient has no Advance Directives but verbally designates son Vera Li as her decision maker  [address]   Who do you trust to make healthcare decisions for you? Name:  Vera Li  Phone  Number: [phone]  Can this person be reached and be able to respond quickly, such as within a few minutes or hours? No    Who would be your back-up decision maker? Name none  Phone Numbercnone       Hospitalization  If your health were to worsen and it became clear that your chance of recovery was unlikely, what would your preferences be regarding hospitalization?:    Choice:  [x]  The patient would want hospitalization  []  The patient would prefer comfort-focused treatment without hospitalization. Ventilation  If you were in your present state of health and suddenly became very ill and were unable to breathe on your own, what would your preference be about the use of a ventilator (breathing machine) if it were available to you? If patient would desire the use of a ventilator (breathing machine), answer \"yes\", if not \"no\":yes    If your health were to worsen and it became clear that your chance of recovery was unlikely, would that change your answer? Yes    Resuscitation  CPR works best to restart the heart when there is a sudden event, like a heart attack, in someone who is otherwise healthy. Unfortunately, CPR does not typically restart the heart for people who have serious health conditions or who are very sick.     In the event your heart stopped, would you want attempts to restart your heart (answer \"yes\") or would you prefer a natural death (answer \"no\")? yes    If your health were to worsen and it became clear that your chance of recovery was unlikely, would that change your answer? Yes    [] Yes  [] No   Educated Patient / [employer] Leys regarding differences between Advance Directives and portable DNR orders. Length of ACP Conversation in minutes: 20 minutes    Conversation Outcomes:  [x] ACP discussion completed  [] Existing advance directive reviewed with patient; no changes to patient's previously recorded wishes   [] New Advance Directive completed   [] Portable Do Not Rescitate prepared for Provider review and signature  [] POLST/POST/MOLST/MOST prepared for Provider review and signature      Follow-up plan:    [] Schedule follow-up conversation to continue planning  [] Referred individual to Provider for additional questions/concerns   [] Advised patient/agent/surrogate to review completed ACP document and update if needed with changes in condition, patient preferences or care setting     [] This note routed to one or more involved healthcare providers     Patient has no Advance Directives and is not interested in completing at this time. She is comfortable with her son [address] [place] [address] making health care decisions if she is unable.       Lul Berkowitz RN  ACP Clinical Specialist

## 2020-11-20 NOTE — ED PROVIDER NOTES
Evaluated by Advanced Practice Provider    EMERGENCY DEPARTMENT ENCOUNTER      CHIEFCOMPLAINT  Pneumonia (Pt is covid positive. Positive test 3-4 days ago. General body aches, weakness, and cough.)    VICKIE Huynh is a 63 y.o. female who presents to the emergency department with complaints of shortness of breath. She was swabbed for COVID and was positive 3 days ago. She does not know the first day of when her symptoms started. States maybe to 4-5 days of symptoms. She is having dizzy spells and trouble breathing. Lives with family, son is +COVID, works at a nursing home. When she gets up to go to the bathroom she falls, says she has fallen about 20 times. She also reports she has body aches all over and having pain in her stomach. She reports nausea, vomiting. She hurts all over, her stomach cramps her to death. Even her finger nails hurt. She does not wear oxygen at home. They put her on oxygen in the ambulance. She does not know if she has had any fevers, she can't read her thermometer. She has been having chills and feeling hot. She took tylenol a couple hours before arriving in the ER. She has never been a smoker. Denies history of COPD, emphysema, asthma. She had her COVID test at [facility] and a CXR on 11/17 that shows PNA. Thepatient is currently rating their pain as 8/10 and describes it as an aching type of pain. Treatments tried prior to arrival in the ED include: above. The patient denies other complaints, modifying factors orassociated symptoms. The patient arrived to the ED via EMS transport.     PAST MEDICAL HISTORY    Past Medical History:   Diagnosis Date    Bipolar affective disorder ([facility] 9/28/2015    Coronary artery disease involving native coronary artery of native heart without angina pectoris     Depression     Diabetes ([facility] [facility]     Hyperlipidemia     Hypertension     Insomnia     Neuropathy        SURGICAL HISTORY    Past Surgical History:   Procedure Laterality Date    CHOLECYSTECTOMY         CURRENT MEDICATIONS    Current Outpatient Rx   Medication Sig Dispense Refill    gabapentin (NEURONTIN) 400 MG capsule Take 1 capsule by mouth 3 times daily for 30 days. 90 capsule 0    insulin glargine (LANTUS) 100 UNIT/ML injection vial Inject 30 Units into the skin 2 times daily 1 vial 0    insulin lispro (HUMALOG) 100 UNIT/ML injection vial Inject 16 Units into the skin 3 times daily (with meals) 1 vial 0    lisinopril (PRINIVIL;ZESTRIL) 10 MG tablet Take 1 tablet by mouth daily 30 tablet 0    busPIRone (BUSPAR) 10 MG tablet Take 10 mg by mouth 3 times daily      QUEtiapine (SEROQUEL) 300 MG tablet TAKE ONE TABLET BY MOUTH ONCE NIGHTLY 90 tablet 2    pravastatin (PRAVACHOL) 40 MG tablet TAKE ONE TABLET BY MOUTH DAILY 90 tablet 2    omeprazole (PRILOSEC) 20 MG delayed release capsule Take 1 capsule by mouth Daily 90 capsule 2    FLUoxetine (PROZAC) 20 MG tablet Take 1 tablet by mouth daily 90 tablet 5    carBAMazepine (TEGRETOL) 200 MG tablet Take 1 tablet by mouth 3 times daily 30 tablet 1    glucose blood VI test strips (EVA CONTOUR TEST) strip 1 each by In Vitro route daily As needed. 100 each 0    Insulin Pen Needle 32G X 4 MM MISC 4 times a day 150 each 3    aspirin 325 MG tablet Take 1 tablet by mouth daily 30 tablet 5       ALLERGIES    Allergies   Allergen Reactions    Iv Contrast [Iodides]     Pcn [Penicillins] Shortness Of Breath       FAMILY HISTORY    Family History   Problem Relation Age of Onset    Heart Disease Mother     Heart Disease Father        SOCIAL HISTORY    Social History     Socioeconomic History    Marital status:       Spouse name: None    Number of children: None    Years of education: None    Highest education level: None   Occupational History    None   Social Needs    Financial resource strain: None    Food insecurity     Worry: None     Inability: None    Transportation needs     Medical: None Non-medical: None   Tobacco Use    Smoking status: Never Smoker    Smokeless tobacco: Never Used   Substance and Sexual Activity    Alcohol use: No    Drug use: No    Sexual activity: Not Currently   Lifestyle    Physical activity     Days per week: None     Minutes per session: None    Stress: None   Relationships    Social connections     Talks on phone: None     Gets together: None     Attends Gnosticist service: None     Active member of club or organization: None     Attends meetings of clubs or organizations: None     Relationship status: None    Intimate partner violence     Fear of current or ex partner: None     Emotionally abused: None     Physically abused: None     Forced sexual activity: None   Other Topics Concern    None   Social History Narrative    None       REVIEW OF SYSTEMS    10 systems reviewed, pertinent positives per HPI otherwise noted to be negative    PHYSICAL EXAM  Physical Exam  Vitals:    11/19/20 2057   BP: (!) 177/77   Pulse: 113   Resp: 13   Temp:    SpO2: 94%       GENERAL: Patient is elderly thin and frail appearing. Awake and alert. Cooperative. Resting in bed. No apparent distress. HEENT:  Normocephalic, atraumatic. Conjunctiva appear normal. Sclera is non-icteric. External ears are normal.    NECK: Supple with normal ROM. Trachea midline  LUNGS: Equal and symmetric chest rise. Breathing is unlabored. Speaking comfortably in full sentences. Lungs are clear bilaterally to auscultation. Without wheezing, rales, or rhonchi. CADIOVASCULAR: Tachycardic rate and rhythm. Normal S1-S2 sounds. No murmurs, rubs, or gallops. Capillary refill is brisk in all 4extremities. Bilateral lower extremities are equal in size, there is no swelling observed. There is no tenderness to palpation. There is no erythema observed or warmth palpated. GI: Soft, nontender, nondistended with positive bowelsounds. No rebound tenderness, guarding or any peritoneal signs.   No masses or hepatosplenomegaly   MUSCULOSKELETAL:  No gross deformities or trauma noted. Moving allextremities equally and appropriately. Normal ROM. SKIN: Warm/dry. Skin is intact. Norashes/lesions noted. PSYCHIATRIC: Mood and affect appropriate. Speech is clear andarticulate. NEUROLOGIC: Alert and oriented. No focal motor or sensory deficits. LABS  I havereviewed all labs for this visit.    Results for orders placed or performed during the hospital encounter of 11/19/20   CBC Auto Differential   Result Value Ref Range    WBC 8.4 4.0 - 11.0 K/uL    RBC 4.51 4.00 - 5.20 M/uL    Hemoglobin 12.7 12.0 - 16.0 g/dL    Hematocrit 39.1 36.0 - 48.0 %    MCV 86.6 80.0 - 100.0 fL    MCH 28.1 26.0 - 34.0 pg    MCHC 32.5 31.0 - 36.0 g/dL    RDW 15.0 12.4 - 15.4 %    Platelets 319 306 - 649 K/uL    MPV 8.7 5.0 - 10.5 fL    Neutrophils % 79.5 %    Lymphocytes % 13.1 %    Monocytes % 6.6 %    Eosinophils % 0.2 %    Basophils % 0.6 %    Neutrophils Absolute 6.7 1.7 - 7.7 K/uL    Lymphocytes Absolute 1.1 1.0 - 5.1 K/uL    Monocytes Absolute 0.6 0.0 - 1.3 K/uL    Eosinophils Absolute 0.0 0.0 - 0.6 K/uL    Basophils Absolute 0.0 0.0 - 0.2 K/uL   Comprehensive Metabolic Panel w/ Reflex to MG   Result Value Ref Range    Sodium 129 (L) 136 - 145 mmol/L    Potassium reflex Magnesium 4.1 3.5 - 5.1 mmol/L    Chloride 92 (L) 99 - 110 mmol/L    CO2 22 21 - 32 mmol/L    Anion Gap 15 3 - 16    Glucose 518 (H) 70 - 99 mg/dL    BUN 15 7 - 20 mg/dL    CREATININE 0.7 0.6 - 1.2 mg/dL    GFR Non-African American >60 >60    GFR African American >60 >60    Calcium 9.0 8.3 - 10.6 mg/dL    Total Protein 8.0 6.4 - 8.2 g/dL    Alb 3.6 3.4 - 5.0 g/dL    Albumin/Globulin Ratio 0.8 (L) 1.1 - 2.2    Total Bilirubin 0.5 0.0 - 1.0 mg/dL    Alkaline Phosphatase 130 (H) 40 - 129 U/L    ALT 25 10 - 40 U/L    AST 35 15 - 37 U/L    Globulin 4.4 g/dL   Troponin   Result Value Ref Range    Troponin <0.01 <0.01 ng/mL   Lactic Acid, Plasma   Result Value Ref Range    Lactic Subtle increased bibasilar lung opacities are nonspecific and may represent atelectasis versus pneumonia. RECORDS REVIEW  Care Everywhere Result Report  Symptomatic COVID-19 TestResulted: 11/10/2020 12:47 PM  [facility]   Component Name Value Ref Range   CORONAVIRUS SOURCE Nasopharynx     COVID19 INDICATION ADMISSION TO COVID UNIT     CORONAVIRUS COVID19 POSITIVE (AA)   Comment:  Positive results are indicative of the presence of SARS-CoV-2 RNA. Clinical correlation with patient history and other diagnostic information is necessary to determine patient infection status. Testing was performed on the [employer] utilizing [employer] SARS-CoV-2 Real Time RT PCR technology. This assay is distributed under an Emergency Use Authorization granted by the FDA for the qualitative detection of the SARS-CoV-2 nucleic acid. Fact Sheet for Healthcare providers: http://mGaadi.Triton/  Fact Sheet for patients: http://mGaadi.Triton/  *CRITICAL VALUE*  Infection Prevention Alert  Tested at [facility] [address] [facility]     Specimen Collected on   Swab - Nasopharyngeal 11/9/2020 6:06 PM     XR CHEST AP [id number]/17/2020  [facility]   Result Impression       There are some patchy new basilar lung opacities which could reflect either atelectasis or minimal early pneumonia. Result Narrative   HISTORY:   Chest pain nausea, chest pain  COMPARISON: 11/9/2020  NOTE:  If there are questions about the content of this report, please contact [address] [place] [address] radiology by calling [phone]    FINDINGS:  LINES/DEVICES: None  LUNGS/PLEURA:  There are patchy bibasilar lung opacities which are new since comparison. Calcified right midlung granuloma is otherwise unchanged.   HEART:  Unremarkable  MEDIASTINUM/JAYLAN:  Unremarkable  BONES:  Unremarkable  OTHER:  None     Other Result Information   Interface, Rad Results In - 11/17/2020  6:38 PM EST  HISTORY:   Chest pain drip.     Chest x-ray does show subtle increased bibasilar lung opacities that are nonspecific and may represent atelectasis versus pneumonia. Patient does have a diagnosis of Covid and this is likely a pneumonia related to Covid. Pt was given the following medications ortreatments in the ED:   Medications   0.9 % sodium chloride bolus (1,000 mLs Intravenous New Bag 11/19/20 2151)   cefTRIAXone (ROCEPHIN) 1 g IVPB in 50 mL D5W minibag (1 g Intravenous New Bag 11/19/20 2150)   glucose (GLUTOSE) 40 % oral gel 15 g (has no administration in time range)   dextrose 50 % IV solution (has no administration in time range)   glucagon (rDNA) injection 1 mg (has no administration in time range)   dextrose 5 % solution (has no administration in time range)   insulin regular (HUMULIN R;NOVOLIN R) 100 Units in sodium chloride 0.9 % 100 mL infusion (has no administration in time range)   0.9 % sodium chloride bolus (1,000 mLs Intravenous New Bag 11/19/20 2034)   ondansetron (ZOFRAN) injection 4 mg (4 mg Intravenous Given 11/19/20 2134)   ketorolac (TORADOL) injection 15 mg (15 mg Intravenous Given 11/19/20 2134)     Patient will require admission for further evaluation and management of her multiple medical conditions. These do include Covid pneumonia and DKA. I spoke with Dr. Christian Barboza. We thoroughly discussed the history, physical exam, laboratory and imaging studies, as well as, emergency department course. Based upon that discussion, we've decided to admit Rajwinder Gutierrez for further observation and evaluation of Radha Love mental status change. As I have deemed necessary from their history, physical and studies, I have considered and evaluated Rajwinder Gutierrez for the following diagnoses:  DKA, INFECTION, SEPSIS, PNA, PE, ACS, ELECTROLYTE ABNORMALITY, ANEMIA, DARIANA, LIVER DYSFUNCTION, UTI. The total critical care time spent while evaluating and treating this patient was 41 minutes.   This excludes time spent doing separately

## 2020-11-20 NOTE — PROGRESS NOTES
Comprehensive Nutrition Assessment    Type and Reason for Visit:  Initial, Positive Nutrition Screen    Nutrition Recommendations/Plan:   1. Recommend General diet order, free of therapeutic restrictions, to promote adequate nutrient intake  2. Trial high protein ONS BID  3. Encourage nutrition  4. Monitor nutrition adequacy, pertinent labs, bowel habits, wt changes, and clinical progress    Nutrition Assessment:  Positive nutrition screen for MST 2. Pt admitted with SOB and weakness r/t recent COVID-19 infection. She remains in droplet plus isolation. Eating poorly PTA 2/2 feeling ill. Ordered on general diet now. No intakes documented yet. Will trial high protein ONS and monitor nutrition adequacy and tolerance. Malnutrition Assessment:  Malnutrition Status: At risk for malnutrition (Comment)    Context:  Acute Illness       Estimated Daily Nutrient Needs:  Energy (kcal):  4048-0364; Weight Used for Energy Requirements:  Current(63 kg)   Protein (g):  76-88 g; Weight Used for Protein Requirements:  Current(1.2-1.4)        Fluid (ml/day):   ; Method Used for Fluid Requirements:  1 ml/kcal      Nutrition Related Findings:  Active bowel sounds      Wounds:  None       Current Nutrition Therapies:    DIET GENERAL; Anthropometric Measures:  · Height: 5' 2\" (157.5 cm)  · Current Body Weight: 138 lb (62.6 kg)   · Ideal Body Weight: 110 lbs  · BMI: 25.2  · BMI Categories: Overweight (BMI 25.0-29. 9)       Nutrition Diagnosis:   · Inadequate oral intake related to pain, early satiety as evidenced by poor intake prior to admission      Nutrition Interventions:   Food and/or Nutrient Delivery:  Start Oral Diet, Start Oral Nutrition Supplement  Coordination of Nutrition Care:  Continue to monitor while inpatient    Goals:   Tolerate most appropriate form of nutrition therapy this admission       Nutrition Monitoring and Evaluation:   Behavioral-Environmental Outcomes:  Beliefs and Attitutes   Food/Nutrient Intake Outcomes:  Diet Advancement/Tolerance, Food and Nutrient Intake, Supplement Intake  Physical Signs/Symptoms Outcomes:  Biochemical Data     Discharge Planning:    Continue Oral Nutrition Supplement, Continue current diet     Electronically signed by Sonia Hernandez.  Beatrice Snow RD, LD on 11/20/20 at 12:43 PM EST    Contact: [phone]

## 2020-11-20 NOTE — H&P
Hospital Medicine History & Physical      PCP: Viviane Tavarez DO, DO    Date of Admission: 11/19/2020    Date of Service: Pt seen/examined on 11/20/2020  Pt seen/examined face to face on and admitted as inpatient with expected LOS greater than two midnights due to medical therapy  a  Chief Complaint:    Chief Complaint   Patient presents with    Pneumonia     Pt is covid positive. Positive test 3-4 days ago. General body aches, weakness, and cough. History Of Present Illness:      63 y.o. female who presented to [facility] with past medical history of depression, type 2 diabetes, hypertension, hyperlipidemia, CAD, bipolar affective disorder, seizures on Depakote reported that she came in here for nausea vomiting low oral intake. Patient reported that she does live at home and is not homeless but has not been taking any of her medications for few weeks does live with her son. Patient reports that for the past 4 5 days has been having nausea vomiting multiple times with weakness generalized and abdominal pain that is mostly epigastric. Patient also reported dyspneawith no known alleviating factors, exacerbated on exertion. Patient also reports that she has been tested positive with Covid 3 days ago due to living with her son who works at the nursing home, patient also has been reporting presyncopal episodes on exertion. [place] [facility] [place] on 11/17 that shows PNA.      In the ED patient was started on insulin drip per DKA protocol given anion gap hyperglycemia and ketones with acidosis  Past Medical History:          Diagnosis Date    Bipolar affective disorder ([facility] [address] 9/28/2015    Coronary artery disease involving native coronary artery of native heart without angina pectoris     Depression     Diabetes ([address]     Hyperlipidemia     Hypertension     Insomnia     Neuropathy        Past Surgical History:          Procedure Laterality Date    Disease Mother     Heart Disease Father        REVIEW OF SYSTEMS:     Constitutional:  No Fever, No Chills, No Night Sweats  ENT/Mouth:  No Nasal Congestion,  No Hoarseness, No new mouth lesion  Eyes:  No Eye Pain, No Redness, No Discharge  Cardiovascular:  No Chest Pain, No Orthopnea, No Palpitations  Respiratory:  No Cough, No Sputum, No Dyspnea  Gastrointestinal:  No Nausea, No Vomiting, No Diarrhea,   Genitourinary: No Urinary Frequency, No Hematuria, No Urinary pain  Musculoskeletal:  No worsening Arthralgias, No worsening Myalgias  Skin:  No new Skin Lesions, No new skin rash  Neuro:  No new weakness, No new numbness. Psych:  No suicial ideation, No Violence ideation    PHYSICAL EXAM PERFORMED:    BP (!) 180/88   Pulse 109   Temp 98.9 °F (37.2 °C) (Oral)   Resp 15   Ht 5' 2\" (1.575 m)   Wt 138 lb 14.2 oz (63 kg)   SpO2 95%   BMI 25.40 kg/m²     General appearance:  mild acute distress, appears older than stated age  HEENT:   atraumatic, sclera anicteric, Conjunctivae clear. Neck: Supple,Trachea midline, no goiter  Respiratory:  Normal respiratory effort. Clear to auscultation, bilaterally without Rales/Wheezes/Rhonchi. Cardiovascular:  Regular rate and rhythm without murmurs, capillary refill 2 seconds  Abdomen: Soft, non-tender, non-distended with normal bowel sounds. Musculoskeletal:  No clubbing, cyanosis or edema bilaterally. Skin: turgor normal.  No new rashes or lesions. Neurologic: Alert and oriented x4, no new focal sensory/motor deficits. Labs:     Recent Labs     11/19/20 1920   WBC 8.4   HGB 12.7   HCT 39.1        Recent Labs     11/19/20 1920 11/20/20  0413   * 137   K 4.1 3.4*   CL 92* 101   CO2 22 26   BUN 15 9   CREATININE 0.7 <0.5*   CALCIUM 9.0 8.4     Recent Labs     11/19/20 1920   AST 35   ALT 25   BILITOT 0.5   ALKPHOS 130*     No results for input(s): INR in the last 72 hours.   Recent Labs     11/19/20 1920   TROPONINI <0.01       Urinalysis:      Lab Results   Component Value Date    NITRU POSITIVE 11/19/2020    WBCUA  11/19/2020    BACTERIA 1+ 11/19/2020    RBCUA 21-50 11/19/2020    BLOODU SMALL 11/19/2020    SPECGRAV 1.015 11/19/2020    GLUCOSEU >=1000 11/19/2020       Radiology:     CXR: I have reviewed the CXR with the following interpretation:   Increased lung opacity  EKG:  I have reviewed the EKG with the following interpretation:   Sinus tachycardia     XR CHEST PORTABLE   Final Result   Subtle increased bibasilar lung opacities are nonspecific and may represent   atelectasis versus pneumonia. ASSESSMENT AND PLAN:    Active Hospital Problems    Diagnosis Date Noted    DKA, type 2, not at goal [facility]) [E11.10] 11/19/2020     1. DKA:  Resolved after insulin drip  Transition to IV long-acting  ISS    2. Covid positive pneumonia:  Supportive care  Currently not requiring oxygen thus holding given steroids  Continue to monitor    3. Hypertensive urgency:  Was on lisinopril however was DC'd. Await med reconciliation  Labetalol, defer to primary to start home med    4. Hypokalemia:  Replace    Current medical conditions:  CAD: Continue aspirin  Hyperlipidemia: Continue medication  Seizure: Continue medication  Bipolar: Continue medication  Insomnia: Continue medication    Diet: NPO except meds ordered    PT/OT Eval Status: consulted    DVT Prophylaxis: hled    Dispo:   Expected LOS greater than two [place], DO

## 2020-11-20 NOTE — ED NOTES
2153 Hospitalist COVID+ PNA, DKA, UTI  Dr. Stephenson Books called back @ [phone] [facility]  11/19/20 3176

## 2020-11-20 NOTE — ED PROVIDER NOTES
Emergency Department Provider Note     Location: [facility]  ED  11/19/2020    I independently performed a history and physical on Lesly Thrasher.   All diagnostic, treatment, and disposition decisions were made by myself in conjunction with the mid-level provider. Briefly, this is a 63 y.o. female here for body aches, positive covid test at [address] 3-4 days ago, blood sugar and BP running high, has been taking meds and insulin. ED Triage Vitals [11/19/20 1755]   BP Temp Temp Source Pulse Resp SpO2 Height Weight   (!) 142/79 99.1 °F (37.3 °C) Oral 114 20 92 % 5' 2\" (1.575 m) 145 lb (65.8 kg)      Exam:  Patient is ill-appearing, appears fatigued, bibasilar crackles, coarse hacking cough, no wheezing, occasional rhonchi, no abdominal tenderness, heart tachycardic with regular rhythm, she is sitting up on the edge of the bed, speaks in full sentences, no acute distress  Patient seen and examined. EKG  The Ekg interpreted by me in the absence of a cardiologist shows. Sinus tachycardia, rate 113, normal axis, , borderline prolonged, no ST segment or T wave changes indicative of acute ischemia, faster rate than July 24, 2019 EKG by comparison    Xr Chest Portable    Result Date: 11/19/2020  EXAMINATION: ONE XRAY VIEW OF THE CHEST 11/19/2020 7:11 pm COMPARISON: July 24, 2019 HISTORY: ORDERING SYSTEM PROVIDED HISTORY: PNA on CXR at [facility] on 11/17, symptoms worsening +COVID TECHNOLOGIST PROVIDED HISTORY: Reason for exam:->PNA on CXR at [facility] on 11/17, symptoms worsening +COVID Reason for Exam: covid Acuity: Acute Type of Exam: Initial FINDINGS: Right upper lobe calcified granuloma. Subtle increased bibasilar lung opacities. Cardiomegaly. No pulmonary edema. Subtle increased bibasilar lung opacities are nonspecific and may represent atelectasis versus pneumonia.        Results for orders placed or performed during the hospital encounter of 11/19/20   CBC Auto Differential   Result Value Ref Range    WBC 8.4 4.0 - 11.0 K/uL    RBC 4.51 4.00 - 5.20 M/uL    Hemoglobin 12.7 12.0 - 16.0 g/dL    Hematocrit 39.1 36.0 - 48.0 %    MCV 86.6 80.0 - 100.0 fL    MCH 28.1 26.0 - 34.0 pg    MCHC 32.5 31.0 - 36.0 g/dL    RDW 15.0 12.4 - 15.4 %    Platelets 239 796 - 361 K/uL    MPV 8.7 5.0 - 10.5 fL    Neutrophils % 79.5 %    Lymphocytes % 13.1 %    Monocytes % 6.6 %    Eosinophils % 0.2 %    Basophils % 0.6 %    Neutrophils Absolute 6.7 1.7 - 7.7 K/uL    Lymphocytes Absolute 1.1 1.0 - 5.1 K/uL    Monocytes Absolute 0.6 0.0 - 1.3 K/uL    Eosinophils Absolute 0.0 0.0 - 0.6 K/uL    Basophils Absolute 0.0 0.0 - 0.2 K/uL   Comprehensive Metabolic Panel w/ Reflex to MG   Result Value Ref Range    Sodium 129 (L) 136 - 145 mmol/L    Potassium reflex Magnesium 4.1 3.5 - 5.1 mmol/L    Chloride 92 (L) 99 - 110 mmol/L    CO2 22 21 - 32 mmol/L    Anion Gap 15 3 - 16    Glucose 518 (H) 70 - 99 mg/dL    BUN 15 7 - 20 mg/dL    CREATININE 0.7 0.6 - 1.2 mg/dL    GFR Non-African American >60 >60    GFR African American >60 >60    Calcium 9.0 8.3 - 10.6 mg/dL    Total Protein 8.0 6.4 - 8.2 g/dL    Alb 3.6 3.4 - 5.0 g/dL    Albumin/Globulin Ratio 0.8 (L) 1.1 - 2.2    Total Bilirubin 0.5 0.0 - 1.0 mg/dL    Alkaline Phosphatase 130 (H) 40 - 129 U/L    ALT 25 10 - 40 U/L    AST 35 15 - 37 U/L    Globulin 4.4 g/dL   Troponin   Result Value Ref Range    Troponin <0.01 <0.01 ng/mL   Lactic Acid, Plasma   Result Value Ref Range    Lactic Acid 1.2 0.4 - 2.0 mmol/L   Blood Gas, Venous   Result Value Ref Range    pH, Phillip 7.262 (L) 7.350 - 7.450    pCO2, Phillip 47.6 40.0 - 50.0 mmHg    pO2, Phillip 31.8 25.0 - 40.0 mmHg    HCO3, Venous 21.0 (L) 23.0 - 29.0 mmol/L    Base Excess, Phillip -6.1 (L) -3.0 - 3.0 mmol/L    O2 Sat, Phillip 54 Not Established %    Carboxyhemoglobin 1.9 (H) 0.0 - 1.5 %    MetHgb, Phillip 0.5 <1.5 %    TC02 (Calc), Phillip 22 Not Established mmol/L    O2 Content, Phillip 10 Not Established VOL %    O2 Therapy Unknown    Urinalysis Reflex to Culture Specimen: Urine, clean catch   Result Value Ref Range    Color, UA Yellow Straw/Yellow    Clarity, UA CLOUDY (A) Clear    Glucose, Ur >=1000 (A) Negative mg/dL    Bilirubin Urine Negative Negative    Ketones, Urine 40 (A) Negative mg/dL    Specific Gravity, UA 1.015 1.005 - 1.030    Blood, Urine SMALL (A) Negative    pH, UA 6.5 5.0 - 8.0    Protein,  (A) Negative mg/dL    Urobilinogen, Urine 0.2 <2.0 E.U./dL    Nitrite, Urine POSITIVE (A) Negative    Leukocyte Esterase, Urine TRACE (A) Negative    Microscopic Examination YES     Urine Type NotGiven     Urine Reflex to Culture Yes    Microscopic Urinalysis   Result Value Ref Range    WBC, UA  (A) 0 - 5 /HPF    RBC, UA 21-50 (A) 0 - 4 /HPF    Epithelial Cells, UA 6-10 (A) 0 - 5 /HPF    Bacteria, UA 1+ (A) None Seen /HPF    Amorphous, UA 2+ /HPF   Magnesium   Result Value Ref Range    Magnesium 2.10 1.80 - 2.40 mg/dL   EKG 12 Lead   Result Value Ref Range    Ventricular Rate 113 BPM    Atrial Rate 113 BPM    P-R Interval 160 ms    QRS Duration 80 ms    Q-T Interval 356 ms    QTc Calculation (Bazett) 488 ms    P Axis 53 degrees    R Axis 49 degrees    T Axis 34 degrees    Diagnosis       Sinus tachycardiaOtherwise normal ECGWhen compared with ECG of 24-JUL-2019 08:25,No significant change was found         For further details of Conner Zaman emergency department encounter, please see Eric Krishnan NP's documentation.   62-year-old female here with multiple medical issues, COVID-19 positive, told she had pneumonia on chest x-ray, but she was also found to be in DKA again though she is a type II diabetic, initiated fluids, checked electrolytes, then insulin drip, also found to have UTI, given Rocephin, normal white blood cell count, normal lactate, low suspicion for sepsis, the tachycardia is likely related to the DKA, she had normal renal function, low suspicion for ACS/PE/dissection, given Toradol for the musculoskeletal sounding chest pain from coughing, and Zofran for nausea, she is with normal mentation, no acute respiratory distress, and NP admitted to hospitalist service.     Orders Placed This Encounter   Procedures    Culture, Urine    Culture, Blood 1    Culture, Blood 2    XR CHEST PORTABLE    CBC Auto Differential    Comprehensive Metabolic Panel w/ Reflex to MG    Troponin    Lactic Acid, Plasma    Blood Gas, Venous    Urinalysis Reflex to Culture    Microscopic Urinalysis    D-Dimer, Quantitative    Magnesium    HYPOGLYCEMIA TREATMENT: blood glucose less than 50 mg/dL and patient  ALERT and TOLERATING PO    HYPOGLYCEMIA TREATMENT: blood glucose less than 70 mg/dL and patient ALERT and TOLERATING PO    HYPOGLYCEMIA TREATMENT: blood glucose less than 70 mg/dL and patient NOT ALERT or NPO    Nursing communication    Telemetry Monitoring    Inpatient consult to Hospitalist    POCT Glucose    POCT Glucose    EKG 12 Lead    Insert peripheral IV    PATIENT STATUS (FROM ED OR OR/PROCEDURAL) Inpatient     Orders Placed This Encounter   Medications    0.9 % sodium chloride bolus    DISCONTD: insulin lispro (HUMALOG) injection vial 10 Units    0.9 % sodium chloride bolus    cefTRIAXone (ROCEPHIN) 1 g IVPB in 50 mL D5W minibag     Order Specific Question:   Antimicrobial Indications     Answer:   Urinary Tract Infection    glucose (GLUTOSE) 40 % oral gel 15 g    dextrose 50 % IV solution    glucagon (rDNA) injection 1 mg    dextrose 5 % solution    insulin regular (HUMULIN R;NOVOLIN R) 100 Units in sodium chloride 0.9 % 100 mL infusion    ondansetron (ZOFRAN) injection 4 mg    ketorolac (TORADOL) injection 15 mg     ED Course as of Nov 19 2223   Thu Nov 19, 2020 2034 Bhavani Gipson NP discussed the patient with me and states she will likely be admitted, we discussed additional labs and plan for the patient, since she did have DKA with type 2 diabetes on the last admission, will check pH and urine today as well though it does appear she is primarily here for COVID-19 related issues. [SY]      ED Course User Index  [SY] Lauren Bender DO     The total critical care time spent while evaluating and treating this patient was 31 minutes. This excludes time spent doing separately billable procedures. This includes time at the bedside, data interpretation, medication management, obtaining critical history from collateral sources if the patient is unable to provide it directly, and physician consultation. Specifics of interventions taken and potentially life-threatening diagnostic considerations are listed in the medical decision making. Clinical Impression:  1. 2019 novel coronavirus-infected pneumonia (NCIP)    2. Diabetic ketoacidosis without coma associated with type 2 diabetes mellitus ([facility] [address]    3. Acute cystitis with hematuria    4. Elevated blood pressure reading      Disposition:  Patient admitted to the hospital in stable condition. This chart was generated in part by using Dragon Dictation system and may contain errors related to that system including errors in grammar, punctuation, and spelling, as well as words and phrases that may be inappropriate. If there are any questions or concerns please feel free to contact the dictating provider for clarification.           Lauren Bender DO  11/19/20 9976

## 2020-11-20 NOTE — CARE COORDINATION
CASE MANAGEMENT INITIAL ASSESSMENT      Reviewed chart and completed assessment via telephone with patient    Explained Case Management role/services    Primary contact information: [place] (son)    Health Care Decision Maker: CARRIE Rosenthal  Who do you trust or have selected to make healthcare decisions for you? Name:   Ariadna  Phone  Number: [phone]  Can this person be reached and be able to respond quickly, such as within a few minutes or hours? Yes  Who would be your back-up decision maker? Name none  Phone Number:none    Admit date/status:11/19  Inpatient    Diagnosis:DKA/Covid 23    Is this a Readmission?:  No      Insurance:[employer] Medicare    Precert required for SNF: Yes       3 night stay required: No    Living arrangements, Adls, care needs, prior to admission:Lives in one story home with son. Uses cane to ambulate, but would like walker too. .    Transportation:son    Durable Medical Equipment at home:  Walker__Cane_x_RTS__ BSC__Shower Chair__  02__ HHN__ CPAP__  BiPap__  Hospital Bed__ W/C___ Other__________    Services in the home and/or outpatient, prior to admission:none currently     PT/OT recs:  Not yet ordered    Hospital Exemption Notification (HEN):needed if SNF    Barriers to discharge:none    Plan/comments:Would like to return home, but is open to [place] [address] or SNF if needed. States, \"I have frequent falls\".     ECOC on chart for MD signature  Yes      Bhavani Bernabe RN

## 2020-11-20 NOTE — DISCHARGE INSTR - COC
Continuity of Care Form    Patient Name: Owen Zapata   :  1955  MRN:  [id number]    Admit date:  2020  Discharge date:  ***    Code Status Order: Prior   Advance Directives:   [facility] Documentation       Date/Time Healthcare Directive Type of Healthcare Directive Copy in [facility] [address] Agent's Name Healthcare Agent's Phone Number    20 [phone]  No, patient does not have an advance directive for healthcare treatment  --  --  --  --  --            Admitting Physician:  Matheus Motta DO  PCP: Mignon Meckel, DO, DO    Discharging Nurse: [facility] Unit/Room#: 7062/5577-72  Discharging Unit Phone Number: ***    Emergency Contact:   Extended Emergency Contact Information  [facility] Emergency Contact[facility] [address] Phone: [phone]  Relation: Child    Past Surgical History:  Past Surgical History:   Procedure Laterality Date    CHOLECYSTECTOMY         Immunization History: There is no immunization history on file for this patient.     Active Problems:  Patient Active Problem List   Diagnosis Code    Diabetes ([address] E11.9    Hyperlipidemia E78.5    Depression F32.9    Insomnia G47.00    Neuropathy G62.9    Major depressive disorder, recurrent, in remission ([address] F33.40    Uncontrolled diabetes mellitus ([address] E11.65    Bipolar affective disorder ([address] F31.9    HTN (hypertension), benign I10    DM (diabetes mellitus), secondary, uncontrolled, w/neurologic complic (HCC) T19.97, C35.26    Trigeminal neuralgia of left side of face G50.0    DM (diabetes mellitus) type 2, uncontrolled, with ketoacidosis ([address] E11.10    Abnormal EKG R94.31    Coronary artery disease involving native coronary artery of native heart without angina pectoris I25.10    Diabetic ketoacidosis without coma associated with type 2 diabetes mellitus ([address] E11.10    Abnormal CXR R93.89    DKA, type 2, not at goal ([address] E11.10    COVID-19 U07.1       Isolation/Infection:   Isolation            Droplet Plus          Patient Infection Status       Infection Onset Added Last Indicated Last Indicated By Review Planned Expiration Resolved Resolved By    COVID-19  11/20/20 11/20/20 Joceline Gee RN 11/27/20 12/04/20      + 11/17            Nurse Assessment:  Last Vital Signs: /76   Pulse 102   Temp 99.1 °F (37.3 °C) (Oral)   Resp 18   Ht 5' 2\" (1.575 m)   Wt 138 lb 14.2 oz (63 kg)   SpO2 96%   BMI 25.40 kg/m²     Last documented pain score (0-10 scale): Pain Level: 0  Last Weight:   Wt Readings from Last 1 Encounters:   11/20/20 138 lb 14.2 oz (63 kg)     Mental Status:  oriented and alert    IV Access:  - None    Nursing Mobility/ADLs:  Walking   Assisted  Transfer  Assisted  Bathing  Independent  Dressing  Independent  Toileting  Independent  Feeding  [address]  Independent  Med Delivery   whole    Wound Care Documentation and Therapy:        Elimination:  Continence:   · Bowel: Yes  · Bladder: Yes  Urinary Catheter: None   Colostomy/Ileostomy/Ileal Conduit: No       Date of Last BM: ***    Intake/Output Summary (Last 24 hours) at 11/20/2020 1238  Last data filed at 11/20/2020 0400  Gross per 24 hour   Intake 2050 ml   Output 800 ml   Net 1250 ml     I/O last 3 completed shifts: In: 2050 [IV Piggyback:2050]  Out: 800 [Urine:800]    Safety Concerns:     None    Impairments/Disabilities:      None    Nutrition Therapy:  Current Nutrition Therapy:   - Oral Diet:  General    Routes of Feeding: Oral  Liquids: No Restrictions  Daily Fluid Restriction: no  Last Modified Barium Swallow with Video (Video Swallowing Test): not done    Treatments at the Time of Hospital Discharge:   Respiratory Treatments: ***  Oxygen Therapy:  is not on home oxygen therapy.   Ventilator:    - No ventilator support    Rehab Therapies: Physical Therapy, Occupational Therapy and Nurse  Weight Bearing Status/Restrictions: No weight bearing restirctions  Other Medical Equipment (for information only, NOT a DME order):  walker  Other Treatments: [address]: LEVEL [address] [place] [address]  to establish plan of care for patient over 60 day period   Nursing  Initial home SN evaluation visit to occur within 24-48 hours for:  1)  medication management  2)  VS and clinical assessment  3)  S&S chronic disease exacerbation education + when to contact MD/NP  4)  care coordination  Medication Reconciliation during 1st SN visit  PT/OT/Speech   Evaluations in home within 24-48 hours of discharge to include DME and home safety   Frontload therapy 5 days, then 3x a week   OT to evaluate if patient has [address] needs for personal care    evaluation within 24-48 hours to evaluate resources & insurance for potential [place], [place], LTC, and Medicaid options   Palliative Care referral within 5 days of hospital discharge   PCP Visit scheduled within 3 - 7 days of hospital discharge [facility] [address] (If patient is agreeable and meets guidelines)      Patient's personal belongings (please select all that are sent with patient):  None    RN SIGNATURE:  Electronically signed by Scooter Shipman RN on 12/1/20 at 1:41 PM EST    CASE MANAGEMENT/SOCIAL WORK SECTION    Inpatient Status Date: 11/19    Readmission Risk Assessment Score:  Readmission Risk              Risk of Unplanned Readmission:        12           Discharging to Facility/ Agency   Name:  [facility] care    Address: [address] [place] [address], [address] [place] [address] [place] [address]  Phone: [phone]  Fax: [phone]          / signature: Electronically signed by Zackery Sutton RN on 11/30/20 at 3:50 PM EST    PHYSICIAN SECTION    Prognosis: Fair    Condition at Discharge: Stable    Rehab Potential (if transferring to Rehab): Good    Recommended Labs or Other Treatments After Discharge:    Follow up with PCP in 1 week       Physician Certification: I certify

## 2020-11-20 NOTE — PROGRESS NOTES
Transfer to [facility] from [address] [place] [address]. Leda Douglass and MAGED Woodruff performed complete skin assessment on transfer. Assessment WNL. Upon transferring patient to ordered level of care, patients medications were gathered from the following locations and given to receiving nurse during bedside report:    Lock Box in room :     [x] Yes   [] No   Pyxis Bin:       [x] Yes   [] No      Pyxis Refrigerator:      [x] Yes   [] No   Tube System:       [x] Yes   [] No   LDA's documented:  [x] Yes   [] No          The following paperwork was transferred with patient:    12 hour chart check:       [x] Yes   [] No   Patient belongings:       [x] Yes   [] No      Continuous pulse ox:   [] Yes   [] No      [x] N/A      Tele monitor assigned to patient and placed on patient prior to transfer. CMU Notified at Transfer: [x] Yes   [] No    MD notified via Perfect Serve:   [x] Yes   [] No    Family notified of transfer:    [x] Yes   [] No    Spoke with: Pt will speak with family via cell phone.

## 2020-11-21 LAB
GLUCOSE BLD-MCNC: 172 MG/DL (ref 70–99)
GLUCOSE BLD-MCNC: 292 MG/DL (ref 70–99)
GLUCOSE BLD-MCNC: 295 MG/DL (ref 70–99)
GLUCOSE BLD-MCNC: 303 MG/DL (ref 70–99)
GLUCOSE BLD-MCNC: 384 MG/DL (ref 70–99)
GLUCOSE BLD-MCNC: 387 MG/DL (ref 70–99)
PERFORMED ON: ABNORMAL
URINE CULTURE, ROUTINE: NORMAL

## 2020-11-21 PROCEDURE — 94761 N-INVAS EAR/PLS OXIMETRY MLT: CPT

## 2020-11-21 PROCEDURE — 2580000003 HC RX 258: Performed by: NURSE PRACTITIONER

## 2020-11-21 PROCEDURE — 6370000000 HC RX 637 (ALT 250 FOR IP): Performed by: INTERNAL MEDICINE

## 2020-11-21 PROCEDURE — 6370000000 HC RX 637 (ALT 250 FOR IP): Performed by: NURSE PRACTITIONER

## 2020-11-21 PROCEDURE — 6360000002 HC RX W HCPCS: Performed by: NURSE PRACTITIONER

## 2020-11-21 PROCEDURE — 2700000000 HC OXYGEN THERAPY PER DAY

## 2020-11-21 PROCEDURE — 1200000000 HC SEMI PRIVATE

## 2020-11-21 PROCEDURE — 6360000002 HC RX W HCPCS: Performed by: INTERNAL MEDICINE

## 2020-11-21 RX ORDER — 0.9 % SODIUM CHLORIDE 0.9 %
500 INTRAVENOUS SOLUTION INTRAVENOUS ONCE
Status: COMPLETED | OUTPATIENT
Start: 2020-11-21 | End: 2020-11-21

## 2020-11-21 RX ORDER — INSULIN GLARGINE 100 [IU]/ML
15 INJECTION, SOLUTION SUBCUTANEOUS 2 TIMES DAILY
Status: DISCONTINUED | OUTPATIENT
Start: 2020-11-21 | End: 2020-11-21

## 2020-11-21 RX ORDER — DEXAMETHASONE 4 MG/1
6 TABLET ORAL DAILY
Status: DISCONTINUED | OUTPATIENT
Start: 2020-11-21 | End: 2020-12-01 | Stop reason: HOSPADM

## 2020-11-21 RX ORDER — INSULIN GLARGINE 100 [IU]/ML
20 INJECTION, SOLUTION SUBCUTANEOUS 2 TIMES DAILY
Status: DISCONTINUED | OUTPATIENT
Start: 2020-11-21 | End: 2020-11-22

## 2020-11-21 RX ORDER — LISINOPRIL 10 MG/1
10 TABLET ORAL DAILY
Status: DISCONTINUED | OUTPATIENT
Start: 2020-11-21 | End: 2020-11-23

## 2020-11-21 RX ORDER — ACETAMINOPHEN 325 MG/1
650 TABLET ORAL EVERY 4 HOURS PRN
Status: DISCONTINUED | OUTPATIENT
Start: 2020-11-21 | End: 2020-12-01 | Stop reason: HOSPADM

## 2020-11-21 RX ADMIN — FLUOXETINE 20 MG: 20 CAPSULE ORAL at 08:17

## 2020-11-21 RX ADMIN — BUSPIRONE HYDROCHLORIDE 10 MG: 5 TABLET ORAL at 08:17

## 2020-11-21 RX ADMIN — INSULIN GLARGINE 20 UNITS: 100 INJECTION, SOLUTION SUBCUTANEOUS at 21:42

## 2020-11-21 RX ADMIN — INSULIN LISPRO 3 UNITS: 100 INJECTION, SOLUTION INTRAVENOUS; SUBCUTANEOUS at 08:33

## 2020-11-21 RX ADMIN — GABAPENTIN 400 MG: 400 CAPSULE ORAL at 21:41

## 2020-11-21 RX ADMIN — DEXAMETHASONE 6 MG: 4 TABLET ORAL at 08:17

## 2020-11-21 RX ADMIN — INSULIN LISPRO 9 UNITS: 100 INJECTION, SOLUTION INTRAVENOUS; SUBCUTANEOUS at 18:05

## 2020-11-21 RX ADMIN — ASPIRIN 325 MG ORAL TABLET 325 MG: 325 PILL ORAL at 08:17

## 2020-11-21 RX ADMIN — ACETAMINOPHEN 650 MG: 325 TABLET ORAL at 22:53

## 2020-11-21 RX ADMIN — DIVALPROEX SODIUM 250 MG: 250 TABLET, DELAYED RELEASE ORAL at 21:41

## 2020-11-21 RX ADMIN — BUSPIRONE HYDROCHLORIDE 10 MG: 5 TABLET ORAL at 13:44

## 2020-11-21 RX ADMIN — INSULIN LISPRO 5 UNITS: 100 INJECTION, SOLUTION INTRAVENOUS; SUBCUTANEOUS at 04:18

## 2020-11-21 RX ADMIN — LISINOPRIL 10 MG: 10 TABLET ORAL at 13:44

## 2020-11-21 RX ADMIN — INSULIN LISPRO 15 UNITS: 100 INJECTION, SOLUTION INTRAVENOUS; SUBCUTANEOUS at 12:19

## 2020-11-21 RX ADMIN — ENOXAPARIN SODIUM 40 MG: 40 INJECTION SUBCUTANEOUS at 13:44

## 2020-11-21 RX ADMIN — DIVALPROEX SODIUM 250 MG: 250 TABLET, DELAYED RELEASE ORAL at 08:17

## 2020-11-21 RX ADMIN — GABAPENTIN 400 MG: 400 CAPSULE ORAL at 08:18

## 2020-11-21 RX ADMIN — SODIUM CHLORIDE 500 ML: 9 INJECTION, SOLUTION INTRAVENOUS at 04:07

## 2020-11-21 RX ADMIN — ATORVASTATIN CALCIUM 40 MG: 40 TABLET, FILM COATED ORAL at 08:17

## 2020-11-21 RX ADMIN — GABAPENTIN 400 MG: 400 CAPSULE ORAL at 13:44

## 2020-11-21 RX ADMIN — INSULIN GLARGINE 20 UNITS: 100 INJECTION, SOLUTION SUBCUTANEOUS at 13:51

## 2020-11-21 RX ADMIN — QUETIAPINE FUMARATE 300 MG: 100 TABLET ORAL at 21:41

## 2020-11-21 RX ADMIN — INSULIN LISPRO 6 UNITS: 100 INJECTION, SOLUTION INTRAVENOUS; SUBCUTANEOUS at 21:42

## 2020-11-21 RX ADMIN — BUSPIRONE HYDROCHLORIDE 10 MG: 5 TABLET ORAL at 21:41

## 2020-11-21 RX ADMIN — ACETAMINOPHEN 650 MG: 325 TABLET ORAL at 03:52

## 2020-11-21 RX ADMIN — PANTOPRAZOLE SODIUM 40 MG: 40 TABLET, DELAYED RELEASE ORAL at 08:18

## 2020-11-21 RX ADMIN — INSULIN HUMAN 5 UNITS: 100 INJECTION, SOLUTION PARENTERAL at 05:00

## 2020-11-21 ASSESSMENT — PAIN SCALES - GENERAL
PAINLEVEL_OUTOF10: 7
PAINLEVEL_OUTOF10: 5

## 2020-11-21 NOTE — PROGRESS NOTES
Pt A&O x4. VSS. Denies dyspnea or N/V at this time. Bowel sounds active in all quadrants. Tele monitor in place showing Sinus Tach. Assessment is as charted. CALL LIGHT & BEDSIDE TABLE WITHIN REACH, WHEELS LOCKED, BED IN LOWEST POSITION, BED ALARM ON, PT INSTRUCTED TO CALL OUT FOR ASSISTANCE & EXPRESSED UNDERSTANDING, CALLS OUT APPROPRIATELY.     Electronically signed by Adina Quiles RN on 11/21/2020 at 5:40 AM

## 2020-11-21 NOTE — PROGRESS NOTES
Temp 99.5, SpO2 98% on 3.5L. Pt placed on 2L and is now maintaining 94-96%. All other VSS. Pt currently sitting up in bed, eating breakfast tray, denies needs. Will continue to monitor.

## 2020-11-21 NOTE — PROGRESS NOTES
deficits. Cranial nerves: II-XII intact, grossly non-focal.  Psychiatric: Alert and oriented, thought content appropriate, normal insight  Capillary Refill: Brisk,< 3 seconds   Peripheral Pulses: +2 palpable, equal bilaterally       Labs:   Recent Labs     11/19/20 1920   WBC 8.4   HGB 12.7   HCT 39.1        Recent Labs     11/19/20 1920 11/20/20  0413 11/20/20  0910   * 137 132*   K 4.1 3.4* 3.4*   CL 92* 101 99   CO2 22 26 23   BUN 15 9 8   CREATININE 0.7 <0.5* <0.5*   CALCIUM 9.0 8.4 7.7*     Recent Labs     11/19/20 1920   AST 35   ALT 25   BILITOT 0.5   ALKPHOS 130*     No results for input(s): INR in the last 72 hours. Recent Labs     11/19/20 1920   TROPONINI <0.01       Urinalysis:      Lab Results   Component Value Date    NITRU POSITIVE 11/19/2020    WBCUA  11/19/2020    BACTERIA 1+ 11/19/2020    RBCUA 21-50 11/19/2020    BLOODU SMALL 11/19/2020    SPECGRAV 1.015 11/19/2020    GLUCOSEU >=1000 11/19/2020       Consults:    IP CONSULT TO HOSPITALIST      Assessment/Plan:    Active Hospital Problems    Diagnosis    COVID-19 [U07.1]    DKA, type 2, not at goal [facility]) [E11.10]       COVID 19 - Positive test OSH PTA on or about 17 Nov w/ sxs onset approx 15 Nov.  Started on Decadron - continued. PNA - likely viral 2nd to Covid 19. Not currently on ABX    DKA - initially requiring Insulin gtt, now titrated off. Started on Lantus and titrated up to higher dose BID on 21 Nov. Follow FSBS/SSI high regimen.       HTN/CAD - w/ known CAD but no evidence of active signs/sxs of ischemia/failure. Currently controlled on home meds w/ vitals reviewed and documented as above. HyperLipidemia - controlled on home Statin. Continue, w/ f/u and med adjustment w/ PCP    HypoNatremia - etiology clinically unable to determine but likely hypovolemic. Follow serial labs on IVF. Reviewed and documented as above. HypoKalemia - etiology clinically unable to determine.   Follow serial labs and replace PRN.  Reviewed and documented as above. GERD - w/out active signs/sxs of dysphagia/odynophagia. No evidence of active PUD or hx of GI bleed. Controlled on home PPI - continue. Seizure: Continue medication    Bipolar: Continue medication    Insomnia: Continue medication         DVT Prophylaxis: LMWH  Diet: DIET GENERAL;  Code Status: Prior      PT/OT Eval Status: Not yet ordered. Dispo - Likely Monday 23 Nov at the earliest pending clinical course.      Isadore Rinne, MD

## 2020-11-21 NOTE — PLAN OF CARE
Problem: Falls - Risk of:  Goal: Will remain free from falls  Description: Will remain free from falls  11/21/2020 0553 by Compa Moreno RN  Outcome: Ongoing     Problem: Falls - Risk of:  Goal: Absence of physical injury  Description: Absence of physical injury  Outcome: Ongoing     Problem: Skin Integrity:  Goal: Absence of new skin breakdown  Description: Absence of new skin breakdown  Outcome: Ongoing     Problem: Skin Integrity:  Goal: Will show no infection signs and symptoms  Description: Will show no infection signs and symptoms  Outcome: Not Met This Shift

## 2020-11-22 LAB
ALBUMIN SERPL-MCNC: 2.7 G/DL (ref 3.4–5)
ANION GAP SERPL CALCULATED.3IONS-SCNC: 14 MMOL/L (ref 3–16)
BUN BLDV-MCNC: 18 MG/DL (ref 7–20)
CALCIUM SERPL-MCNC: 9.1 MG/DL (ref 8.3–10.6)
CHLORIDE BLD-SCNC: 102 MMOL/L (ref 99–110)
CO2: 21 MMOL/L (ref 21–32)
CREAT SERPL-MCNC: 0.6 MG/DL (ref 0.6–1.2)
GFR AFRICAN AMERICAN: >60
GFR NON-AFRICAN AMERICAN: >60
GLUCOSE BLD-MCNC: 333 MG/DL (ref 70–99)
GLUCOSE BLD-MCNC: 343 MG/DL (ref 70–99)
GLUCOSE BLD-MCNC: 352 MG/DL (ref 70–99)
GLUCOSE BLD-MCNC: 376 MG/DL (ref 70–99)
GLUCOSE BLD-MCNC: 388 MG/DL (ref 70–99)
GLUCOSE BLD-MCNC: 476 MG/DL (ref 70–99)
GLUCOSE BLD-MCNC: 485 MG/DL (ref 70–99)
HCT VFR BLD CALC: 32.9 % (ref 36–48)
HEMOGLOBIN: 10.9 G/DL (ref 12–16)
MCH RBC QN AUTO: 27.7 PG (ref 26–34)
MCHC RBC AUTO-ENTMCNC: 33.1 G/DL (ref 31–36)
MCV RBC AUTO: 83.9 FL (ref 80–100)
PDW BLD-RTO: 14.9 % (ref 12.4–15.4)
PERFORMED ON: ABNORMAL
PHOSPHORUS: 3.1 MG/DL (ref 2.5–4.9)
PLATELET # BLD: 266 K/UL (ref 135–450)
PMV BLD AUTO: 8.2 FL (ref 5–10.5)
POTASSIUM SERPL-SCNC: 3.8 MMOL/L (ref 3.5–5.1)
RBC # BLD: 3.92 M/UL (ref 4–5.2)
SODIUM BLD-SCNC: 137 MMOL/L (ref 136–145)
WBC # BLD: 4.8 K/UL (ref 4–11)

## 2020-11-22 PROCEDURE — 36415 COLL VENOUS BLD VENIPUNCTURE: CPT

## 2020-11-22 PROCEDURE — 2700000000 HC OXYGEN THERAPY PER DAY

## 2020-11-22 PROCEDURE — 83036 HEMOGLOBIN GLYCOSYLATED A1C: CPT

## 2020-11-22 PROCEDURE — 2500000003 HC RX 250 WO HCPCS: Performed by: INTERNAL MEDICINE

## 2020-11-22 PROCEDURE — 80069 RENAL FUNCTION PANEL: CPT

## 2020-11-22 PROCEDURE — 6360000002 HC RX W HCPCS: Performed by: NURSE PRACTITIONER

## 2020-11-22 PROCEDURE — 6370000000 HC RX 637 (ALT 250 FOR IP): Performed by: INTERNAL MEDICINE

## 2020-11-22 PROCEDURE — 6370000000 HC RX 637 (ALT 250 FOR IP): Performed by: NURSE PRACTITIONER

## 2020-11-22 PROCEDURE — 1200000000 HC SEMI PRIVATE

## 2020-11-22 PROCEDURE — 85027 COMPLETE CBC AUTOMATED: CPT

## 2020-11-22 PROCEDURE — 94761 N-INVAS EAR/PLS OXIMETRY MLT: CPT

## 2020-11-22 PROCEDURE — 6360000002 HC RX W HCPCS: Performed by: INTERNAL MEDICINE

## 2020-11-22 RX ORDER — INSULIN GLARGINE 100 [IU]/ML
35 INJECTION, SOLUTION SUBCUTANEOUS 2 TIMES DAILY
Status: DISCONTINUED | OUTPATIENT
Start: 2020-11-22 | End: 2020-11-23

## 2020-11-22 RX ADMIN — BUSPIRONE HYDROCHLORIDE 10 MG: 5 TABLET ORAL at 08:45

## 2020-11-22 RX ADMIN — ENOXAPARIN SODIUM 40 MG: 40 INJECTION SUBCUTANEOUS at 08:44

## 2020-11-22 RX ADMIN — BUSPIRONE HYDROCHLORIDE 10 MG: 5 TABLET ORAL at 20:14

## 2020-11-22 RX ADMIN — GABAPENTIN 400 MG: 400 CAPSULE ORAL at 20:14

## 2020-11-22 RX ADMIN — DEXAMETHASONE 6 MG: 4 TABLET ORAL at 08:56

## 2020-11-22 RX ADMIN — LISINOPRIL 10 MG: 10 TABLET ORAL at 08:45

## 2020-11-22 RX ADMIN — PANTOPRAZOLE SODIUM 40 MG: 40 TABLET, DELAYED RELEASE ORAL at 05:58

## 2020-11-22 RX ADMIN — DIVALPROEX SODIUM 250 MG: 250 TABLET, DELAYED RELEASE ORAL at 08:45

## 2020-11-22 RX ADMIN — INSULIN LISPRO 12 UNITS: 100 INJECTION, SOLUTION INTRAVENOUS; SUBCUTANEOUS at 08:56

## 2020-11-22 RX ADMIN — BUSPIRONE HYDROCHLORIDE 10 MG: 5 TABLET ORAL at 15:33

## 2020-11-22 RX ADMIN — GABAPENTIN 400 MG: 400 CAPSULE ORAL at 15:52

## 2020-11-22 RX ADMIN — LABETALOL HYDROCHLORIDE 10 MG: 5 INJECTION INTRAVENOUS at 23:40

## 2020-11-22 RX ADMIN — ONDANSETRON 4 MG: 2 INJECTION INTRAMUSCULAR; INTRAVENOUS at 23:43

## 2020-11-22 RX ADMIN — GABAPENTIN 400 MG: 400 CAPSULE ORAL at 08:45

## 2020-11-22 RX ADMIN — DIVALPROEX SODIUM 250 MG: 250 TABLET, DELAYED RELEASE ORAL at 20:14

## 2020-11-22 RX ADMIN — FLUOXETINE 20 MG: 20 CAPSULE ORAL at 08:45

## 2020-11-22 RX ADMIN — INSULIN GLARGINE 35 UNITS: 100 INJECTION, SOLUTION SUBCUTANEOUS at 21:46

## 2020-11-22 RX ADMIN — INSULIN LISPRO 9 UNITS: 100 INJECTION, SOLUTION INTRAVENOUS; SUBCUTANEOUS at 20:14

## 2020-11-22 RX ADMIN — INSULIN LISPRO 12 UNITS: 100 INJECTION, SOLUTION INTRAVENOUS; SUBCUTANEOUS at 12:35

## 2020-11-22 RX ADMIN — INSULIN GLARGINE 20 UNITS: 100 INJECTION, SOLUTION SUBCUTANEOUS at 08:57

## 2020-11-22 RX ADMIN — INSULIN HUMAN 5 UNITS: 100 INJECTION, SOLUTION PARENTERAL at 00:50

## 2020-11-22 RX ADMIN — ATORVASTATIN CALCIUM 40 MG: 40 TABLET, FILM COATED ORAL at 08:45

## 2020-11-22 RX ADMIN — QUETIAPINE FUMARATE 300 MG: 100 TABLET ORAL at 20:14

## 2020-11-22 RX ADMIN — ASPIRIN 325 MG ORAL TABLET 325 MG: 325 PILL ORAL at 08:45

## 2020-11-22 RX ADMIN — INSULIN LISPRO 15 UNITS: 100 INJECTION, SOLUTION INTRAVENOUS; SUBCUTANEOUS at 17:14

## 2020-11-22 RX ADMIN — ACETAMINOPHEN 650 MG: 325 TABLET ORAL at 20:37

## 2020-11-22 ASSESSMENT — PAIN SCALES - GENERAL: PAINLEVEL_OUTOF10: 7

## 2020-11-22 NOTE — PROGRESS NOTES
Hospitalist Progress Note      PCP: Rocael Zimmer DO,     Date of Admission: 11/19/2020    Chief Complaint: Ache/Cough/Fever    Subjective: no new c/o. Medications:  Reviewed    Infusion Medications    dextrose 100 mL/hr (11/20/20 0200)     Scheduled Medications    insulin glargine  35 Units Subcutaneous BID    dexamethasone  6 mg Oral Daily    enoxaparin  40 mg Subcutaneous Daily    lisinopril  10 mg Oral Daily    aspirin  325 mg Oral Daily    atorvastatin  40 mg Oral Daily    busPIRone  10 mg Oral TID    divalproex  250 mg Oral BID    FLUoxetine  20 mg Oral Daily    gabapentin  400 mg Oral TID    pantoprazole  40 mg Oral QAM AC    QUEtiapine  300 mg Oral Nightly    insulin lispro  0-18 Units Subcutaneous TID WC    insulin lispro  0-9 Units Subcutaneous Nightly     PRN Meds: acetaminophen, labetalol, ondansetron, glucose, dextrose, glucagon (rDNA), dextrose      Intake/Output Summary (Last 24 hours) at 11/22/2020 1636  Last data filed at 11/22/2020 1455  Gross per 24 hour   Intake 1284 ml   Output 1375 ml   Net -91 ml       Physical Exam Performed:    BP (!) 165/82   Pulse 100   Temp 98 °F (36.7 °C) (Oral)   Resp 18   Ht 5' 2\" (1.575 m)   Wt 138 lb 14.2 oz (63 kg)   SpO2 92%   BMI 25.40 kg/m²     General appearance: No apparent distress, appears stated age and cooperative. HEENT: Pupils equal, round, and reactive to light. Conjunctivae/corneas clear. Neck: Supple, with full range of motion. No jugular venous distention. Trachea midline. Respiratory:  Normal respiratory effort. Clear to auscultation, bilaterally without Rales/Wheezes/Rhonchi. Cardiovascular: Regular rate and rhythm with normal S1/S2 without murmurs, rubs or gallops. Abdomen: Soft, non-tender, non-distended with normal bowel sounds. Musculoskeletal: No clubbing, cyanosis or edema bilaterally. Full range of motion without deformity.   Skin: Skin color, texture, turgor normal.  No rashes or lesions. Neurologic:  Neurovascularly intact without any focal sensory/motor deficits. Cranial nerves: II-XII intact, grossly non-focal.  Psychiatric: Alert and oriented, thought content appropriate, normal insight  Capillary Refill: Brisk,< 3 seconds   Peripheral Pulses: +2 palpable, equal bilaterally       Labs:   Recent Labs     11/19/20 1920 11/22/20  0536   WBC 8.4 4.8   HGB 12.7 10.9*   HCT 39.1 32.9*    266     Recent Labs     11/20/20  0413 11/20/20  0910 11/22/20  0536    132* 137   K 3.4* 3.4* 3.8    99 102   CO2 26 23 21   BUN 9 8 18   CREATININE <0.5* <0.5* 0.6   CALCIUM 8.4 7.7* 9.1   PHOS  --   --  3.1     Recent Labs     11/19/20 1920   AST 35   ALT 25   BILITOT 0.5   ALKPHOS 130*     No results for input(s): INR in the last 72 hours. Recent Labs     11/19/20 1920   TROPONINI <0.01       Urinalysis:      Lab Results   Component Value Date    NITRU POSITIVE 11/19/2020    WBCUA  11/19/2020    BACTERIA 1+ 11/19/2020    RBCUA 21-50 11/19/2020    BLOODU SMALL 11/19/2020    SPECGRAV 1.015 11/19/2020    GLUCOSEU >=1000 11/19/2020       Consults:    IP CONSULT TO HOSPITALIST      Assessment/Plan:    Active Hospital Problems    Diagnosis    COVID-19 [U07.1]    DKA, type 2, not at goal [facility]) [E11.10]       COVID 19 - Positive test OSH PTA on or about 17 Nov w/ sxs onset approx 15 Nov.  Started on Decadron - continued. PNA - likely viral 2nd to Covid 19. Not currently on ABX    DKA - initially requiring Insulin gtt, now titrated off. Started on Lantus and titrated up to higher dose BID on 21 Nov. Follow FSBS/SSI high regimen.       HTN/CAD - w/ known CAD but no evidence of active signs/sxs of ischemia/failure. Currently controlled on home meds w/ vitals reviewed and documented as above. HyperLipidemia - controlled on home Statin. Continue, w/ f/u and med adjustment w/ PCP    HypoNatremia - etiology clinically unable to determine but likely hypovolemic.   Follow serial labs on IVF. Reviewed and documented as above. HypoKalemia - etiology clinically unable to determine. Follow serial labs and replace PRN. Reviewed and documented as above. GERD - w/out active signs/sxs of dysphagia/odynophagia. No evidence of active PUD or hx of GI bleed. Controlled on home PPI - continue. Seizure: Continue medication    Bipolar: Continue medication    Insomnia: Continue medication         DVT Prophylaxis: LMWH  Diet: DIET GENERAL;  Code Status: Prior      PT/OT Eval Status: Not yet ordered. Dispo - Likely Monday 23 Nov at the earliest pending clinical course.      Carlos Culp MD

## 2020-11-22 NOTE — PROGRESS NOTES
VSS. SpO2 maintaining 92% on 1L. Pt currently resting in bed comfortably, denies needs at this time. Will monitor.

## 2020-11-23 LAB
BLOOD CULTURE, ROUTINE: NORMAL
CULTURE, BLOOD 2: NORMAL
GLUCOSE BLD-MCNC: 283 MG/DL (ref 70–99)
GLUCOSE BLD-MCNC: 373 MG/DL (ref 70–99)
GLUCOSE BLD-MCNC: 374 MG/DL (ref 70–99)
GLUCOSE BLD-MCNC: 379 MG/DL (ref 70–99)
GLUCOSE BLD-MCNC: 381 MG/DL (ref 70–99)
GLUCOSE BLD-MCNC: 384 MG/DL (ref 70–99)
PERFORMED ON: ABNORMAL

## 2020-11-23 PROCEDURE — 6370000000 HC RX 637 (ALT 250 FOR IP): Performed by: INTERNAL MEDICINE

## 2020-11-23 PROCEDURE — 97530 THERAPEUTIC ACTIVITIES: CPT

## 2020-11-23 PROCEDURE — 6360000002 HC RX W HCPCS: Performed by: INTERNAL MEDICINE

## 2020-11-23 PROCEDURE — 6370000000 HC RX 637 (ALT 250 FOR IP): Performed by: NURSE PRACTITIONER

## 2020-11-23 PROCEDURE — 1200000000 HC SEMI PRIVATE

## 2020-11-23 PROCEDURE — 97166 OT EVAL MOD COMPLEX 45 MIN: CPT

## 2020-11-23 PROCEDURE — 97110 THERAPEUTIC EXERCISES: CPT

## 2020-11-23 PROCEDURE — 6360000002 HC RX W HCPCS: Performed by: NURSE PRACTITIONER

## 2020-11-23 PROCEDURE — 97162 PT EVAL MOD COMPLEX 30 MIN: CPT

## 2020-11-23 PROCEDURE — 97535 SELF CARE MNGMENT TRAINING: CPT

## 2020-11-23 RX ORDER — NICOTINE POLACRILEX 4 MG
15 LOZENGE BUCCAL PRN
Status: DISCONTINUED | OUTPATIENT
Start: 2020-11-23 | End: 2020-11-23 | Stop reason: SDUPTHER

## 2020-11-23 RX ORDER — LISINOPRIL 20 MG/1
20 TABLET ORAL DAILY
Status: DISCONTINUED | OUTPATIENT
Start: 2020-11-24 | End: 2020-12-01 | Stop reason: HOSPADM

## 2020-11-23 RX ORDER — AMLODIPINE BESYLATE 5 MG/1
10 TABLET ORAL DAILY
Status: DISCONTINUED | OUTPATIENT
Start: 2020-11-23 | End: 2020-12-01 | Stop reason: HOSPADM

## 2020-11-23 RX ORDER — DEXTROSE MONOHYDRATE 50 MG/ML
100 INJECTION, SOLUTION INTRAVENOUS PRN
Status: DISCONTINUED | OUTPATIENT
Start: 2020-11-23 | End: 2020-11-23 | Stop reason: SDUPTHER

## 2020-11-23 RX ORDER — INSULIN GLARGINE 100 [IU]/ML
50 INJECTION, SOLUTION SUBCUTANEOUS 2 TIMES DAILY
Status: DISCONTINUED | OUTPATIENT
Start: 2020-11-23 | End: 2020-12-01 | Stop reason: HOSPADM

## 2020-11-23 RX ORDER — DEXTROSE MONOHYDRATE 25 G/50ML
12.5 INJECTION, SOLUTION INTRAVENOUS PRN
Status: DISCONTINUED | OUTPATIENT
Start: 2020-11-23 | End: 2020-11-23 | Stop reason: SDUPTHER

## 2020-11-23 RX ADMIN — ASPIRIN 325 MG ORAL TABLET 325 MG: 325 PILL ORAL at 08:22

## 2020-11-23 RX ADMIN — DEXAMETHASONE 6 MG: 4 TABLET ORAL at 08:22

## 2020-11-23 RX ADMIN — ONDANSETRON 4 MG: 2 INJECTION INTRAMUSCULAR; INTRAVENOUS at 07:00

## 2020-11-23 RX ADMIN — INSULIN LISPRO 7 UNITS: 100 INJECTION, SOLUTION INTRAVENOUS; SUBCUTANEOUS at 20:08

## 2020-11-23 RX ADMIN — INSULIN LISPRO 9 UNITS: 100 INJECTION, SOLUTION INTRAVENOUS; SUBCUTANEOUS at 08:26

## 2020-11-23 RX ADMIN — GABAPENTIN 400 MG: 400 CAPSULE ORAL at 09:14

## 2020-11-23 RX ADMIN — LABETALOL HYDROCHLORIDE 10 MG: 5 INJECTION INTRAVENOUS at 09:16

## 2020-11-23 RX ADMIN — INSULIN GLARGINE 50 UNITS: 100 INJECTION, SOLUTION SUBCUTANEOUS at 20:07

## 2020-11-23 RX ADMIN — INSULIN LISPRO 15 UNITS: 100 INJECTION, SOLUTION INTRAVENOUS; SUBCUTANEOUS at 12:43

## 2020-11-23 RX ADMIN — GABAPENTIN 400 MG: 400 CAPSULE ORAL at 20:07

## 2020-11-23 RX ADMIN — DIVALPROEX SODIUM 250 MG: 250 TABLET, DELAYED RELEASE ORAL at 20:07

## 2020-11-23 RX ADMIN — BUSPIRONE HYDROCHLORIDE 10 MG: 5 TABLET ORAL at 08:22

## 2020-11-23 RX ADMIN — INSULIN GLARGINE 35 UNITS: 100 INJECTION, SOLUTION SUBCUTANEOUS at 10:13

## 2020-11-23 RX ADMIN — PANTOPRAZOLE SODIUM 40 MG: 40 TABLET, DELAYED RELEASE ORAL at 06:55

## 2020-11-23 RX ADMIN — BUSPIRONE HYDROCHLORIDE 10 MG: 5 TABLET ORAL at 20:07

## 2020-11-23 RX ADMIN — INSULIN LISPRO 15 UNITS: 100 INJECTION, SOLUTION INTRAVENOUS; SUBCUTANEOUS at 23:33

## 2020-11-23 RX ADMIN — DIVALPROEX SODIUM 250 MG: 250 TABLET, DELAYED RELEASE ORAL at 08:22

## 2020-11-23 RX ADMIN — INSULIN LISPRO 15 UNITS: 100 INJECTION, SOLUTION INTRAVENOUS; SUBCUTANEOUS at 16:36

## 2020-11-23 RX ADMIN — ENOXAPARIN SODIUM 40 MG: 40 INJECTION SUBCUTANEOUS at 08:22

## 2020-11-23 RX ADMIN — LISINOPRIL 10 MG: 10 TABLET ORAL at 08:23

## 2020-11-23 RX ADMIN — INSULIN LISPRO 15 UNITS: 100 INJECTION, SOLUTION INTRAVENOUS; SUBCUTANEOUS at 04:16

## 2020-11-23 RX ADMIN — ACETAMINOPHEN 650 MG: 325 TABLET ORAL at 07:00

## 2020-11-23 RX ADMIN — FLUOXETINE 20 MG: 20 CAPSULE ORAL at 08:22

## 2020-11-23 RX ADMIN — QUETIAPINE FUMARATE 300 MG: 100 TABLET ORAL at 20:07

## 2020-11-23 RX ADMIN — BUSPIRONE HYDROCHLORIDE 10 MG: 5 TABLET ORAL at 14:31

## 2020-11-23 RX ADMIN — GABAPENTIN 400 MG: 400 CAPSULE ORAL at 14:31

## 2020-11-23 RX ADMIN — ONDANSETRON 4 MG: 2 INJECTION INTRAMUSCULAR; INTRAVENOUS at 20:07

## 2020-11-23 RX ADMIN — ATORVASTATIN CALCIUM 40 MG: 40 TABLET, FILM COATED ORAL at 08:22

## 2020-11-23 RX ADMIN — INSULIN HUMAN 10 UNITS: 100 INJECTION, SOLUTION PARENTERAL at 00:10

## 2020-11-23 RX ADMIN — AMLODIPINE BESYLATE 10 MG: 5 TABLET ORAL at 12:42

## 2020-11-23 RX ADMIN — ACETAMINOPHEN 650 MG: 325 TABLET ORAL at 23:33

## 2020-11-23 ASSESSMENT — PAIN SCALES - GENERAL
PAINLEVEL_OUTOF10: 6
PAINLEVEL_OUTOF10: 0
PAINLEVEL_OUTOF10: 5
PAINLEVEL_OUTOF10: 0
PAINLEVEL_OUTOF10: 5

## 2020-11-23 ASSESSMENT — PAIN DESCRIPTION - DESCRIPTORS: DESCRIPTORS: ACHING;DISCOMFORT

## 2020-11-23 ASSESSMENT — PAIN DESCRIPTION - PROGRESSION: CLINICAL_PROGRESSION: NOT CHANGED

## 2020-11-23 ASSESSMENT — PAIN - FUNCTIONAL ASSESSMENT: PAIN_FUNCTIONAL_ASSESSMENT: PREVENTS OR INTERFERES SOME ACTIVE ACTIVITIES AND ADLS

## 2020-11-23 ASSESSMENT — PAIN DESCRIPTION - ONSET: ONSET: ON-GOING

## 2020-11-23 ASSESSMENT — PAIN DESCRIPTION - FREQUENCY: FREQUENCY: INTERMITTENT

## 2020-11-23 ASSESSMENT — PAIN DESCRIPTION - PAIN TYPE: TYPE: CHRONIC PAIN

## 2020-11-23 ASSESSMENT — PAIN DESCRIPTION - LOCATION: LOCATION: GENERALIZED

## 2020-11-23 NOTE — PROGRESS NOTES
Occupational Therapy   Occupational Therapy Initial Assessment/Treatment   Date: 2020   Patient Name: Jackson Somers  MRN: [id number]     : 1955    Date of Service: 2020    Discharge Recommendations:  Continue to assess pending progress, Patient would benefit from continued therapy after discharge, 24 hour supervision or assist, Home with Home health OT, S Level 3(vs. SNF)  OT Equipment Recommendations  Other: If pt d/c home, may benefit from a shower chair for energy conservation    Assessment   Performance deficits / Impairments: Decreased functional mobility ; Decreased endurance;Decreased ADL status; Decreased high-level IADLs;Decreased safe awareness;Decreased balance  Assessment: Patient tolerated OT evaluation and tx session fairly well. Patient noted to demonstrate inconsistencies in ability to complete functional tasks. Patient completed bathroom mobility with CGA-SBA using RW. Anticipate patient could make good progress with POC in order to d/c home with 24hr supervision and HHOT. If  24hr supervision is not available, recommending short term rehab in order to maximize rehab potential and safety. Prognosis: Fair;Guarded;Good  Decision Making: Medium Complexity  OT Education: OT Role;Plan of Care;Orientation;Precautions; ADL Adaptive Strategies;Transfer Training;Energy Conservation  REQUIRES OT FOLLOW UP: Yes  Activity Tolerance  Activity Tolerance: Patient limited by fatigue;Patient Tolerated treatment well  Activity Tolerance: Patient noted to give inconsistent effort during functional tasks. Patient benefit from encouragement to engage in task  Safety Devices  Safety Devices in place: Yes  Type of devices: All fall risk precautions in place; Left in chair;Call light within reach;Nurse notified; Chair alarm in place           Patient Diagnosis(es): The primary encounter diagnosis was 2019 novel coronavirus-infected pneumonia (NCIP).  Diagnoses of Diabetic ketoacidosis without coma associated in the last 6 months       Objective   Vision: Impaired  Vision Exceptions: Cataracts  Hearing: Within functional limits(reports \"i cant hear like i used to\")       Observation/Palpation  Posture: Fair  Balance  Sitting Balance: Supervision  Standing Balance: Contact guard assistance  Functional Mobility  Functional - Mobility Device: Rolling Walker  Activity: To/from bathroom  Assist Level: Contact guard assistance  Toilet Transfers  Toilet - Technique: Ambulating  Equipment Used: Standard toilet  Toilet Transfer: Stand by assistance  ADL  Grooming: Contact guard assistance(hand hygiene at the sink)  Additional Comments: declined the need for additional ADLs; Patient with decreased effort to ADLs prior to requesting assistance.  Patient benefit from verbal encouragement to persist with ADLs  Tone RUE  RUE Tone: Normotonic  Tone LUE  LUE Tone: Normotonic  Coordination  Movements Are Fluid And Coordinated: Yes     Bed mobility  Comment: Patient up in chair at start and end of session  Transfers  Sit to stand: Stand by assistance  Stand to sit: Stand by assistance  Transfer Comments: verbal cues for safety of hand placement     Cognition  Overall Cognitive Status: Exceptions  Arousal/Alertness: Delayed responses to stimuli  Safety Judgement: Decreased awareness of need for safety;Decreased awareness of need for assistance  Insights: Decreased awareness of deficits  Initiation: Requires cues for some  Sequencing: Does not require cues  Cognition Comment: inconsistencies noted in patients ability to complete tasks        Sensation  Overall Sensation Status: (patient reports tingling in bilateral feet)        LUE AROM (degrees)  LUE AROM : WFL  Left Hand AROM (degrees)  Left Hand AROM: WFL  RUE AROM (degrees)  RUE AROM : WFL  Right Hand AROM (degrees)  Right Hand AROM: WFL                      Plan   Plan  Times per week: 3-5x  Current Treatment Recommendations: Patient/Caregiver Education & Training, Strengthening, Balance Training, Functional Mobility Training, Self-Care / ADL, Safety Education & Training, Endurance Training, Home Management Training             AM-PAC Score        AM-PAC Inpatient Daily Activity Raw Score: 18 (11/23/20 1520)  AM-PAC Inpatient ADL T-Scale Score : 38.66 (11/23/20 1520)  ADL Inpatient CMS 0-100% Score: 46.65 (11/23/20 1520)  ADL Inpatient CMS G-Code Modifier : CK (11/23/20 1520)    Goals  Short term goals  Time Frame for Short term goals: by d/c 1 week 11/30/20  Short term goal 1: Pt will complete toileting with mod I  Short term goal 2: Pt will complete LB dressing with mod I  Short term goal 3: Pt will complete toilet transfers with mod I  Short term goal 4: Pt will complete bathroom mobility with mod I using LRAD in order to participate in ADLs       Therapy Time   Individual Concurrent Group Co-treatment   Time In 1424         Time Out 1503         Minutes 39         Timed Code Treatment Minutes: 29 Minutes(10 minute)       WILBER Fermin/L

## 2020-11-23 NOTE — CARE COORDINATION
Chart reviewed day 4. Care managed per IM. Patient from home with son. Reported \"frequent falls\". Therapy eval would be helpful in determining needs. Uses a cane at home. Open to [place] [id number]. Following.  Gita Farris RN

## 2020-11-23 NOTE — PROGRESS NOTES
Hospitalist Progress Note      PCP: Adela Krishnamurthy DO, DO    Date of Admission: 11/19/2020    Chief Complaint: Ache/Cough/Fever    Subjective: still weak/fatigued. Medications:  Reviewed    Infusion Medications    dextrose 100 mL/hr (11/20/20 0200)     Scheduled Medications    insulin lispro  0-18 Units Subcutaneous Q4H    insulin glargine  35 Units Subcutaneous BID    dexamethasone  6 mg Oral Daily    enoxaparin  40 mg Subcutaneous Daily    lisinopril  10 mg Oral Daily    aspirin  325 mg Oral Daily    atorvastatin  40 mg Oral Daily    busPIRone  10 mg Oral TID    divalproex  250 mg Oral BID    FLUoxetine  20 mg Oral Daily    gabapentin  400 mg Oral TID    pantoprazole  40 mg Oral QAM AC    QUEtiapine  300 mg Oral Nightly    insulin lispro  0-9 Units Subcutaneous Nightly     PRN Meds: acetaminophen, labetalol, ondansetron, glucose, dextrose, glucagon (rDNA), dextrose      Intake/Output Summary (Last 24 hours) at 11/23/2020 0914  Last data filed at 11/23/2020 0321  Gross per 24 hour   Intake 1360 ml   Output 1550 ml   Net -190 ml       Physical Exam Performed:    BP (!) 180/91   Pulse 104   Temp 98.4 °F (36.9 °C) (Oral)   Resp 16   Ht 5' 2\" (1.575 m)   Wt 138 lb 14.2 oz (63 kg)   SpO2 91%   BMI 25.40 kg/m²     General appearance: No apparent distress, appears stated age and cooperative. HEENT: Pupils equal, round, and reactive to light. Conjunctivae/corneas clear. Neck: Supple, with full range of motion. No jugular venous distention. Trachea midline. Respiratory:  Normal respiratory effort. Clear to auscultation, bilaterally without Rales/Wheezes/Rhonchi. Cardiovascular: Regular rate and rhythm with normal S1/S2 without murmurs, rubs or gallops. Abdomen: Soft, non-tender, non-distended with normal bowel sounds. Musculoskeletal: No clubbing, cyanosis or edema bilaterally. Full range of motion without deformity.   Skin: Skin color, texture, turgor normal.  No rashes or documented as above. HypoKalemia - etiology clinically unable to determine. Follow serial labs and replace PRN. Reviewed and documented as above. GERD - w/out active signs/sxs of dysphagia/odynophagia. No evidence of active PUD or hx of GI bleed. Controlled on home PPI - continue. Anemia - etiology clinically unable to determine, w/out evidence of active bleeding/hemolysis. Asymptomatic w/out indication for transfusion. Follow serial labs. Reviewed and documented as above. Seizure: Continue medication    Bipolar: Continue medication    Insomnia: Continue medication         DVT Prophylaxis: LMWH  Diet: DIET GENERAL;  Code Status: Prior      PT/OT Eval Status: ordered and pending. Dispo - Likely Tues/Wed 24/25 Nov at the earliest pending clinical course and PT/OT eval/recs.      Ledy Lora MD

## 2020-11-23 NOTE — PROGRESS NOTES
Pt alert and orientated. Call light within reach. No questions or concerns.  Labaeolol given for high blood pressure

## 2020-11-23 NOTE — SIGNIFICANT EVENT
Hyperglycemia reported.  changed to q4 ISS until BG is <180 then can switch back to acqhs with adjusted basal per primary

## 2020-11-23 NOTE — PROGRESS NOTES
Physical Therapy    Facility/Department: [facility] C3 TELE/MED SURG/ONC  Initial Assessment/Treatment Session    NAME: Janet Lr  : 1955  MRN: [id number]    Date of Service: 2020    Discharge Recommendations:  Subacute/Skilled Nursing Facility(Home PT vs SNF)   PT Equipment Recommendations  Equipment Needed: No  Other: Defer to facility    Assessment   Body structures, Functions, Activity limitations: Decreased functional mobility ; Decreased strength;Decreased balance;Decreased endurance  Assessment: Pt is a 63year old previously independent female with PMH including HTN, HLD, DM, CAD, Bipolar Disorder who presented to hospital with N&V, weakness, dyspnea with +COVID. Pt dx with DKA. Upon evaluation, pt presents with impairments listed above requiring CGA-min A for transfers and very short distance (2-4ft) ambulation with RW. Pt is limited by dizziness with ambulation requiring quick return to sit. Pt reports she has had dizzy spells that have caused at least 10 falls in past month. Pt would benefit from continued skilled PT while admitted and at SNF setting to progress strength, balance, endurance, and reduce risk of falls. Barriers to home discharge include [address], no / supervision/assist and high fall risk. Will continue to follow. Treatment Diagnosis: Impaired mobility  Prognosis: Good  Decision Making: Medium Complexity  PT Education: Goals;PT Role;Plan of Care;Precautions;Gait Training;General Safety; Energy Conservation;Transfer Training;Functional Mobility Training;Disease Specific Education  Patient Education: Pt educated regarding safe activity pacing - verbalized understanding  REQUIRES PT FOLLOW UP: Yes  Activity Tolerance  Activity Tolerance: Patient limited by endurance; Patient limited by fatigue  Activity Tolerance: After ~2ft ambulation pt c/o ligththeadedness and dizziness.  /80, SpO2 87% on RA improved to 90% within 1 minute, HR 108bpm. 2nd trial donned 1L NC for ambulation with Spo2 remaining >90% during bout. Patient Diagnosis(es): The primary encounter diagnosis was 2019 novel coronavirus-infected pneumonia (NCIP). Diagnoses of Diabetic ketoacidosis without coma associated with type 2 diabetes mellitus ([facility] [address] Acute cystitis with hematuria, and Elevated blood pressure reading were also pertinent to this visit. has a past medical history of Bipolar affective disorder ([facility] [address] Coronary artery disease involving native coronary artery of native heart without angina pectoris, COVID-19, Depression, Diabetes ([facility] [address] Hyperlipidemia, Hypertension, Insomnia, and Neuropathy. has a past surgical history that includes Cholecystectomy. Restrictions  Restrictions/Precautions  Restrictions/Precautions: Fall Risk, Up as Tolerated, Isolation  Position Activity Restriction  Other position/activity restrictions: Up with Assist; COVID +  Vision/Hearing  Vision: Impaired  Vision Exceptions: Cataracts  Hearing: Within functional limits     Subjective  General  Chart Reviewed: Yes  Patient assessed for rehabilitation services?: Yes  Response To Previous Treatment: Not applicable  Family / Caregiver Present: No  Referring Practitioner: Uday Vang MD  Referral Date : 11/23/20  Diagnosis: DKA  Follows Commands: Within Functional Limits  General Comment  Comments: Cleared for therapy  Subjective  Subjective: Pt seated in chair upon arrival, agreeable to PT Evaluation. Pain Screening  Patient Currently in Pain: Yes  Pain Assessment  Pain Assessment: 0-10  Pain Level: 5  Pain Type: Chronic pain  Pain Location: Generalized  Pain Descriptors: Aching;Discomfort  Pain Frequency: Intermittent  Pain Onset: On-going  Clinical Progression: Not changed  Functional Pain Assessment: Prevents or interferes some active activities and ADLs  Non-Pharmaceutical Pain Intervention(s): Ambulation/Increased Activity; Distraction; Acupressure  Vital Signs  Pulse: 109  Heart Rate Source: Monitor  BP: (!) 147/78  BP Location: Right upper arm  Patient Position: Sitting  Patient Currently in Pain: Yes  Oxygen Therapy  SpO2: 91 %  Pulse Oximeter Device Mode: Intermittent  O2 Device: None (Room air)  Pre Treatment Pain Screening  Intervention List: Patient able to continue with treatment    Orientation  Orientation  Overall Orientation Status: Within Normal Limits  Social/Functional History  Social/Functional History  Lives With: Son(Son who works during the day)  Type of Home: Apartment  Home Layout: One level  Home Access: Stairs to enter with rails  Entrance Stairs - Number of Steps: [address]  Bathroom Shower/Tub: Tub/Shower unit  Bathroom Toilet: Standard  Home Equipment: [employer]  ADL Assistance: Independent  Homemaking Assistance: Needs assistance(Pt is independent with laundry; Son's GF does the cooking and cleaning)  Homemaking Responsibilities: Yes  Laundry Responsibility: Primary  Ambulation Assistance: Independent(w/ Cane)  Transfer Assistance: Independent  Active : No  Patient's  Info: Son or son's gf  Occupation: On disability  Leisure & Hobbies: [address] children  Additional Comments: Patient reports \"dizzy spells\" resulting in 20+ falls in the last 6 months       Objective     Observation/Palpation  Posture: Fair    PROM RLE (degrees)  RLE PROM: WFL  AROM RLE (degrees)  RLE AROM: WFL  Strength RLE  Strength RLE: Exception  R Hip Flexion: 4/5  R Knee Extension: 4+/5  R Ankle Dorsiflexion: 4/5  Strength LLE  Strength LLE: Exception  L Hip Flexion: 4/5  L Knee Extension: 4/5  L Ankle Dorsiflexion: 4/5  Tone RLE  RLE Tone: Normotonic  Tone LLE  LLE Tone: Normotonic  Coordination  Finger to Nose: Normal  Sensation  Overall Sensation Status: Impaired(Diminished sensation L5 dermatome, hx of neuropathy with stabbing pain reported intermittently)  Bed mobility  Supine to Sit: Unable to assess  Sit to Supine: Unable to assess  Comment: Pt sitting in recliner at beginning and end of session.   Transfers  Sit to Stand: Minimal Assistance;Contact guard assistance  Stand to sit: Minimal Assistance;Contact guard assistance  Comment: First trial min A for lift and immediate balance, CGA second trial. Assist for eccentric control when pt becomes dizzy. Ambulation  Ambulation?: Yes  Ambulation 1  Surface: level tile  Device: Rolling Walker  Assistance: Contact guard assistance  Quality of Gait: Decreased step length, decreased clearance and heel strike, cautious gait speed. No overt LOB  Distance: 2ft, 4ft with seated rest break in between. Comments: Limited by reports of dizziness and feeling \"sick to my stomach. \" First trial on RA, 2nd trial on 1L with no change in reported symptoms. Stairs/Curb  Stairs?: No(Unsafe to attempt at this time)     Balance  Posture: Fair  Sitting - Static: Good  Sitting - Dynamic: Good  Standing - Static: Fair  Standing - Dynamic: Fair  Exercises  Heelslides: x10 B  Gluteal Sets: x10 B  Hip Flexion: x10 B  Hip Abduction: x10 B  Knee Long Arc Quad: x10 B  Ankle Pumps: x10 B  Comments: Prn seated rest breaks with skilled monitoring of vitals throughout     Plan   Plan  Times per week: 3-5x/wk  Times per day: Daily  Current Treatment Recommendations: Strengthening, Balance Training, Functional Mobility Training, Transfer Training, Endurance Training, Gait Training, Safety Education & Training, Patient/Caregiver Education & Training  Safety Devices  Type of devices:  All fall risk precautions in place, Call light within reach, Chair alarm in place, Gait belt, Left in chair, Patient at risk for falls  Restraints  Initially in place: No      AM-PAC Score  AM-PAC Inpatient Mobility Raw Score : 15 (11/23/20 1606)  AM-PAC Inpatient T-Scale Score : 39.45 (11/23/20 1606)  Mobility Inpatient CMS 0-100% Score: 57.7 (11/23/20 1606)  Mobility Inpatient CMS G-Code Modifier : CK (11/23/20 1606)          Goals  Short term goals  Time Frame for Short term goals: 1 week, 11/30/20 unless otherwise noted  Short term goal 1:

## 2020-11-23 NOTE — PROGRESS NOTES
Pt A&O x4. VSS. Denies dyspnea or N/V at this time. Reports passing flatus. Pt in pain, see MAR. Assessment is as charted. CALL LIGHT & BEDSIDE TABLE WITHIN REACH, WHEELS LOCKED, BED IN LOWEST POSITION, BED ALARM ON, PT INSTRUCTED TO CALL OUT FOR ASSISTANCE & EXPRESSED UNDERSTANDING, CALLS OUT APPROPRIATELY. Will continue to monitor.      Electronically signed by Joan Duong RN on 11/22/2020 at 10:01 PM

## 2020-11-24 LAB
ESTIMATED AVERAGE GLUCOSE: 326.4 MG/DL
GLUCOSE BLD-MCNC: 158 MG/DL (ref 70–99)
GLUCOSE BLD-MCNC: 177 MG/DL (ref 70–99)
GLUCOSE BLD-MCNC: 265 MG/DL (ref 70–99)
GLUCOSE BLD-MCNC: 324 MG/DL (ref 70–99)
GLUCOSE BLD-MCNC: 349 MG/DL (ref 70–99)
HBA1C MFR BLD: 13 %
PERFORMED ON: ABNORMAL

## 2020-11-24 PROCEDURE — 94761 N-INVAS EAR/PLS OXIMETRY MLT: CPT

## 2020-11-24 PROCEDURE — 6370000000 HC RX 637 (ALT 250 FOR IP): Performed by: INTERNAL MEDICINE

## 2020-11-24 PROCEDURE — 97110 THERAPEUTIC EXERCISES: CPT

## 2020-11-24 PROCEDURE — 6370000000 HC RX 637 (ALT 250 FOR IP): Performed by: NURSE PRACTITIONER

## 2020-11-24 PROCEDURE — 2700000000 HC OXYGEN THERAPY PER DAY

## 2020-11-24 PROCEDURE — 1200000000 HC SEMI PRIVATE

## 2020-11-24 PROCEDURE — 2580000003 HC RX 258: Performed by: INTERNAL MEDICINE

## 2020-11-24 PROCEDURE — 97530 THERAPEUTIC ACTIVITIES: CPT

## 2020-11-24 PROCEDURE — 6360000002 HC RX W HCPCS: Performed by: INTERNAL MEDICINE

## 2020-11-24 PROCEDURE — 6360000002 HC RX W HCPCS: Performed by: NURSE PRACTITIONER

## 2020-11-24 PROCEDURE — 97116 GAIT TRAINING THERAPY: CPT

## 2020-11-24 PROCEDURE — 97535 SELF CARE MNGMENT TRAINING: CPT

## 2020-11-24 RX ORDER — OXYCODONE HYDROCHLORIDE AND ACETAMINOPHEN 5; 325 MG/1; MG/1
1 TABLET ORAL EVERY 4 HOURS PRN
Status: DISCONTINUED | OUTPATIENT
Start: 2020-11-24 | End: 2020-12-01 | Stop reason: HOSPADM

## 2020-11-24 RX ORDER — SODIUM CHLORIDE 9 MG/ML
INJECTION, SOLUTION INTRAVENOUS CONTINUOUS
Status: ACTIVE | OUTPATIENT
Start: 2020-11-24 | End: 2020-11-24

## 2020-11-24 RX ADMIN — INSULIN LISPRO 9 UNITS: 100 INJECTION, SOLUTION INTRAVENOUS; SUBCUTANEOUS at 04:48

## 2020-11-24 RX ADMIN — BUSPIRONE HYDROCHLORIDE 10 MG: 5 TABLET ORAL at 13:44

## 2020-11-24 RX ADMIN — BUSPIRONE HYDROCHLORIDE 10 MG: 5 TABLET ORAL at 21:47

## 2020-11-24 RX ADMIN — ENOXAPARIN SODIUM 40 MG: 40 INJECTION SUBCUTANEOUS at 09:19

## 2020-11-24 RX ADMIN — GABAPENTIN 400 MG: 400 CAPSULE ORAL at 13:44

## 2020-11-24 RX ADMIN — OXYCODONE HYDROCHLORIDE AND ACETAMINOPHEN 1 TABLET: 5; 325 TABLET ORAL at 21:46

## 2020-11-24 RX ADMIN — DIVALPROEX SODIUM 250 MG: 250 TABLET, DELAYED RELEASE ORAL at 21:46

## 2020-11-24 RX ADMIN — INSULIN LISPRO 9 UNITS: 100 INJECTION, SOLUTION INTRAVENOUS; SUBCUTANEOUS at 23:58

## 2020-11-24 RX ADMIN — OXYCODONE HYDROCHLORIDE AND ACETAMINOPHEN 1 TABLET: 5; 325 TABLET ORAL at 13:44

## 2020-11-24 RX ADMIN — AMLODIPINE BESYLATE 10 MG: 5 TABLET ORAL at 09:19

## 2020-11-24 RX ADMIN — INSULIN LISPRO 3 UNITS: 100 INJECTION, SOLUTION INTRAVENOUS; SUBCUTANEOUS at 08:15

## 2020-11-24 RX ADMIN — BUSPIRONE HYDROCHLORIDE 10 MG: 5 TABLET ORAL at 09:19

## 2020-11-24 RX ADMIN — ATORVASTATIN CALCIUM 40 MG: 40 TABLET, FILM COATED ORAL at 09:19

## 2020-11-24 RX ADMIN — GABAPENTIN 400 MG: 400 CAPSULE ORAL at 21:47

## 2020-11-24 RX ADMIN — INSULIN LISPRO 12 UNITS: 100 INJECTION, SOLUTION INTRAVENOUS; SUBCUTANEOUS at 20:43

## 2020-11-24 RX ADMIN — INSULIN LISPRO 3 UNITS: 100 INJECTION, SOLUTION INTRAVENOUS; SUBCUTANEOUS at 11:16

## 2020-11-24 RX ADMIN — QUETIAPINE FUMARATE 300 MG: 100 TABLET ORAL at 21:47

## 2020-11-24 RX ADMIN — SODIUM CHLORIDE: 9 INJECTION, SOLUTION INTRAVENOUS at 13:45

## 2020-11-24 RX ADMIN — DIVALPROEX SODIUM 250 MG: 250 TABLET, DELAYED RELEASE ORAL at 09:19

## 2020-11-24 RX ADMIN — INSULIN LISPRO 12 UNITS: 100 INJECTION, SOLUTION INTRAVENOUS; SUBCUTANEOUS at 16:50

## 2020-11-24 RX ADMIN — DEXAMETHASONE 6 MG: 4 TABLET ORAL at 09:19

## 2020-11-24 RX ADMIN — GABAPENTIN 400 MG: 400 CAPSULE ORAL at 11:13

## 2020-11-24 RX ADMIN — INSULIN GLARGINE 50 UNITS: 100 INJECTION, SOLUTION SUBCUTANEOUS at 20:43

## 2020-11-24 RX ADMIN — INSULIN GLARGINE 50 UNITS: 100 INJECTION, SOLUTION SUBCUTANEOUS at 09:18

## 2020-11-24 RX ADMIN — FLUOXETINE 20 MG: 20 CAPSULE ORAL at 11:14

## 2020-11-24 RX ADMIN — ACETAMINOPHEN 650 MG: 325 TABLET ORAL at 09:18

## 2020-11-24 RX ADMIN — LISINOPRIL 20 MG: 20 TABLET ORAL at 09:19

## 2020-11-24 RX ADMIN — PANTOPRAZOLE SODIUM 40 MG: 40 TABLET, DELAYED RELEASE ORAL at 05:10

## 2020-11-24 RX ADMIN — ASPIRIN 325 MG ORAL TABLET 325 MG: 325 PILL ORAL at 09:18

## 2020-11-24 ASSESSMENT — PAIN DESCRIPTION - PAIN TYPE
TYPE: CHRONIC PAIN
TYPE: ACUTE PAIN

## 2020-11-24 ASSESSMENT — PAIN SCALES - WONG BAKER: WONGBAKER_NUMERICALRESPONSE: 4

## 2020-11-24 ASSESSMENT — PAIN DESCRIPTION - ORIENTATION
ORIENTATION: LOWER
ORIENTATION: LOWER

## 2020-11-24 ASSESSMENT — PAIN DESCRIPTION - LOCATION
LOCATION: BACK

## 2020-11-24 ASSESSMENT — PAIN SCALES - GENERAL
PAINLEVEL_OUTOF10: 6
PAINLEVEL_OUTOF10: 4
PAINLEVEL_OUTOF10: 7
PAINLEVEL_OUTOF10: 6
PAINLEVEL_OUTOF10: 3
PAINLEVEL_OUTOF10: 6

## 2020-11-24 ASSESSMENT — PAIN - FUNCTIONAL ASSESSMENT: PAIN_FUNCTIONAL_ASSESSMENT: PREVENTS OR INTERFERES SOME ACTIVE ACTIVITIES AND ADLS

## 2020-11-24 ASSESSMENT — PAIN DESCRIPTION - ONSET: ONSET: ON-GOING

## 2020-11-24 ASSESSMENT — PAIN DESCRIPTION - FREQUENCY: FREQUENCY: INTERMITTENT

## 2020-11-24 ASSESSMENT — PAIN DESCRIPTION - DESCRIPTORS
DESCRIPTORS: ACHING

## 2020-11-24 ASSESSMENT — PAIN DESCRIPTION - PROGRESSION: CLINICAL_PROGRESSION: NOT CHANGED

## 2020-11-24 ASSESSMENT — PAIN DESCRIPTION - DIRECTION: RADIATING_TOWARDS: LATERALLY

## 2020-11-24 NOTE — PLAN OF CARE
Problem: Falls - Risk of:  Goal: Will remain free from falls  Description: Will remain free from falls  11/23/2020 2242 by Quintin Martins RN  Outcome: Ongoing   Pt remains free from falls during this shift. Pt a/o and calls out appropriately. Bed in lowest position and wheels locked. Call light within reach. Bedside table within reach. Pt reports dizziness. Pt educated to call out when needing assistance. Will continue to monitor.

## 2020-11-24 NOTE — PROGRESS NOTES
Occupational Therapy  Facility/Department: [facility] C3 TELE/MED SURG/ONC  Daily Treatment Note  NAME: Rajwinder Gutierrez  : 1955  MRN: [id number]    Date of Service: 2020    Discharge Recommendations:  Continue to assess pending progress, Patient would benefit from continued therapy after discharge, 24 hour supervision or assist, Home with Home health OT, S Level 3  OT Equipment Recommendations  Other: If pt d/c home, may benefit from a showerchair for energy conservation    Assessment   Performance deficits / Impairments: Decreased functional mobility ; Decreased endurance;Decreased ADL status; Decreased high-level IADLs;Decreased safe awareness;Decreased balance  Assessment: Pt agreeable to therapy but continues to lack energy and endurance. Pt required increased cues for encouragement to participatein functional tasks. Pt educated on the importance of OOB activity and BUE exercises to build strength and endurance. Pt continues to need increased assist for adls. Cont with POC. Prognosis: Fair;Guarded;Good  OT Education: OT Role;Plan of Care;Orientation;Precautions; ADL Adaptive Strategies;Transfer Training;Energy Conservation  REQUIRES OT FOLLOW UP: Yes  Activity Tolerance  Activity Tolerance: Patient limited by fatigue;Patient Tolerated treatment well  Activity Tolerance: Patient noted to give inconsistent effort during functional tasks. Patient benefit from encouragement to engage in task  Safety Devices  Safety Devices in place: Yes  Type of devices: All fall risk precautions in place;Call light within reach;Nurse notified; Bed alarm in place; Left in bed         Patient Diagnosis(es): The primary encounter diagnosis was 2019 novel coronavirus-infected pneumonia (NCIP). Diagnoses of Diabetic ketoacidosis without coma associated with type 2 diabetes mellitus ([facility] [address] Acute cystitis with hematuria, and Elevated blood pressure reading were also pertinent to this visit.       has a past medical history of Bipolar affective disorder ([facility] [address] Coronary artery disease involving native coronary artery of native heart without angina pectoris, COVID-19, Depression, Diabetes ([facility] [address] Hyperlipidemia, Hypertension, Insomnia, and Neuropathy. has a past surgical history that includes Cholecystectomy. Restrictions  Restrictions/Precautions  Restrictions/Precautions: Fall Risk, Up as Tolerated, Isolation  Position Activity Restriction  Other position/activity restrictions: Up with Assist; COVID +     Subjective   General  Chart Reviewed: Yes  Patient assessed for rehabilitation services?: Yes  Family / Caregiver Present: No  Referring Practitioner: Mindi John MD  Diagnosis: COVID +  Subjective  Subjective: Pt pleasant and agreeable to therapy, reporting back pain  General Comment  Comments: RN clayton for therapy  Pain Assessment  Pain Assessment: Faces  Lane-Baker Pain Rating: Hurts little more  Pain Type: Chronic pain  Pain Location: Back  Non-Pharmaceutical Pain Intervention(s): Repositioned  Vital Signs  Patient Currently in Pain: Yes     Orientation  Orientation  Overall Orientation Status: Within Functional Limits     Objective    ADL  Grooming: Contact guard assistance(in stance at sink)  LE Dressing: Stand by assistance(seated to don socks)  Additional Comments: declined the need for additional ADLs; Patient with decreased effort to ADLs prior to requesting assistance.  Patient benefit from verbal encouragement to persist with ADLs        Balance  Sitting Balance: Supervision(seated EOB)  Standing Balance: Contact guard assistance  Functional Mobility  Functional - Mobility Device: Rolling Walker  Activity: To/from bathroom  Assist Level: Contact guard assistance  Functional Mobility Comments: increased time  Bed mobility  Supine to Sit: Stand by assistance  Sit to Supine: Stand by assistance  Transfers  Stand Step Transfers: Stand by assistance  Sit to stand: Stand by assistance  Stand to sit: Stand by assistance  Transfer session, please let this note serve as discharge summary. Please see case management note for discharge disposition. Thank you.

## 2020-11-24 NOTE — PLAN OF CARE
Problem: Falls - Risk of:  Goal: Will remain free from falls  Description: Will remain free from falls  Outcome: Ongoing   Pt calls out for assistance as needed. Chair alarm on and working. Call light within reach. Shira

## 2020-11-24 NOTE — PROGRESS NOTES
Hospitalist Progress Note      PCP: Kaleb Robles DO, DO    Date of Admission: 11/19/2020    Chief Complaint: Ache/Cough/Fever    Subjective: still weak/fatigued. Medications:  Reviewed    Infusion Medications    dextrose 100 mL/hr (11/20/20 0200)     Scheduled Medications    insulin lispro  0-18 Units Subcutaneous Q4H    insulin glargine  50 Units Subcutaneous BID    lisinopril  20 mg Oral Daily    amLODIPine  10 mg Oral Daily    dexamethasone  6 mg Oral Daily    enoxaparin  40 mg Subcutaneous Daily    aspirin  325 mg Oral Daily    atorvastatin  40 mg Oral Daily    busPIRone  10 mg Oral TID    divalproex  250 mg Oral BID    FLUoxetine  20 mg Oral Daily    gabapentin  400 mg Oral TID    pantoprazole  40 mg Oral QAM AC    QUEtiapine  300 mg Oral Nightly    insulin lispro  0-9 Units Subcutaneous Nightly     PRN Meds: acetaminophen, labetalol, ondansetron, glucose, dextrose, glucagon (rDNA), dextrose      Intake/Output Summary (Last 24 hours) at 11/24/2020 1215  Last data filed at 11/23/2020 2219  Gross per 24 hour   Intake 200 ml   Output 600 ml   Net -400 ml       Physical Exam Performed:    BP (!) 155/84   Pulse 109   Temp 98.7 °F (37.1 °C) (Oral)   Resp 20   Ht 5' 2\" (1.575 m)   Wt 138 lb 14.2 oz (63 kg)   SpO2 93%   BMI 25.40 kg/m²     General appearance: No apparent distress, appears stated age and cooperative. HEENT: Pupils equal, round, and reactive to light. Conjunctivae/corneas clear. Neck: Supple, with full range of motion. No jugular venous distention. Trachea midline. Respiratory:  Normal respiratory effort. Clear to auscultation, bilaterally without Rales/Wheezes/Rhonchi. Cardiovascular: Regular rate and rhythm with normal S1/S2 without murmurs, rubs or gallops. Abdomen: Soft, non-tender, non-distended with normal bowel sounds. Musculoskeletal: No clubbing, cyanosis or edema bilaterally. Full range of motion without deformity.   Skin: Skin color, texture, turgor normal.  No rashes or lesions. Neurologic:  Neurovascularly intact without any focal sensory/motor deficits. Cranial nerves: II-XII intact, grossly non-focal.  Psychiatric: Alert and oriented, thought content appropriate, normal insight  Capillary Refill: Brisk,< 3 seconds   Peripheral Pulses: +2 palpable, equal bilaterally       Labs:   Recent Labs     11/22/20  0536   WBC 4.8   HGB 10.9*   HCT 32.9*        Recent Labs     11/22/20  0536      K 3.8      CO2 21   BUN 18   CREATININE 0.6   CALCIUM 9.1   PHOS 3.1     No results for input(s): AST, ALT, BILIDIR, BILITOT, ALKPHOS in the last 72 hours. No results for input(s): INR in the last 72 hours. No results for input(s): Towana Ball in the last 72 hours. Urinalysis:      Lab Results   Component Value Date    NITRU POSITIVE 11/19/2020    WBCUA  11/19/2020    BACTERIA 1+ 11/19/2020    RBCUA 21-50 11/19/2020    BLOODU SMALL 11/19/2020    SPECGRAV 1.015 11/19/2020    GLUCOSEU >=1000 11/19/2020       Consults:    IP CONSULT TO HOSPITALIST      Assessment/Plan:    Active Hospital Problems    Diagnosis    COVID-19 [U07.1]    DKA, type 2, not at goal [facility]) [E11.10]       COVID 19 - Positive test OSH PTA on or about 17 Nov w/ sxs onset approx 15 Nov.  Started on Decadron - continued. Continue PO at discharge. PNA - likely viral 2nd to Covid 19. Not currently on ABX    DKA - initially requiring Insulin gtt, now titrated off. Started on Lantus and titrated up to higher dose BID on 21/23 Nov. Follow FSBS/SSI high regimen.       HTN/CAD - w/ known CAD but no evidence of active signs/sxs of ischemia/failure. Currently uncontrolled on home meds w/ vitals reviewed and documented as above. Lisinopril dose increased and Norvasc started 23 Nov.     HyperLipidemia - controlled on home Statin. Continue, w/ f/u and med adjustment w/ PCP    HypoNatremia - etiology clinically unable to determine but likely hypovolemic.   Follow serial labs on IVF. Reviewed and documented as above. HypoKalemia - etiology clinically unable to determine. Follow serial labs and replace PRN. Reviewed and documented as above. GERD - w/out active signs/sxs of dysphagia/odynophagia. No evidence of active PUD or hx of GI bleed. Controlled on home PPI - continue. Anemia - etiology clinically unable to determine, w/out evidence of active bleeding/hemolysis. Asymptomatic w/out indication for transfusion. Follow serial labs. Reviewed and documented as above. Seizure: Continue medication    Bipolar: Continue medication    Insomnia: Continue medication         DVT Prophylaxis: LMWH  Diet: DIET GENERAL;  Code Status: Prior      PT/OT Eval Status: ordered and pending. Dispo - Likely Tues/Wed 24/25 Nov at the earliest pending clinical course and PT/OT eval/recs.      Isadore Rinne, MD

## 2020-11-24 NOTE — PROGRESS NOTES
Pt assessment completed and charted. VSS. Pt's BP elevated, will monitor. Pt a/ox4. Pt denies pain at this time. Pt reports getting dizzy at times. Pt educated to call out when needing assistance and to not ambulate on own due to dizziness. Pt denies any other needs at this time. Pt calls out appropriately. Will continue to monitor. Pt is a fall risk;  -Bed in lowest position and wheels locked.    -Call light within reach.   -Bedside table within reach.   -Non-skid footwear in place.  -bed check in place

## 2020-11-24 NOTE — CARE COORDINATION
Chart reviewed day 5. Care managed per IM. Therapy with recs for SNF. Spoke with patient. Agreeable to referral to [facility] or [facility]. Both accept covid patients. Referral to [facility] done. Waiting determination of acceptance.  Donnell Meadows RN

## 2020-11-24 NOTE — PROGRESS NOTES
Pt alert and orientated. Call light within reach. Pt complaining of back pain. Will continue to monitor. Shira

## 2020-11-24 NOTE — PROGRESS NOTES
Physical Therapy  Facility/Department: [facility] C3 TELE/MED SURG/ONC  Daily Treatment Note  NAME: Husam Merlos  : 1955  MRN: [id number]    Date of Service: 2020    Discharge Recommendations:  Subacute/Skilled Nursing Facility   PT Equipment Recommendations  Equipment Needed: No  Other: Defer to facility    Assessment   Body structures, Functions, Activity limitations: Decreased functional mobility ; Decreased strength;Decreased balance;Decreased endurance  Assessment: PT tx session focused on functional transfers, gait training and overall activity tolerance. Pt able to progress to ambulating 10ft x 2 with RW and CGA this date, less c/o dizziness. Pt instructed in postural exercises to improve chest expansion and SpO2, pt tolerated well and without complaint. Pt on 1L O2 this date, VSS, RN requesting to keep on 1L at this time. Pt continues to be functioning below baseline, will benefit from continued skilled PT while admitted and at SNF to progress strenth, balance, endurance and reduce risk of falls. Barriers to home discharge include [address], no  supervision/assist and high fall risk. Will continue to follow. Treatment Diagnosis: Impaired mobility  Prognosis: Good  Decision Making: Medium Complexity  PT Education: Goals;PT Role;Plan of Care;Precautions;Gait Training;General Safety; Energy Conservation;Transfer Training;Functional Mobility Training;Disease Specific Education  Patient Education: Pt educated regarding safe activity pacing - verbalized understanding  REQUIRES PT FOLLOW UP: Yes  Activity Tolerance  Activity Tolerance: Patient limited by endurance; Patient limited by fatigue  Activity Tolerance: SpO2 93% on 1L immediately following ambulation this date. Mild c/o lightheadedness with ambulation but was able to complete. Patient Diagnosis(es): The primary encounter diagnosis was 2019 novel coronavirus-infected pneumonia (NCIP).  Diagnoses of Diabetic ketoacidosis without coma associated with type 2 diabetes mellitus ([facility] [address] Acute cystitis with hematuria, and Elevated blood pressure reading were also pertinent to this visit. has a past medical history of Bipolar affective disorder ([facility] [address] Coronary artery disease involving native coronary artery of native heart without angina pectoris, COVID-19, Depression, Diabetes ([facility] [address] Hyperlipidemia, Hypertension, Insomnia, and Neuropathy. has a past surgical history that includes Cholecystectomy. Restrictions  Restrictions/Precautions  Restrictions/Precautions: Fall Risk, Up as Tolerated, Isolation  Position Activity Restriction  Other position/activity restrictions: Up with Assist; COVID +  Subjective   General  Chart Reviewed: Yes  Response To Previous Treatment: Patient with no complaints from previous session. Family / Caregiver Present: No  Referring Practitioner: Lindwood Cabot, MD  Subjective  Subjective: Pt lying in bed upon arrival, agreeable to PT tx session but c/o back pain.   General Comment  Comments: Cleared for therapy  Pain Screening  Patient Currently in Pain: Yes  Pain Assessment  Pain Assessment: 0-10  Pain Level: 6  Pain Type: Chronic pain  Pain Location: Back  Pain Orientation: Lower  Pain Radiating Towards: Laterally  Pain Descriptors: Aching  Pain Frequency: Intermittent  Pain Onset: On-going  Clinical Progression: Not changed  Functional Pain Assessment: Prevents or interferes some active activities and ADLs  Vital Signs  Pulse: 101  Heart Rate Source: Monitor  BP: 136/76  BP Location: Left upper arm  Patient Position: High fowlers  Patient Currently in Pain: Yes  Oxygen Therapy  SpO2: 96 %  Pulse Oximeter Device Mode: Intermittent  O2 Device: Nasal cannula  O2 Flow Rate (L/min): 1 L/min          Objective   Bed mobility  Supine to Sit: Stand by assistance(HOB slightly elevated, use of side rails.)  Sit to Supine: Unable to assess(Pt seated in recliner at end of session)  Transfers  Sit to Stand: Contact guard assistance;Stand by assistance  Stand to sit: Contact guard assistance  Comment: SIt<>stand transfers from EOB and toilet with CGA progressing to SBA. Improved immediate balance. Ambulation  Ambulation?: Yes  More Ambulation?: No  Ambulation 1  Surface: level tile  Device: Rolling Walker  Other Apparatus: O2(1L)  Quality of Gait: Decreased step length, decreased clearance and heel strike, cautious gait speed. No overt LOB  Distance: 10ft x 2  Comments: SpO2 remained 93%, HR 111bpm immediately following. Other exercises  Other exercises?: Yes  Other exercises 1: Scapular retraction 2 x 10  Other exercises 2: Posterior shoulder rolls 2 x 10           AM-PAC Score  AM-PAC Inpatient Mobility Raw Score : 17 (11/24/20 1445)  AM-PAC Inpatient T-Scale Score : 42.13 (11/24/20 1445)  Mobility Inpatient CMS 0-100% Score: 50.57 (11/24/20 1445)  Mobility Inpatient CMS G-Code Modifier : CK (11/24/20 1445)          Goals  Short term goals  Time Frame for Short term goals: 1 week, 11/30/20 unless otherwise noted  Short term goal 1: Pt will perform sit<>stand transfers with mod I - 11/24: CGA-SBA  Short term goal 2: Pt will ambulate 50ft with LRAD and supervision - 11/24: 10ft, RW, CGA  Short term goal 3: Pt will ascend/descend 5 steps to begin stair training for entry into apartment with [address] support. - 11/24: DNT  Short term goal 4: By 11/26, pt will tolerate x12-15 reps BLE exercise to assist with functional transfers with Spo2 remaining >90%  Patient Goals   Patient goals : \"To walk on my own. \"    Plan    Plan  Times per week: 3-5x/wk  Times per day: Daily  Current Treatment Recommendations: Strengthening, Balance Training, Functional Mobility Training, Transfer Training, Endurance Training, Gait Training, Safety Education & Training, Patient/Caregiver Education & Training  Safety Devices  Type of devices:  All fall risk precautions in place, Call light within reach, Chair alarm in place, Gait belt, Left in chair, Patient at risk for

## 2020-11-25 LAB
GLUCOSE BLD-MCNC: 200 MG/DL (ref 70–99)
GLUCOSE BLD-MCNC: 203 MG/DL (ref 70–99)
GLUCOSE BLD-MCNC: 224 MG/DL (ref 70–99)
GLUCOSE BLD-MCNC: 297 MG/DL (ref 70–99)
GLUCOSE BLD-MCNC: 301 MG/DL (ref 70–99)
GLUCOSE BLD-MCNC: 346 MG/DL (ref 70–99)
GLUCOSE BLD-MCNC: 480 MG/DL (ref 70–99)
PERFORMED ON: ABNORMAL

## 2020-11-25 PROCEDURE — 6360000002 HC RX W HCPCS: Performed by: INTERNAL MEDICINE

## 2020-11-25 PROCEDURE — 6360000002 HC RX W HCPCS: Performed by: NURSE PRACTITIONER

## 2020-11-25 PROCEDURE — 97530 THERAPEUTIC ACTIVITIES: CPT

## 2020-11-25 PROCEDURE — 94761 N-INVAS EAR/PLS OXIMETRY MLT: CPT

## 2020-11-25 PROCEDURE — 1200000000 HC SEMI PRIVATE

## 2020-11-25 PROCEDURE — 6370000000 HC RX 637 (ALT 250 FOR IP): Performed by: INTERNAL MEDICINE

## 2020-11-25 PROCEDURE — 2700000000 HC OXYGEN THERAPY PER DAY

## 2020-11-25 PROCEDURE — 97116 GAIT TRAINING THERAPY: CPT

## 2020-11-25 RX ADMIN — OXYCODONE HYDROCHLORIDE AND ACETAMINOPHEN 1 TABLET: 5; 325 TABLET ORAL at 14:04

## 2020-11-25 RX ADMIN — LISINOPRIL 20 MG: 20 TABLET ORAL at 08:22

## 2020-11-25 RX ADMIN — INSULIN LISPRO 12 UNITS: 100 INJECTION, SOLUTION INTRAVENOUS; SUBCUTANEOUS at 17:35

## 2020-11-25 RX ADMIN — OXYCODONE HYDROCHLORIDE AND ACETAMINOPHEN 1 TABLET: 5; 325 TABLET ORAL at 20:26

## 2020-11-25 RX ADMIN — DEXAMETHASONE 6 MG: 4 TABLET ORAL at 08:23

## 2020-11-25 RX ADMIN — BUSPIRONE HYDROCHLORIDE 10 MG: 5 TABLET ORAL at 08:23

## 2020-11-25 RX ADMIN — BUSPIRONE HYDROCHLORIDE 10 MG: 5 TABLET ORAL at 14:03

## 2020-11-25 RX ADMIN — ENOXAPARIN SODIUM 40 MG: 40 INJECTION SUBCUTANEOUS at 08:23

## 2020-11-25 RX ADMIN — OXYCODONE HYDROCHLORIDE AND ACETAMINOPHEN 1 TABLET: 5; 325 TABLET ORAL at 05:19

## 2020-11-25 RX ADMIN — GABAPENTIN 400 MG: 400 CAPSULE ORAL at 08:22

## 2020-11-25 RX ADMIN — FLUOXETINE 20 MG: 20 CAPSULE ORAL at 08:22

## 2020-11-25 RX ADMIN — AMLODIPINE BESYLATE 10 MG: 5 TABLET ORAL at 08:22

## 2020-11-25 RX ADMIN — DIVALPROEX SODIUM 250 MG: 250 TABLET, DELAYED RELEASE ORAL at 08:23

## 2020-11-25 RX ADMIN — INSULIN LISPRO 6 UNITS: 100 INJECTION, SOLUTION INTRAVENOUS; SUBCUTANEOUS at 12:55

## 2020-11-25 RX ADMIN — DIVALPROEX SODIUM 250 MG: 250 TABLET, DELAYED RELEASE ORAL at 20:25

## 2020-11-25 RX ADMIN — LABETALOL HYDROCHLORIDE 10 MG: 5 INJECTION INTRAVENOUS at 05:07

## 2020-11-25 RX ADMIN — INSULIN GLARGINE 50 UNITS: 100 INJECTION, SOLUTION SUBCUTANEOUS at 20:25

## 2020-11-25 RX ADMIN — BUSPIRONE HYDROCHLORIDE 10 MG: 5 TABLET ORAL at 20:24

## 2020-11-25 RX ADMIN — INSULIN LISPRO 18 UNITS: 100 INJECTION, SOLUTION INTRAVENOUS; SUBCUTANEOUS at 20:25

## 2020-11-25 RX ADMIN — GABAPENTIN 400 MG: 400 CAPSULE ORAL at 14:04

## 2020-11-25 RX ADMIN — ATORVASTATIN CALCIUM 40 MG: 40 TABLET, FILM COATED ORAL at 08:22

## 2020-11-25 RX ADMIN — QUETIAPINE FUMARATE 300 MG: 100 TABLET ORAL at 20:24

## 2020-11-25 RX ADMIN — INSULIN LISPRO 6 UNITS: 100 INJECTION, SOLUTION INTRAVENOUS; SUBCUTANEOUS at 09:33

## 2020-11-25 RX ADMIN — PANTOPRAZOLE SODIUM 40 MG: 40 TABLET, DELAYED RELEASE ORAL at 05:04

## 2020-11-25 RX ADMIN — ASPIRIN 325 MG ORAL TABLET 325 MG: 325 PILL ORAL at 08:23

## 2020-11-25 RX ADMIN — GABAPENTIN 400 MG: 400 CAPSULE ORAL at 20:24

## 2020-11-25 RX ADMIN — INSULIN LISPRO 6 UNITS: 100 INJECTION, SOLUTION INTRAVENOUS; SUBCUTANEOUS at 05:07

## 2020-11-25 RX ADMIN — INSULIN GLARGINE 50 UNITS: 100 INJECTION, SOLUTION SUBCUTANEOUS at 09:34

## 2020-11-25 ASSESSMENT — PAIN DESCRIPTION - PAIN TYPE
TYPE: CHRONIC PAIN
TYPE: CHRONIC PAIN

## 2020-11-25 ASSESSMENT — PAIN SCALES - GENERAL
PAINLEVEL_OUTOF10: 8
PAINLEVEL_OUTOF10: 7
PAINLEVEL_OUTOF10: 8
PAINLEVEL_OUTOF10: 7
PAINLEVEL_OUTOF10: 8
PAINLEVEL_OUTOF10: 6

## 2020-11-25 ASSESSMENT — PAIN DESCRIPTION - LOCATION
LOCATION: BACK
LOCATION: BACK

## 2020-11-25 ASSESSMENT — PAIN DESCRIPTION - DESCRIPTORS: DESCRIPTORS: ACHING

## 2020-11-25 ASSESSMENT — PAIN DESCRIPTION - ORIENTATION: ORIENTATION: LOWER

## 2020-11-25 ASSESSMENT — PAIN - FUNCTIONAL ASSESSMENT
PAIN_FUNCTIONAL_ASSESSMENT: ACTIVITIES ARE NOT PREVENTED
PAIN_FUNCTIONAL_ASSESSMENT: ACTIVITIES ARE NOT PREVENTED

## 2020-11-25 NOTE — PROGRESS NOTES
Got pt's vitals pt's O2 95%. Removed O2 of 1L NC. Pt's stats 93%, pt got up to [facility] pt stats low 80's. Pt's O2 replaced, Pt on 1L NC. Pt now stating 91-92 % on 1L O2 NC. Will continue to monitor.

## 2020-11-25 NOTE — CARE COORDINATION
RN CM followed up on referral to [facility] yesterday. Per Arm[facility], they will not have a COVID bed until early next week. Call placed to Pt for new facility choice. NA in room. Will attempt to contact again soon.

## 2020-11-25 NOTE — PROGRESS NOTES
Comprehensive Nutrition Assessment    Type and Reason for Visit:  Reassess    Nutrition Recommendations/Plan:   1. Modify diet to carb control   2. Encourage PO intakes   3. Obtain new weight   4. Monitor nutrition adequacy, pertinent labs, bowel habits, wt changes, and clinical progress    Nutrition Assessment:  Follow up: Pt remains nutritionally at risk AEB frequent nausea. Pt reports fair appetite, consuming % of meals per EMR. Pt declined ONS. BG remain elevated, MD continues to increase insulin regimen. Pt would benefit from carb control diet. Malnutrition Assessment:  Malnutrition Status: At risk for malnutrition (Comment)      Estimated Daily Nutrient Needs:  Energy (kcal):  6689-6570; Weight Used for Energy Requirements:  Current(63 kg)     Protein (g):  76-88 g; Weight Used for Protein Requirements:  Current(1.2-1.4)        Fluid (ml/day):   ; Method Used for Fluid Requirements:  1 ml/kcal      Nutrition Related Findings:  BG elevated but trending down, +steriods, BM 11/24      Wounds:  None       Current Nutrition Therapies:    DIET GENERAL; Anthropometric Measures:  · Height: 5' 2\" (157.5 cm)  · Current Body Weight: 138 lb (62.6 kg)   · Ideal Body Weight: 110 lbs;   · BMI: 25.2  · BMI Categories: Overweight (BMI 25.0-29. 9)       Nutrition Diagnosis:   · Inadequate oral intake related to pain, early satiety as evidenced by poor intake prior to admission, nausea      Nutrition Interventions:   Food and/or Nutrient Delivery:  Continue Current Diet  Nutrition Education/Counseling:  Education declined   Coordination of Nutrition Care:  Continue to monitor while inpatient    Goals:   Tolerate most appropriate form of nutrition therapy this admission       Nutrition Monitoring and Evaluation:   Behavioral-Environmental Outcomes:  Beliefs and Attitutes   Food/Nutrient Intake Outcomes:  Food and Nutrient Intake, Diet Advancement/Tolerance  Physical Signs/Symptoms Outcomes:  Biochemical Data Discharge Planning:    Continue current diet     Electronically signed by Saman Norton MS, RD, LD on 11/25/20 at 10:19 AM EST    Contact: [phone]

## 2020-11-25 NOTE — PROGRESS NOTES
Physical Therapy  Facility/Department: [facility] C3 TELE/MED SURG/ONC  Daily Treatment Note  NAME: Eileen Matthews  : 1955  MRN: [id number]    Date of Service: 2020    Discharge Recommendations:  Subacute/Skilled Nursing Facility   PT Equipment Recommendations  Equipment Needed: No  Other: Defer to facility  If pt is unable to be seen after this session, please let this note serve as discharge summary. Please see case management note for discharge disposition. Thank you. Assessment   Body structures, Functions, Activity limitations: Decreased functional mobility ; Decreased strength;Decreased balance;Decreased endurance  Assessment: Pt showing some improvement in bed mobility at Supervision and transfers with RW at SBA. OOB mobility remains limited due to fatigue. Able to ambulate very slowly to/from toilet with RW and 1L O2 at SBA. Pt attempts standing marches for improved hip/knee flexion in ambulation and is able to tolerate 6 reps. Will continue to benefit from skilled PT to advance mobility and activity tolerance. Continue to recommend SNF upon d/c. Barriers to home d/c include [address], no 24/7 sup/assist, and high fall risk. PT Education: Goals;PT Role;Plan of Care;Precautions;Gait Training;General Safety; Energy Conservation;Transfer Training;Functional Mobility Training;Disease Specific Education  Patient Education: Pt educated regarding safe activity pacing and cues for breathing technique - verbalized understanding  REQUIRES PT FOLLOW UP: Yes  Activity Tolerance  Activity Tolerance: Patient Tolerated treatment well;Patient limited by fatigue  Activity Tolerance: After ambulation, SpO2 90% returning to 94% within 1 minute seated rest; , /72. Pt c/o mild lightheadedness resolving within 1 minute. Patient Diagnosis(es): The primary encounter diagnosis was 2019 novel coronavirus-infected pneumonia (NCIP).  Diagnoses of Diabetic ketoacidosis without coma associated with type 2 diabetes foot length), and upright posture. Pt is mildly unsteady as a function of slow pace but no overt LOB. Gait Deviations: Slow Breana;Decreased step length;Decreased step height  Distance: 10 ft x 2  Comments: SpO2 90% after ambulation  Stairs/Curb  Stairs?: No        Other exercises  Other exercises?: Yes  Other exercises 1: Standing marches x6 BL, at CGA. Pt required seated rest break due to fatigue. SpO2 remained 94-95%, HR 80.                        AM-PAC Score  AM-PAC Inpatient Mobility Raw Score : 17 (11/25/20 1436)  AM-PAC Inpatient T-Scale Score : 42.13 (11/25/20 1436)  Mobility Inpatient CMS 0-100% Score: 50.57 (11/25/20 1436)  Mobility Inpatient CMS G-Code Modifier : CK (11/25/20 1436)          Goals  Short term goals  Time Frame for Short term goals: 1 week, 11/30/20 unless otherwise noted  Short term goal 1: Pt will perform sit<>stand transfers with mod I - 11/25 SBA  Short term goal 2: Pt will ambulate 50ft with LRAD and supervision - 11/25: 10ft, RW, SBA  Short term goal 3: Pt will ascend/descend 5 steps to begin stair training for entry into apartment with [address] [place] [address] support. - 11/25: DNT  Short term goal 4: By 11/26, pt will tolerate x12-15 reps BLE exercise to assist with functional transfers with Spo2 remaining >90% - 11/25 DNT  Patient Goals   Patient goals : \"To walk on my own. \"    Plan    Plan  Times per week: 3-5x/wk  Times per day: Daily  Current Treatment Recommendations: Strengthening, Balance Training, Functional Mobility Training, Transfer Training, Endurance Training, Gait Training, Safety Education & Training, Patient/Caregiver Education & Training  Safety Devices  Type of devices:  All fall risk precautions in place, Call light within reach, Chair alarm in place, Gait belt, Left in chair, Patient at risk for falls  Restraints  Initially in place: No     Therapy Time   Individual Concurrent Group Co-treatment   Time In 1426         Time Out 1449         Minutes 23         Timed Code Treatment Minutes: 21 Minutes       Tayla Obrien, DPT

## 2020-11-25 NOTE — PROGRESS NOTES
Pt assessment completed and charted. VSS. Pt's BP elevated, will monitor. Pt a/ox4. pt reports pain 7/10 in lower back, pt educated on prn pain meds, prn pain meds given per mar. Pt reports getting dizzy at times. Pt educated to call out when needing assistance and to not ambulate on own due to dizziness. Pt is Q4 BS, BS are still remaining high. Pt denies any other needs at this time. Pt calls out appropriately. Will continue to monitor.        Pt is a fall risk;  -Bed in lowest position and wheels locked.    -Call light within reach.   -Bedside table within reach.   -Non-skid footwear in place.  -bed check in place

## 2020-11-25 NOTE — PROGRESS NOTES
Hospitalist Progress Note      PCP: George Nam DO,     Date of Admission: 11/19/2020    Chief Complaint: Ache/Cough/Fever    Subjective: still weak/fatigued. Medications:  Reviewed    Infusion Medications    dextrose 100 mL/hr (11/20/20 0200)     Scheduled Medications    insulin lispro  0-18 Units Subcutaneous Q4H    insulin glargine  50 Units Subcutaneous BID    lisinopril  20 mg Oral Daily    amLODIPine  10 mg Oral Daily    dexamethasone  6 mg Oral Daily    enoxaparin  40 mg Subcutaneous Daily    aspirin  325 mg Oral Daily    atorvastatin  40 mg Oral Daily    busPIRone  10 mg Oral TID    divalproex  250 mg Oral BID    FLUoxetine  20 mg Oral Daily    gabapentin  400 mg Oral TID    pantoprazole  40 mg Oral QAM AC    QUEtiapine  300 mg Oral Nightly    insulin lispro  0-9 Units Subcutaneous Nightly     PRN Meds: oxyCODONE-acetaminophen, acetaminophen, labetalol, ondansetron, glucose, dextrose, glucagon (rDNA), dextrose      Intake/Output Summary (Last 24 hours) at 11/25/2020 1434  Last data filed at 11/25/2020 1159  Gross per 24 hour   Intake 1319.17 ml   Output --   Net 1319.17 ml       Physical Exam Performed:    /73   Pulse 100   Temp 97.4 °F (36.3 °C) (Oral)   Resp 16   Ht 5' 2\" (1.575 m)   Wt 138 lb 14.2 oz (63 kg)   SpO2 94%   BMI 25.40 kg/m²     General appearance: No apparent distress, appears stated age and cooperative. HEENT: Pupils equal, round, and reactive to light. Conjunctivae/corneas clear. Neck: Supple, with full range of motion. No jugular venous distention. Trachea midline. Respiratory:  Normal respiratory effort. Clear to auscultation, bilaterally without Rales/Wheezes/Rhonchi. Cardiovascular: Regular rate and rhythm with normal S1/S2 without murmurs, rubs or gallops. Abdomen: Soft, non-tender, non-distended with normal bowel sounds. Musculoskeletal: No clubbing, cyanosis or edema bilaterally. Full range of motion without deformity.   Skin: Skin color, texture, turgor normal.  No rashes or lesions. Neurologic:  Neurovascularly intact without any focal sensory/motor deficits. Cranial nerves: II-XII intact, grossly non-focal.  Psychiatric: Alert and oriented, thought content appropriate, normal insight  Capillary Refill: Brisk,< 3 seconds   Peripheral Pulses: +2 palpable, equal bilaterally       Labs:   No results for input(s): WBC, HGB, HCT, PLT in the last 72 hours. No results for input(s): NA, K, CL, CO2, BUN, CREATININE, CALCIUM, PHOS in the last 72 hours. Invalid input(s): MAGNES  No results for input(s): AST, ALT, BILIDIR, BILITOT, ALKPHOS in the last 72 hours. No results for input(s): INR in the last 72 hours. No results for input(s): Jayden Kings in the last 72 hours. Urinalysis:      Lab Results   Component Value Date    NITRU POSITIVE 11/19/2020    WBCUA  11/19/2020    BACTERIA 1+ 11/19/2020    RBCUA 21-50 11/19/2020    BLOODU SMALL 11/19/2020    SPECGRAV 1.015 11/19/2020    GLUCOSEU >=1000 11/19/2020       Consults:    IP CONSULT TO HOSPITALIST      Assessment/Plan:    Active Hospital Problems    Diagnosis    COVID-19 [U07.1]    DKA, type 2, not at goal [facility]) [E11.10]       COVID 19 POSITIVE - Positive test OSH PTA on or about 17 Nov w/ sxs onset approx 15 Nov.  Started on Decadron - continued. Continue PO at discharge. PNA - likely viral 2nd to Covid 19. Not currently on ABX    DKA - initially requiring Insulin gtt, now titrated off. Started on Lantus and titrated up to higher dose BID on 21/23 Nov. Follow FSBS/SSI high regimen.       HTN/CAD - w/ known CAD but no evidence of active signs/sxs of ischemia/failure. Currently uncontrolled on home meds w/ vitals reviewed and documented as above. Lisinopril dose increased and Norvasc started 23 Nov.     HyperLipidemia - controlled on home Statin. Continue, w/ f/u and med adjustment w/ PCP    HypoNatremia - etiology clinically unable to determine but likely hypovolemic.

## 2020-11-25 NOTE — PLAN OF CARE
Problem: Nutrition  Goal: Optimal nutrition therapy  Outcome: Ongoing  Note: Nutrition Problem #1: Inadequate oral intake  Intervention: Food and/or Nutrient Delivery: Continue Current Diet  Nutritional Goals:  Tolerate most appropriate form of nutrition therapy this admission

## 2020-11-26 LAB
ALBUMIN SERPL-MCNC: 2.8 G/DL (ref 3.4–5)
ANION GAP SERPL CALCULATED.3IONS-SCNC: 9 MMOL/L (ref 3–16)
BUN BLDV-MCNC: 36 MG/DL (ref 7–20)
CALCIUM SERPL-MCNC: 8.7 MG/DL (ref 8.3–10.6)
CHLORIDE BLD-SCNC: 107 MMOL/L (ref 99–110)
CO2: 26 MMOL/L (ref 21–32)
CREAT SERPL-MCNC: 0.8 MG/DL (ref 0.6–1.2)
GFR AFRICAN AMERICAN: >60
GFR NON-AFRICAN AMERICAN: >60
GLUCOSE BLD-MCNC: 134 MG/DL (ref 70–99)
GLUCOSE BLD-MCNC: 162 MG/DL (ref 70–99)
GLUCOSE BLD-MCNC: 199 MG/DL (ref 70–99)
GLUCOSE BLD-MCNC: 213 MG/DL (ref 70–99)
GLUCOSE BLD-MCNC: 235 MG/DL (ref 70–99)
GLUCOSE BLD-MCNC: 279 MG/DL (ref 70–99)
GLUCOSE BLD-MCNC: 279 MG/DL (ref 70–99)
HCT VFR BLD CALC: 30.9 % (ref 36–48)
HEMOGLOBIN: 10.3 G/DL (ref 12–16)
MCH RBC QN AUTO: 28.3 PG (ref 26–34)
MCHC RBC AUTO-ENTMCNC: 33.3 G/DL (ref 31–36)
MCV RBC AUTO: 85 FL (ref 80–100)
PDW BLD-RTO: 14.7 % (ref 12.4–15.4)
PERFORMED ON: ABNORMAL
PHOSPHORUS: 4 MG/DL (ref 2.5–4.9)
PLATELET # BLD: 326 K/UL (ref 135–450)
PMV BLD AUTO: 7.5 FL (ref 5–10.5)
POTASSIUM SERPL-SCNC: 4 MMOL/L (ref 3.5–5.1)
RBC # BLD: 3.64 M/UL (ref 4–5.2)
SODIUM BLD-SCNC: 142 MMOL/L (ref 136–145)
WBC # BLD: 7.6 K/UL (ref 4–11)

## 2020-11-26 PROCEDURE — 6370000000 HC RX 637 (ALT 250 FOR IP): Performed by: INTERNAL MEDICINE

## 2020-11-26 PROCEDURE — 80069 RENAL FUNCTION PANEL: CPT

## 2020-11-26 PROCEDURE — 6360000002 HC RX W HCPCS: Performed by: INTERNAL MEDICINE

## 2020-11-26 PROCEDURE — 1200000000 HC SEMI PRIVATE

## 2020-11-26 PROCEDURE — 6360000002 HC RX W HCPCS: Performed by: NURSE PRACTITIONER

## 2020-11-26 PROCEDURE — 36415 COLL VENOUS BLD VENIPUNCTURE: CPT

## 2020-11-26 PROCEDURE — 85027 COMPLETE CBC AUTOMATED: CPT

## 2020-11-26 PROCEDURE — 6370000000 HC RX 637 (ALT 250 FOR IP): Performed by: NURSE PRACTITIONER

## 2020-11-26 RX ADMIN — GABAPENTIN 400 MG: 400 CAPSULE ORAL at 14:26

## 2020-11-26 RX ADMIN — BUSPIRONE HYDROCHLORIDE 10 MG: 5 TABLET ORAL at 21:07

## 2020-11-26 RX ADMIN — GABAPENTIN 400 MG: 400 CAPSULE ORAL at 08:38

## 2020-11-26 RX ADMIN — OXYCODONE HYDROCHLORIDE AND ACETAMINOPHEN 1 TABLET: 5; 325 TABLET ORAL at 11:23

## 2020-11-26 RX ADMIN — INSULIN LISPRO 12 UNITS: 100 INJECTION, SOLUTION INTRAVENOUS; SUBCUTANEOUS at 00:06

## 2020-11-26 RX ADMIN — BUSPIRONE HYDROCHLORIDE 10 MG: 5 TABLET ORAL at 14:26

## 2020-11-26 RX ADMIN — INSULIN GLARGINE 50 UNITS: 100 INJECTION, SOLUTION SUBCUTANEOUS at 10:22

## 2020-11-26 RX ADMIN — PANTOPRAZOLE SODIUM 40 MG: 40 TABLET, DELAYED RELEASE ORAL at 05:32

## 2020-11-26 RX ADMIN — BUSPIRONE HYDROCHLORIDE 10 MG: 5 TABLET ORAL at 08:38

## 2020-11-26 RX ADMIN — ASPIRIN 325 MG ORAL TABLET 325 MG: 325 PILL ORAL at 08:38

## 2020-11-26 RX ADMIN — FLUOXETINE 20 MG: 20 CAPSULE ORAL at 08:36

## 2020-11-26 RX ADMIN — LISINOPRIL 20 MG: 20 TABLET ORAL at 08:38

## 2020-11-26 RX ADMIN — INSULIN GLARGINE 50 UNITS: 100 INJECTION, SOLUTION SUBCUTANEOUS at 21:07

## 2020-11-26 RX ADMIN — INSULIN LISPRO 6 UNITS: 100 INJECTION, SOLUTION INTRAVENOUS; SUBCUTANEOUS at 03:32

## 2020-11-26 RX ADMIN — OXYCODONE HYDROCHLORIDE AND ACETAMINOPHEN 1 TABLET: 5; 325 TABLET ORAL at 05:37

## 2020-11-26 RX ADMIN — ONDANSETRON 4 MG: 2 INJECTION INTRAMUSCULAR; INTRAVENOUS at 11:23

## 2020-11-26 RX ADMIN — INSULIN LISPRO 5 UNITS: 100 INJECTION, SOLUTION INTRAVENOUS; SUBCUTANEOUS at 21:07

## 2020-11-26 RX ADMIN — GABAPENTIN 400 MG: 400 CAPSULE ORAL at 21:07

## 2020-11-26 RX ADMIN — OXYCODONE HYDROCHLORIDE AND ACETAMINOPHEN 1 TABLET: 5; 325 TABLET ORAL at 21:22

## 2020-11-26 RX ADMIN — INSULIN LISPRO 3 UNITS: 100 INJECTION, SOLUTION INTRAVENOUS; SUBCUTANEOUS at 11:24

## 2020-11-26 RX ADMIN — ATORVASTATIN CALCIUM 40 MG: 40 TABLET, FILM COATED ORAL at 08:39

## 2020-11-26 RX ADMIN — AMLODIPINE BESYLATE 10 MG: 5 TABLET ORAL at 08:37

## 2020-11-26 RX ADMIN — DIVALPROEX SODIUM 250 MG: 250 TABLET, DELAYED RELEASE ORAL at 21:07

## 2020-11-26 RX ADMIN — DEXAMETHASONE 6 MG: 4 TABLET ORAL at 08:38

## 2020-11-26 RX ADMIN — DIVALPROEX SODIUM 250 MG: 250 TABLET, DELAYED RELEASE ORAL at 08:36

## 2020-11-26 RX ADMIN — QUETIAPINE FUMARATE 300 MG: 100 TABLET ORAL at 21:07

## 2020-11-26 RX ADMIN — ENOXAPARIN SODIUM 40 MG: 40 INJECTION SUBCUTANEOUS at 08:36

## 2020-11-26 RX ADMIN — INSULIN LISPRO 9 UNITS: 100 INJECTION, SOLUTION INTRAVENOUS; SUBCUTANEOUS at 16:57

## 2020-11-26 ASSESSMENT — PAIN SCALES - GENERAL
PAINLEVEL_OUTOF10: 8
PAINLEVEL_OUTOF10: 7
PAINLEVEL_OUTOF10: 7

## 2020-11-26 NOTE — PROGRESS NOTES
Hospitalist Progress Note      PCP: George Nam DO,     Date of Admission: 11/19/2020    Chief Complaint: Ache/Cough/Fever    Subjective: Still SOB when she walks to the bathroom. BS elevated. Continues to ask for more food with meals. Medications:  Reviewed    Infusion Medications    dextrose 100 mL/hr (11/20/20 0200)     Scheduled Medications    insulin lispro  0-18 Units Subcutaneous Q4H    insulin glargine  50 Units Subcutaneous BID    lisinopril  20 mg Oral Daily    amLODIPine  10 mg Oral Daily    dexamethasone  6 mg Oral Daily    enoxaparin  40 mg Subcutaneous Daily    aspirin  325 mg Oral Daily    atorvastatin  40 mg Oral Daily    busPIRone  10 mg Oral TID    divalproex  250 mg Oral BID    FLUoxetine  20 mg Oral Daily    gabapentin  400 mg Oral TID    pantoprazole  40 mg Oral QAM AC    QUEtiapine  300 mg Oral Nightly    insulin lispro  0-9 Units Subcutaneous Nightly     PRN Meds: oxyCODONE-acetaminophen, acetaminophen, labetalol, ondansetron, glucose, dextrose, glucagon (rDNA), dextrose      Intake/Output Summary (Last 24 hours) at 11/26/2020 0953  Last data filed at 11/26/2020 4411  Gross per 24 hour   Intake 1700 ml   Output --   Net 1700 ml       Physical Exam Performed:    /80   Pulse 94   Temp 97.8 °F (36.6 °C) (Oral)   Resp 18   Ht 5' 2\" (1.575 m)   Wt 138 lb 14.2 oz (63 kg)   SpO2 93%   BMI 25.40 kg/m²     General appearance: No apparent distress, appears stated age and cooperative. HEENT: Pupils equal, round, and reactive to light. Conjunctivae/corneas clear. Neck: Supple, with full range of motion. No jugular venous distention. Trachea midline. Respiratory:  Rales BL  Cardiovascular: Regular rate and rhythm with normal S1/S2 without murmurs, rubs or gallops. Abdomen: Soft, non-tender, non-distended with normal bowel sounds. Musculoskeletal: No clubbing, cyanosis or edema bilaterally. Full range of motion without deformity.   Skin: Skin color, texture, turgor normal.  No rashes or lesions. Neurologic:  Neurovascularly intact without any focal sensory/motor deficits. Cranial nerves: II-XII intact, grossly non-focal.  Psychiatric: Alert and oriented, thought content appropriate, normal insight  Capillary Refill: Brisk,< 3 seconds   Peripheral Pulses: +2 palpable, equal bilaterally       Labs:   Recent Labs     11/26/20  0600   WBC 7.6   HGB 10.3*   HCT 30.9*        Recent Labs     11/26/20  0600      K 4.0      CO2 26   BUN 36*   CREATININE 0.8   CALCIUM 8.7   PHOS 4.0     No results for input(s): AST, ALT, BILIDIR, BILITOT, ALKPHOS in the last 72 hours. No results for input(s): INR in the last 72 hours. No results for input(s): Bud Hug in the last 72 hours. Urinalysis:      Lab Results   Component Value Date    NITRU POSITIVE 11/19/2020    WBCUA  11/19/2020    BACTERIA 1+ 11/19/2020    RBCUA 21-50 11/19/2020    BLOODU SMALL 11/19/2020    SPECGRAV 1.015 11/19/2020    GLUCOSEU >=1000 11/19/2020       Consults:    IP CONSULT TO HOSPITALIST      Assessment/Plan:    Active Hospital Problems    Diagnosis    COVID-19 [U07.1]    DKA, type 2, not at goal [facility]) [E11.10]       COVID 19 POSITIVE - Positive test OSH PTA on or about 17 Nov w/ sxs onset approx 15 Nov.  Started on Decadron - continued. Continue PO at discharge. Required max of 2L of O2 and now weaned off onto RA. PNA - likely viral 2nd to Covid 19. Not currently on ABX. DKA - initially requiring Insulin gtt, now titrated off. Started on Lantus and titrated up to higher dose BID on 21/23 Nov. Follow FSBS/SSI high regimen. Exacerbated by steroids.       HTN/CAD - w/ known CAD but no evidence of active signs/sxs of ischemia/failure. Currently uncontrolled on home meds w/ vitals reviewed and documented as above. Lisinopril dose increased and Norvasc started 23 Nov.     HyperLipidemia - controlled on home Statin.  Continue, w/ f/u and med adjustment w/ PCP    HypoNatremia - etiology clinically unable to determine but likely hypovolemic. Follow serial labs on IVF. Reviewed and documented as above. HypoKalemia - etiology clinically unable to determine. Follow serial labs and replace PRN. Reviewed and documented as above. GERD - w/out active signs/sxs of dysphagia/odynophagia. No evidence of active PUD or hx of GI bleed. Controlled on home PPI - continue. Anemia - etiology clinically unable to determine, w/out evidence of active bleeding/hemolysis. Asymptomatic w/out indication for transfusion. Follow serial labs. Reviewed and documented as above. Seizure: Continue medication    Bipolar: Continue medication    Insomnia: Continue medication         DVT Prophylaxis: LMWH  Diet: DIET CARB CONTROL;  Code Status: Prior      PT/OT Eval Status: seen w/ recs for SNF.      Dispo - Medically stable for discharge since Wed 25 Nov pending placement decision    Gabriela Dobson MD

## 2020-11-26 NOTE — PLAN OF CARE
Problem: Falls - Risk of:  Goal: Will remain free from falls  Description: Will remain free from falls  Outcome: Ongoing   Pt remains free from falls during this shift. Pt a/o and calls out appropriately. Bed in lowest position and wheels locked. Call light within reach. Bedside table within reach. Pt educated to call out if needing assistance. Chair alarm in place. Will continue to monitor.

## 2020-11-26 NOTE — PROGRESS NOTES
Pt assessment completed and charted. VSS. Pt's BP elevated, will monitor. Pt a/ox4. pt reports pain 7/10 in lower back, pt educated on prn pain meds, prn pain meds given per mar. Pt is Q4 BS, BS are still remaining high. Pt curently off of o2 and stating 93%. Pt denies any other needs at this time. Pt calls out appropriately. Will continue to monitor.        Pt is a fall risk;  -Bed in lowest position and wheels locked.    -Call light within reach.   -Bedside table within reach.   -Non-skid footwear in place.  -bed check in place

## 2020-11-27 LAB
GLUCOSE BLD-MCNC: 104 MG/DL (ref 70–99)
GLUCOSE BLD-MCNC: 135 MG/DL (ref 70–99)
GLUCOSE BLD-MCNC: 186 MG/DL (ref 70–99)
GLUCOSE BLD-MCNC: 206 MG/DL (ref 70–99)
GLUCOSE BLD-MCNC: 209 MG/DL (ref 70–99)
GLUCOSE BLD-MCNC: 289 MG/DL (ref 70–99)
PERFORMED ON: ABNORMAL

## 2020-11-27 PROCEDURE — 1200000000 HC SEMI PRIVATE

## 2020-11-27 PROCEDURE — 97535 SELF CARE MNGMENT TRAINING: CPT

## 2020-11-27 PROCEDURE — 6360000002 HC RX W HCPCS: Performed by: INTERNAL MEDICINE

## 2020-11-27 PROCEDURE — 6370000000 HC RX 637 (ALT 250 FOR IP): Performed by: INTERNAL MEDICINE

## 2020-11-27 PROCEDURE — 6360000002 HC RX W HCPCS: Performed by: NURSE PRACTITIONER

## 2020-11-27 PROCEDURE — 97530 THERAPEUTIC ACTIVITIES: CPT

## 2020-11-27 PROCEDURE — 6370000000 HC RX 637 (ALT 250 FOR IP): Performed by: NURSE PRACTITIONER

## 2020-11-27 RX ADMIN — INSULIN LISPRO 3 UNITS: 100 INJECTION, SOLUTION INTRAVENOUS; SUBCUTANEOUS at 16:39

## 2020-11-27 RX ADMIN — ASPIRIN 325 MG ORAL TABLET 325 MG: 325 PILL ORAL at 09:03

## 2020-11-27 RX ADMIN — FLUOXETINE 20 MG: 20 CAPSULE ORAL at 09:04

## 2020-11-27 RX ADMIN — OXYCODONE HYDROCHLORIDE AND ACETAMINOPHEN 1 TABLET: 5; 325 TABLET ORAL at 09:03

## 2020-11-27 RX ADMIN — OXYCODONE HYDROCHLORIDE AND ACETAMINOPHEN 1 TABLET: 5; 325 TABLET ORAL at 15:59

## 2020-11-27 RX ADMIN — AMLODIPINE BESYLATE 10 MG: 5 TABLET ORAL at 09:03

## 2020-11-27 RX ADMIN — ENOXAPARIN SODIUM 40 MG: 40 INJECTION SUBCUTANEOUS at 09:03

## 2020-11-27 RX ADMIN — OXYCODONE HYDROCHLORIDE AND ACETAMINOPHEN 1 TABLET: 5; 325 TABLET ORAL at 20:21

## 2020-11-27 RX ADMIN — ONDANSETRON 4 MG: 2 INJECTION INTRAMUSCULAR; INTRAVENOUS at 09:04

## 2020-11-27 RX ADMIN — INSULIN LISPRO 6 UNITS: 100 INJECTION, SOLUTION INTRAVENOUS; SUBCUTANEOUS at 00:30

## 2020-11-27 RX ADMIN — GABAPENTIN 400 MG: 400 CAPSULE ORAL at 09:03

## 2020-11-27 RX ADMIN — OXYCODONE HYDROCHLORIDE AND ACETAMINOPHEN 1 TABLET: 5; 325 TABLET ORAL at 03:17

## 2020-11-27 RX ADMIN — LISINOPRIL 20 MG: 20 TABLET ORAL at 09:03

## 2020-11-27 RX ADMIN — INSULIN GLARGINE 50 UNITS: 100 INJECTION, SOLUTION SUBCUTANEOUS at 20:21

## 2020-11-27 RX ADMIN — INSULIN LISPRO 3 UNITS: 100 INJECTION, SOLUTION INTRAVENOUS; SUBCUTANEOUS at 20:21

## 2020-11-27 RX ADMIN — BUSPIRONE HYDROCHLORIDE 10 MG: 5 TABLET ORAL at 20:21

## 2020-11-27 RX ADMIN — DEXAMETHASONE 6 MG: 4 TABLET ORAL at 09:03

## 2020-11-27 RX ADMIN — BUSPIRONE HYDROCHLORIDE 10 MG: 5 TABLET ORAL at 15:53

## 2020-11-27 RX ADMIN — GABAPENTIN 400 MG: 400 CAPSULE ORAL at 15:53

## 2020-11-27 RX ADMIN — INSULIN LISPRO 9 UNITS: 100 INJECTION, SOLUTION INTRAVENOUS; SUBCUTANEOUS at 23:58

## 2020-11-27 RX ADMIN — ATORVASTATIN CALCIUM 40 MG: 40 TABLET, FILM COATED ORAL at 09:04

## 2020-11-27 RX ADMIN — QUETIAPINE FUMARATE 300 MG: 100 TABLET ORAL at 20:21

## 2020-11-27 RX ADMIN — PANTOPRAZOLE SODIUM 40 MG: 40 TABLET, DELAYED RELEASE ORAL at 06:16

## 2020-11-27 RX ADMIN — GABAPENTIN 400 MG: 400 CAPSULE ORAL at 20:21

## 2020-11-27 RX ADMIN — BUSPIRONE HYDROCHLORIDE 10 MG: 5 TABLET ORAL at 09:03

## 2020-11-27 RX ADMIN — INSULIN GLARGINE 50 UNITS: 100 INJECTION, SOLUTION SUBCUTANEOUS at 09:09

## 2020-11-27 RX ADMIN — INSULIN LISPRO 6 UNITS: 100 INJECTION, SOLUTION INTRAVENOUS; SUBCUTANEOUS at 03:33

## 2020-11-27 RX ADMIN — DIVALPROEX SODIUM 250 MG: 250 TABLET, DELAYED RELEASE ORAL at 09:03

## 2020-11-27 RX ADMIN — DIVALPROEX SODIUM 250 MG: 250 TABLET, DELAYED RELEASE ORAL at 20:21

## 2020-11-27 ASSESSMENT — PAIN SCALES - GENERAL
PAINLEVEL_OUTOF10: 8

## 2020-11-27 ASSESSMENT — PAIN DESCRIPTION - PAIN TYPE
TYPE: CHRONIC PAIN
TYPE: CHRONIC PAIN

## 2020-11-27 ASSESSMENT — PAIN DESCRIPTION - LOCATION
LOCATION: BACK
LOCATION: BACK

## 2020-11-27 ASSESSMENT — PAIN - FUNCTIONAL ASSESSMENT: PAIN_FUNCTIONAL_ASSESSMENT: PREVENTS OR INTERFERES SOME ACTIVE ACTIVITIES AND ADLS

## 2020-11-27 NOTE — PROGRESS NOTES
Pt alert and oriented, VSS. Shift assessment completed and documented. Denies any needs at this time. Up to chair, alarm in place. Will continue to monitor.

## 2020-11-27 NOTE — CARE COORDINATION
Addendum to prior note: bed possibly available at both [facility] and [facility] but on non COVID floor; admissions at both facilities to review referral with DON and get back to writer with decision to accept. COVID unit is full but patient is at least 10 days past positive test with treatment and afebrile; so they will consider for other bed/non covid floor. Await decision to accept.      LG

## 2020-11-27 NOTE — PROGRESS NOTES
Pt A&O x4. VSS. Denies dyspnea or N/V at this time. Fine crackles in lower lobes bilat. Bowel sounds active. Red and blanching sacrum, mepalex in place. Assessment is as charted. CALL LIGHT & BEDSIDE TABLE WITHIN REACH, WHEELS LOCKED, BED IN LOWEST POSITION, PT INSTRUCTED TO CALL OUT FOR ASSISTANCE & EXPRESSED UNDERSTANDING, CALLS OUT APPROPRIATELY.     Electronically signed by Jonelle Boo RN on 11/27/2020 at 3:08 AM

## 2020-11-27 NOTE — PROGRESS NOTES
Reviewed: Yes  Patient assessed for rehabilitation services?: Yes  Family / Caregiver Present: No  Referring Practitioner: Carlos Culp MD  Diagnosis: COVID +  Subjective  Subjective: Pt pleasant and agreeable to therapy, reporting back pain  General Comment  Comments: pt resting in bed, agreeable to therapy initially  Pain Assessment  Pain Assessment: Faces  Pain Type: Chronic pain  Pain Location: Back  Functional Pain Assessment: Prevents or interferes some active activities and ADLs  Non-Pharmaceutical Pain Intervention(s): Ambulation/Increased Activity; Therapeutic presence;Repositioned  Pre Treatment Pain Screening  Intervention List: Patient able to continue with treatment  Vital Signs  Patient Currently in Pain: Yes   Orientation  Orientation  Overall Orientation Status: Within Functional Limits  Objective    ADL  Feeding: Independent  Grooming: Supervision(standing at sink to wash hands after toileting & brushing teeth)  LE Bathing: Supervision  UE Dressing: Minimal assistance  LE Dressing: Supervision(to manage doffing/donning briefs)  Toileting: Independent  Additional Comments: declined the need for additional ADLs; Patient with decreased effort to ADLs prior to requesting assistance.  Patient benefit from verbal encouragement to persist with ADLs        Balance  Sitting Balance: Independent  Standing Balance: Supervision(with RW)  Standing Balance  Time: 1-3 minutes x 3  Activity: bathroom mobility & standing for pericare  Functional Mobility  Functional - Mobility Device: Rolling Walker  Activity: To/from bathroom  Assist Level: Supervision  Toilet Transfers  Toilet - Technique: Ambulating(supervision with RW)  Equipment Used: Grab bars  Toilet Transfer: Supervision  Bed mobility  Supine to Sit: Modified independent(with HOB elevated & use of bed rail to Left)  Sit to Supine: Unable to assess(Left up in chair upon exiting, pt agreeable)  Transfers  Sit to stand: Supervision  Stand to sit: Supervision Cognition  Overall Cognitive Status: Exceptions  Arousal/Alertness: Delayed responses to stimuli  Following Commands: Follows one step commands with repetition; Follows one step commands with increased time  Attention Span: Attends with cues to redirect  Memory: Decreased recall of recent events  Safety Judgement: Decreased awareness of need for assistance  Problem Solving: Assistance required to implement solutions  Insights: Decreased awareness of deficits  Initiation: Requires cues for some  Sequencing: Requires cues for some        Plan   Plan  Times per week: 2-4x/ week  Current Treatment Recommendations: Patient/Caregiver Education & Training, Strengthening, Balance Training, Functional Mobility Training, Self-Care / ADL, Safety Education & Training, Endurance Training, Home Management Training    AM-PAC Score        AM-PAC Inpatient Daily Activity Raw Score: 19 (11/27/20 1527)  AM-PAC Inpatient ADL T-Scale Score : 40.22 (11/27/20 1527)  ADL Inpatient CMS 0-100% Score: 42.8 (11/27/20 1527)  ADL Inpatient CMS G-Code Modifier : CK (11/27/20 1527)    Goals  Short term goals  Time Frame for Short term goals: 1 week 11/30/20  Short term goal 1: Pt will complete toileting with mod I- 11/27 STG met  Short term goal 2: Pt will complete LB dressing with mod I-11/27 requires Supervision for sequencing  Short term goal 3: Pt will complete toilet transfers with mod I; 11/27 requires Supervision  Short term goal 4: Pt will complete bathroom mobility with mod I using LRAD in order to participate in ADLs -11/27 Supervision       Therapy Time   Individual Concurrent Group Co-treatment   Time In 1330         Time Out 1355         Minutes [address]

## 2020-11-27 NOTE — PROGRESS NOTES
Hospitalist Progress Note      PCP: Brayan Mckinnon DO, DO    Date of Admission: 11/19/2020    Chief Complaint: Ache/Cough/Fever    Subjective: [place] PO. Still SOB with exertion but improved from yesterday. Medications:  Reviewed    Infusion Medications    dextrose 100 mL/hr (11/20/20 0200)     Scheduled Medications    insulin lispro  0-18 Units Subcutaneous Q4H    insulin glargine  50 Units Subcutaneous BID    lisinopril  20 mg Oral Daily    amLODIPine  10 mg Oral Daily    dexamethasone  6 mg Oral Daily    enoxaparin  40 mg Subcutaneous Daily    aspirin  325 mg Oral Daily    atorvastatin  40 mg Oral Daily    busPIRone  10 mg Oral TID    divalproex  250 mg Oral BID    FLUoxetine  20 mg Oral Daily    gabapentin  400 mg Oral TID    pantoprazole  40 mg Oral QAM AC    QUEtiapine  300 mg Oral Nightly    insulin lispro  0-9 Units Subcutaneous Nightly     PRN Meds: oxyCODONE-acetaminophen, acetaminophen, labetalol, ondansetron, glucose, dextrose, glucagon (rDNA), dextrose      Intake/Output Summary (Last 24 hours) at 11/27/2020 1640  Last data filed at 11/27/2020 1554  Gross per 24 hour   Intake 924 ml   Output 500 ml   Net 424 ml       Physical Exam Performed:    /75   Pulse 101   Temp 98.1 °F (36.7 °C) (Oral)   Resp 18   Ht 5' 2\" (1.575 m)   Wt 138 lb 14.2 oz (63 kg)   SpO2 91%   BMI 25.40 kg/m²     General appearance: No apparent distress, appears stated age and cooperative. HEENT: Pupils equal, round, and reactive to light. Conjunctivae/corneas clear. Neck: Supple, with full range of motion. No jugular venous distention. Trachea midline. Respiratory:  Rales BL  Cardiovascular: Regular rate and rhythm with normal S1/S2 without murmurs, rubs or gallops. Abdomen: Soft, non-tender, non-distended with normal bowel sounds. Musculoskeletal: No clubbing, cyanosis or edema bilaterally. Full range of motion without deformity.   Skin: Skin color, texture, turgor normal.  No rashes or lesions. Neurologic:  Neurovascularly intact without any focal sensory/motor deficits. Cranial nerves: II-XII intact, grossly non-focal.  Psychiatric: Alert and oriented, thought content appropriate, normal insight  Capillary Refill: Brisk,< 3 seconds   Peripheral Pulses: +2 palpable, equal bilaterally       Labs:   Recent Labs     11/26/20  0600   WBC 7.6   HGB 10.3*   HCT 30.9*        Recent Labs     11/26/20  0600      K 4.0      CO2 26   BUN 36*   CREATININE 0.8   CALCIUM 8.7   PHOS 4.0     No results for input(s): AST, ALT, BILIDIR, BILITOT, ALKPHOS in the last 72 hours. No results for input(s): INR in the last 72 hours. No results for input(s): Paresh Clap in the last 72 hours. Urinalysis:      Lab Results   Component Value Date    NITRU POSITIVE 11/19/2020    WBCUA  11/19/2020    BACTERIA 1+ 11/19/2020    RBCUA 21-50 11/19/2020    BLOODU SMALL 11/19/2020    SPECGRAV 1.015 11/19/2020    GLUCOSEU >=1000 11/19/2020       Consults:    IP CONSULT TO HOSPITALIST      Assessment/Plan:    Active Hospital Problems    Diagnosis    COVID-19 [U07.1]    DKA, type 2, not at goal [facility]) [E11.10]       COVID 19 POSITIVE - Positive test OSH PTA on or about 17 Nov w/ sxs onset approx 15 Nov.  Started on Decadron - continued. Continue PO at discharge. Required max of 2L of O2 and now weaned off onto RA. PNA - likely viral 2nd to Covid 19. Not currently on ABX. DKA - initially requiring Insulin gtt, now titrated off. Started on Lantus and titrated up to higher dose BID on 21/23 Nov. Follow FSBS/SSI high regimen. Exacerbated by steroids.       HTN/CAD - w/ known CAD but no evidence of active signs/sxs of ischemia/failure. Currently uncontrolled on home meds w/ vitals reviewed and documented as above. Lisinopril dose increased and Norvasc started 23 Nov.     HyperLipidemia - controlled on home Statin.  Continue, w/ f/u and med adjustment w/ PCP    HypoNatremia - etiology clinically unable to determine but likely hypovolemic. Follow serial labs on IVF. Reviewed and documented as above. HypoKalemia - etiology clinically unable to determine. Follow serial labs and replace PRN. Reviewed and documented as above. GERD - w/out active signs/sxs of dysphagia/odynophagia. No evidence of active PUD or hx of GI bleed. Controlled on home PPI - continue. Anemia - etiology clinically unable to determine, w/out evidence of active bleeding/hemolysis. Asymptomatic w/out indication for transfusion. Follow serial labs. Reviewed and documented as above. Seizure: Continue medication    Bipolar: Continue medication    Insomnia: Continue medication         DVT Prophylaxis: LMWH  Diet: DIET CARB CONTROL;  Code Status: Prior      PT/OT Eval Status: seen w/ recs for SNF. Dispo - Medically stable for discharge since Wed 25 Nov pending placement decision. Re-eval by PT/OT on 11/27. Still recommendation is SNF.     Leslie Burnetet MD

## 2020-11-27 NOTE — CARE COORDINATION
Spoke with patient who continues to state need to plan for SNF at [facility]. Referral to [facility], however, admissions states full and bed not expected until mid next week. Call to [phone]; no bed available on COVID unit but admissions states bed available on skilled unit and if patient 10 days out from positive test, with treatment and afebrile, possible to accept on med floor. Referral to [facility]; await review and decision to accept. Patient is precert with [employer]; once SNF confirmed, will initiate precert.      Reina Duarte MSW LSW

## 2020-11-27 NOTE — CARE COORDINATION
Call to [facility] to inquire about bed availability/status for COVID unit; await return call.      Doris Solorzano MSW LSW

## 2020-11-28 LAB
GLUCOSE BLD-MCNC: 118 MG/DL (ref 70–99)
GLUCOSE BLD-MCNC: 132 MG/DL (ref 70–99)
GLUCOSE BLD-MCNC: 139 MG/DL (ref 70–99)
GLUCOSE BLD-MCNC: 236 MG/DL (ref 70–99)
GLUCOSE BLD-MCNC: 275 MG/DL (ref 70–99)
PERFORMED ON: ABNORMAL

## 2020-11-28 PROCEDURE — 6370000000 HC RX 637 (ALT 250 FOR IP): Performed by: INTERNAL MEDICINE

## 2020-11-28 PROCEDURE — 6360000002 HC RX W HCPCS: Performed by: NURSE PRACTITIONER

## 2020-11-28 PROCEDURE — 6360000002 HC RX W HCPCS: Performed by: INTERNAL MEDICINE

## 2020-11-28 PROCEDURE — 97110 THERAPEUTIC EXERCISES: CPT

## 2020-11-28 PROCEDURE — 1200000000 HC SEMI PRIVATE

## 2020-11-28 PROCEDURE — 97116 GAIT TRAINING THERAPY: CPT

## 2020-11-28 RX ADMIN — QUETIAPINE FUMARATE 300 MG: 100 TABLET ORAL at 21:10

## 2020-11-28 RX ADMIN — DIVALPROEX SODIUM 250 MG: 250 TABLET, DELAYED RELEASE ORAL at 08:38

## 2020-11-28 RX ADMIN — FLUOXETINE 20 MG: 20 CAPSULE ORAL at 11:38

## 2020-11-28 RX ADMIN — LISINOPRIL 20 MG: 20 TABLET ORAL at 08:38

## 2020-11-28 RX ADMIN — ATORVASTATIN CALCIUM 40 MG: 40 TABLET, FILM COATED ORAL at 08:37

## 2020-11-28 RX ADMIN — DIVALPROEX SODIUM 250 MG: 250 TABLET, DELAYED RELEASE ORAL at 21:11

## 2020-11-28 RX ADMIN — AMLODIPINE BESYLATE 10 MG: 5 TABLET ORAL at 08:37

## 2020-11-28 RX ADMIN — ASPIRIN 325 MG ORAL TABLET 325 MG: 325 PILL ORAL at 08:37

## 2020-11-28 RX ADMIN — GABAPENTIN 400 MG: 400 CAPSULE ORAL at 21:11

## 2020-11-28 RX ADMIN — PANTOPRAZOLE SODIUM 40 MG: 40 TABLET, DELAYED RELEASE ORAL at 05:19

## 2020-11-28 RX ADMIN — OXYCODONE HYDROCHLORIDE AND ACETAMINOPHEN 1 TABLET: 5; 325 TABLET ORAL at 18:05

## 2020-11-28 RX ADMIN — OXYCODONE HYDROCHLORIDE AND ACETAMINOPHEN 1 TABLET: 5; 325 TABLET ORAL at 08:48

## 2020-11-28 RX ADMIN — GABAPENTIN 400 MG: 400 CAPSULE ORAL at 14:27

## 2020-11-28 RX ADMIN — INSULIN GLARGINE 50 UNITS: 100 INJECTION, SOLUTION SUBCUTANEOUS at 08:38

## 2020-11-28 RX ADMIN — OXYCODONE HYDROCHLORIDE AND ACETAMINOPHEN 1 TABLET: 5; 325 TABLET ORAL at 13:11

## 2020-11-28 RX ADMIN — GABAPENTIN 400 MG: 400 CAPSULE ORAL at 08:38

## 2020-11-28 RX ADMIN — BUSPIRONE HYDROCHLORIDE 10 MG: 5 TABLET ORAL at 08:38

## 2020-11-28 RX ADMIN — ENOXAPARIN SODIUM 40 MG: 40 INJECTION SUBCUTANEOUS at 08:42

## 2020-11-28 RX ADMIN — INSULIN LISPRO 6 UNITS: 100 INJECTION, SOLUTION INTRAVENOUS; SUBCUTANEOUS at 16:40

## 2020-11-28 RX ADMIN — INSULIN LISPRO 9 UNITS: 100 INJECTION, SOLUTION INTRAVENOUS; SUBCUTANEOUS at 21:11

## 2020-11-28 RX ADMIN — OXYCODONE HYDROCHLORIDE AND ACETAMINOPHEN 1 TABLET: 5; 325 TABLET ORAL at 22:18

## 2020-11-28 RX ADMIN — BUSPIRONE HYDROCHLORIDE 10 MG: 5 TABLET ORAL at 21:11

## 2020-11-28 RX ADMIN — BUSPIRONE HYDROCHLORIDE 10 MG: 5 TABLET ORAL at 14:27

## 2020-11-28 RX ADMIN — INSULIN GLARGINE 50 UNITS: 100 INJECTION, SOLUTION SUBCUTANEOUS at 21:11

## 2020-11-28 RX ADMIN — DEXAMETHASONE 6 MG: 4 TABLET ORAL at 08:37

## 2020-11-28 ASSESSMENT — PAIN SCALES - GENERAL
PAINLEVEL_OUTOF10: 6
PAINLEVEL_OUTOF10: 8
PAINLEVEL_OUTOF10: 6
PAINLEVEL_OUTOF10: 6
PAINLEVEL_OUTOF10: 8

## 2020-11-28 NOTE — PROGRESS NOTES
Hospitalist Progress Note      PCP: Kevin Day DO,     Date of Admission: 11/19/2020    Chief Complaint: Ache/Cough/Fever    Subjective: [place] PO. Still SOB with exertion. No new events. Awaiting placement. Medications:  Reviewed    Infusion Medications    dextrose 100 mL/hr (11/20/20 0200)     Scheduled Medications    insulin lispro  0-18 Units Subcutaneous Q4H    insulin glargine  50 Units Subcutaneous BID    lisinopril  20 mg Oral Daily    amLODIPine  10 mg Oral Daily    dexamethasone  6 mg Oral Daily    enoxaparin  40 mg Subcutaneous Daily    aspirin  325 mg Oral Daily    atorvastatin  40 mg Oral Daily    busPIRone  10 mg Oral TID    divalproex  250 mg Oral BID    FLUoxetine  20 mg Oral Daily    gabapentin  400 mg Oral TID    pantoprazole  40 mg Oral QAM AC    QUEtiapine  300 mg Oral Nightly    insulin lispro  0-9 Units Subcutaneous Nightly     PRN Meds: oxyCODONE-acetaminophen, acetaminophen, labetalol, ondansetron, glucose, dextrose, glucagon (rDNA), dextrose      Intake/Output Summary (Last 24 hours) at 11/28/2020 0838  Last data filed at 11/28/2020 0737  Gross per 24 hour   Intake 480 ml   Output 1200 ml   Net -720 ml       Physical Exam Performed:    /72   Pulse 99   Temp 98.5 °F (36.9 °C) (Oral)   Resp 16   Ht 5' 2\" (1.575 m)   Wt 138 lb 14.2 oz (63 kg)   SpO2 92%   BMI 25.40 kg/m²     General appearance: No apparent distress, appears stated age and cooperative. HEENT: Pupils equal, round, and reactive to light. Conjunctivae/corneas clear. Neck: Supple, with full range of motion. No jugular venous distention. Trachea midline. Respiratory:  Rales BL  Cardiovascular: Regular rate and rhythm with normal S1/S2 without murmurs, rubs or gallops. Abdomen: Soft, non-tender, non-distended with normal bowel sounds. Musculoskeletal: No clubbing, cyanosis or edema bilaterally. Full range of motion without deformity.   Skin: Skin color, texture, turgor normal.  No rashes or lesions. Neurologic:  Neurovascularly intact without any focal sensory/motor deficits. Cranial nerves: II-XII intact, grossly non-focal.  Psychiatric: Alert and oriented, thought content appropriate, normal insight  Capillary Refill: Brisk,< 3 seconds   Peripheral Pulses: +2 palpable, equal bilaterally       Labs:   Recent Labs     11/26/20  0600   WBC 7.6   HGB 10.3*   HCT 30.9*        Recent Labs     11/26/20  0600      K 4.0      CO2 26   BUN 36*   CREATININE 0.8   CALCIUM 8.7   PHOS 4.0     No results for input(s): AST, ALT, BILIDIR, BILITOT, ALKPHOS in the last 72 hours. No results for input(s): INR in the last 72 hours. No results for input(s): Shubham Dec in the last 72 hours. Urinalysis:      Lab Results   Component Value Date    NITRU POSITIVE 11/19/2020    WBCUA  11/19/2020    BACTERIA 1+ 11/19/2020    RBCUA 21-50 11/19/2020    BLOODU SMALL 11/19/2020    SPECGRAV 1.015 11/19/2020    GLUCOSEU >=1000 11/19/2020       Consults:    IP CONSULT TO HOSPITALIST      Assessment/Plan:    Active Hospital Problems    Diagnosis    COVID-19 [U07.1]    DKA, type 2, not at goal [facility]) [E11.10]       COVID 19 POSITIVE - Positive test OSH PTA on or about 17 Nov w/ sxs onset approx 15 Nov.  Started on Decadron - continued. Continue PO at discharge. Required max of 2L of O2 and now weaned off onto RA. PNA - likely viral 2nd to Covid 19. Not currently on ABX. DKA - initially requiring Insulin gtt, now titrated off. Started on Lantus and titrated up to higher dose BID on 21/23 Nov. Follow FSBS/SSI high regimen. Exacerbated by steroids.       HTN/CAD - w/ known CAD but no evidence of active signs/sxs of ischemia/failure. Currently uncontrolled on home meds w/ vitals reviewed and documented as above. Lisinopril dose increased and Norvasc started 23 Nov.     HyperLipidemia - controlled on home Statin.  Continue, w/ f/u and med adjustment w/ PCP    HypoNatremia - etiology clinically unable to determine but likely hypovolemic. Follow serial labs on IVF. Reviewed and documented as above. HypoKalemia - etiology clinically unable to determine. Follow serial labs and replace PRN. Reviewed and documented as above. GERD - w/out active signs/sxs of dysphagia/odynophagia. No evidence of active PUD or hx of GI bleed. Controlled on home PPI - continue. Anemia - etiology clinically unable to determine, w/out evidence of active bleeding/hemolysis. Asymptomatic w/out indication for transfusion. Follow serial labs. Reviewed and documented as above. Seizure: Continue medication    Bipolar: Continue medication    Insomnia: Continue medication         DVT Prophylaxis: LMWH  Diet: DIET CARB CONTROL;  Code Status: Prior      PT/OT Eval Status: seen w/ recs for SNF. Dispo - Medically stable for discharge since Wed 25 Nov pending placement decision. Re-eval by PT/OT on 11/27. Still recommendation is SNF. Awaiting bed availability.     Syeda Galloway MD

## 2020-11-28 NOTE — PROGRESS NOTES
Physical Therapy  Facility/Department: [facility] C3 TELE/MED SURG/ONC  Daily Treatment Note  NAME: Andreina Morel  : 1955  MRN: [id number]    Date of Service: 2020    Discharge Recommendations:  Subacute/Skilled Nursing Facility   PT Equipment Recommendations  Equipment Needed: No  Other: Defer to facility  If pt is unable to be seen after this session, please let this note serve as discharge summary. Please see case management note for discharge disposition. Thank you. Barriers to home discharge:   [x] Steps to access home entry or bed/bath: 16 ANNA MARIE   [x] Reported available assist at home upon discharge limited: home alone during daytime   [x] Patient or family requests DC to other than home    [x] Other: Patient reports that she has fallen at least 10 times in the past month. Assessment   Body structures, Functions, Activity limitations: Decreased functional mobility ; Decreased strength;Decreased balance;Decreased endurance;Decreased high-level IADLs;Decreased posture;Decreased ADL status  Assessment: Patient is progressing slowly with her therapy goals. Today patient needed CGA/min assist for transfers and CGA to ambulate 10 feet with RW. However patient does not utilize a RW at baseline and today she needed min assist to ambulate 10 feet without an assistive device. Patient also expressed concern that she has no assistance available during the daytime and that she cannot currently climb the 16 steps needed in order to enter her apartment. Patient presented with the therapy deficits listed above. Patient's mobility in particular was limited by significant fatigue. PT continues to recommend that this patient receive skilled PT in the SNF setting, when medically stable, in order to address these deficits and to help her maximize her safety and independence with all functional mobility. PT to continue to follow.   Treatment Diagnosis: Impaired functional mobility  Prognosis: Good  Decision Making: Medium Complexity  PT Education: Goals;PT Role;Plan of Care;Precautions;Gait Training;General Safety; Energy Conservation;Transfer Training;Functional Mobility Training;Disease Specific Education  Patient Education: Pt educated regarding safe activity pacing and cues for breathing technique - verbalized understanding  REQUIRES PT FOLLOW UP: Yes  Activity Tolerance  Activity Tolerance: Patient Tolerated treatment well;Patient limited by fatigue  Activity Tolerance: Vitals pre activity: 143/72 94% room air. 94 BPM. Post activity: 149/84 89% room air. increased to 91% after 30 second seated rest break. 97 BPM     Patient Diagnosis(es): The primary encounter diagnosis was 2019 novel coronavirus-infected pneumonia (NCIP). Diagnoses of Diabetic ketoacidosis without coma associated with type 2 diabetes mellitus ([facility] [facility] Acute cystitis with hematuria, and Elevated blood pressure reading were also pertinent to this visit. has a past medical history of [facility] affective [facility] ([facility] Coronary artery disease involving native coronary artery of native heart without angina pectoris, COVID-19, Depression, Diabetes ([facility] Hyperlipidemia, Hypertension, Insomnia, and Neuropathy. has a past surgical history that includes Cholecystectomy. Restrictions  Restrictions/Precautions  Restrictions/Precautions: Isolation, Contact Precautions  Position Activity Restriction  Other position/activity restrictions: Up with Assist; COVID +. Droplet +  Subjective   General  Chart Reviewed: Yes  Response To Previous Treatment: Patient with no complaints from previous session. Family / Caregiver Present: No  Subjective  Subjective: Patient seated in chair upon arrival. Patient agreed to participate.   General Comment  Comments: RN cleared pt for therapy  Pain Screening  Patient Currently in Pain: Denies  Pain Assessment  Response to Pain Intervention: Patient Satisfied  Vital Signs  Patient Currently in Pain: Denies Orientation  Orientation  Overall Orientation Status: Within Normal Limits  Cognition   Cognition  Overall Cognitive Status: Exceptions  Arousal/Alertness: Delayed responses to stimuli  Following Commands: Follows one step commands with repetition; Follows one step commands with increased time  Attention Span: Attends with cues to redirect  Memory: Decreased recall of recent events  Safety Judgement: Decreased awareness of need for assistance  Problem Solving: Assistance required to implement solutions  Insights: Decreased awareness of deficits  Initiation: Requires cues for some  Sequencing: Requires cues for some  Objective   Bed mobility  Rolling to Left: Unable to assess  Rolling to Right: Unable to assess  Supine to Sit: Unable to assess  Sit to Supine: Unable to assess  Comment: patient seated in chair at beginning and end of session. Transfers  Sit to Stand: Contact guard assistance  Stand to sit: Minimal Assistance(poor eccentric control on stand to sit transfer requiring min assist to help smooth descent into chair.)  Bed to Chair: Contact guard assistance  Comment: with RW  Ambulation  Ambulation?: Yes  More Ambulation?: Yes  Ambulation 1  Surface: level tile  Device: Rolling Walker  Assistance: Contact guard assistance  Quality of Gait: very slow gait pattern. mildly unsteady but no loss of balance. narrow base of support. cueing for RW placement. bilateral foot drag. cueing to increase bilateral step height. Gait Deviations: Slow Breana;Decreased step length;Decreased step height  Distance: 10 feet. 10 feet. (total distance limited by fatigue and fear of falling)  Comments: No complaints of shortness of breath, chest pain or dizziness. SpO2 90-92% after ambulation  Ambulation 2  Surface - 2: level tile  Device 2: No device(hand hold assistance (since patient does not utilize RW at baseline))  Assistance 2: Minimal assistance  Quality of Gait 2: 1 loss of balance, min assist to correct.  very short steps, decreased step height, cueing to correct. narrow base of support. cueing to correct. Gait Deviations: Slow Breana;Decreased step length;Decreased step height  Distance: 10 feet. (total distance limited by fatigue and fear of falling)  Comments: No complaints of shortness of breath, chest pain or dizziness. SpO2 90-92% after ambulation on room air. Stairs/Curb  Stairs?: No     Balance  Posture: Fair  Sitting - Static: Good  Sitting - Dynamic: Good  Standing - Static: Fair  Standing - Dynamic: Fair  Comments: with RW  Exercises  Hip Flexion: 1 x 10  Hip Abduction: 1 x 10  Knee Long Arc Quad: 1 x 10  Ankle Pumps: 1 x 10  Comments: cueing for sequencing and technique. Other exercises  Other exercises?: No                     AM-PAC Score     AM-PAC Inpatient Mobility without Stair Climbing Raw Score : 15 (11/28/20 1623)  AM-PAC Inpatient without Stair Climbing T-Scale Score : 43.03 (11/28/20 1623)  Mobility Inpatient CMS 0-100% Score: 47.43 (11/28/20 1623)  Mobility Inpatient without Stair CMS G-Code Modifier : CK (11/28/20 1623)       Goals  Short term goals  Time Frame for Short term goals: 1 week, 11/30/20 unless otherwise noted  Short term goal 1: Pt will perform sit<>stand transfers with mod I - 11/28 min assist  Short term goal 2: Pt will ambulate 50ft with LRAD and supervision - 11/28: 10ft, RW, CGA with RW, min assist with no assistive device  Short term goal 3: Pt will ascend/descend 5 steps to begin stair training for entry into apartment with BUE support. - 11/28 not attempted. patient in isolation. Short term goal 4: By 11/26, pt will tolerate x12-15 reps BLE exercise to assist with functional transfers with Spo2 remaining >90% - 11/28 progressing. Patient Goals   Patient goals : \"To walk on my own. \"    Plan    Plan  Times per week: 3-5x/wk  Times per day: Daily  Current Treatment Recommendations: Strengthening, Balance Training, Functional Mobility Training, Transfer Training, Endurance Training, Gait Training, Safety Education & Training, Patient/Caregiver Education & Training  Safety Devices  Type of devices:  All fall risk precautions in place, Call light within reach, Chair alarm in place, Gait belt, Left in chair, Patient at risk for falls  Restraints  Initially in place: No     Therapy Time   Individual Concurrent Group Co-treatment   Time In 1539         Time Out 1605         Minutes 26         Timed Code Treatment Minutes: 1110 Simon Mace, PT

## 2020-11-28 NOTE — PROGRESS NOTES
Pt A&O x4. VSS. Denies dyspnea or N/V at this time. Fine crackles in bilat lower lobes. Reports passing flatus. Assessment is as charted. CALL LIGHT & BEDSIDE TABLE WITHIN REACH, WHEELS LOCKED, BED IN LOWEST POSITION, BED ALARM ON, PT INSTRUCTED TO CALL OUT FOR ASSISTANCE & EXPRESSED UNDERSTANDING, CALLS OUT APPROPRIATELY. Will continue to monitor.      Electronically signed by Zelda Palacios RN on 11/28/2020 at 1:32 AM

## 2020-11-28 NOTE — PROGRESS NOTES
Pt a/o; vss; assessment complete and charted; c/o chronic pain, see MAR; call light in reach; will monitor

## 2020-11-29 LAB
GLUCOSE BLD-MCNC: 125 MG/DL (ref 70–99)
GLUCOSE BLD-MCNC: 183 MG/DL (ref 70–99)
GLUCOSE BLD-MCNC: 219 MG/DL (ref 70–99)
GLUCOSE BLD-MCNC: 222 MG/DL (ref 70–99)
GLUCOSE BLD-MCNC: 247 MG/DL (ref 70–99)
GLUCOSE BLD-MCNC: 358 MG/DL (ref 70–99)
PERFORMED ON: ABNORMAL

## 2020-11-29 PROCEDURE — 6360000002 HC RX W HCPCS: Performed by: NURSE PRACTITIONER

## 2020-11-29 PROCEDURE — 6370000000 HC RX 637 (ALT 250 FOR IP): Performed by: NURSE PRACTITIONER

## 2020-11-29 PROCEDURE — 6370000000 HC RX 637 (ALT 250 FOR IP): Performed by: INTERNAL MEDICINE

## 2020-11-29 PROCEDURE — 97116 GAIT TRAINING THERAPY: CPT

## 2020-11-29 PROCEDURE — 1200000000 HC SEMI PRIVATE

## 2020-11-29 PROCEDURE — 6360000002 HC RX W HCPCS: Performed by: INTERNAL MEDICINE

## 2020-11-29 PROCEDURE — 97110 THERAPEUTIC EXERCISES: CPT

## 2020-11-29 RX ADMIN — INSULIN LISPRO 6 UNITS: 100 INJECTION, SOLUTION INTRAVENOUS; SUBCUTANEOUS at 11:28

## 2020-11-29 RX ADMIN — DIVALPROEX SODIUM 250 MG: 250 TABLET, DELAYED RELEASE ORAL at 08:30

## 2020-11-29 RX ADMIN — INSULIN GLARGINE 50 UNITS: 100 INJECTION, SOLUTION SUBCUTANEOUS at 08:22

## 2020-11-29 RX ADMIN — OXYCODONE HYDROCHLORIDE AND ACETAMINOPHEN 1 TABLET: 5; 325 TABLET ORAL at 20:24

## 2020-11-29 RX ADMIN — INSULIN LISPRO 15 UNITS: 100 INJECTION, SOLUTION INTRAVENOUS; SUBCUTANEOUS at 00:30

## 2020-11-29 RX ADMIN — ASPIRIN 325 MG ORAL TABLET 325 MG: 325 PILL ORAL at 08:30

## 2020-11-29 RX ADMIN — BUSPIRONE HYDROCHLORIDE 10 MG: 5 TABLET ORAL at 13:49

## 2020-11-29 RX ADMIN — ATORVASTATIN CALCIUM 40 MG: 40 TABLET, FILM COATED ORAL at 08:29

## 2020-11-29 RX ADMIN — OXYCODONE HYDROCHLORIDE AND ACETAMINOPHEN 1 TABLET: 5; 325 TABLET ORAL at 08:36

## 2020-11-29 RX ADMIN — OXYCODONE HYDROCHLORIDE AND ACETAMINOPHEN 1 TABLET: 5; 325 TABLET ORAL at 13:49

## 2020-11-29 RX ADMIN — DIVALPROEX SODIUM 250 MG: 250 TABLET, DELAYED RELEASE ORAL at 20:24

## 2020-11-29 RX ADMIN — ENOXAPARIN SODIUM 40 MG: 40 INJECTION SUBCUTANEOUS at 08:30

## 2020-11-29 RX ADMIN — BUSPIRONE HYDROCHLORIDE 10 MG: 5 TABLET ORAL at 08:30

## 2020-11-29 RX ADMIN — GABAPENTIN 400 MG: 400 CAPSULE ORAL at 13:49

## 2020-11-29 RX ADMIN — AMLODIPINE BESYLATE 10 MG: 5 TABLET ORAL at 08:30

## 2020-11-29 RX ADMIN — INSULIN LISPRO 6 UNITS: 100 INJECTION, SOLUTION INTRAVENOUS; SUBCUTANEOUS at 16:34

## 2020-11-29 RX ADMIN — INSULIN LISPRO 3 UNITS: 100 INJECTION, SOLUTION INTRAVENOUS; SUBCUTANEOUS at 20:23

## 2020-11-29 RX ADMIN — FLUOXETINE 20 MG: 20 CAPSULE ORAL at 08:30

## 2020-11-29 RX ADMIN — QUETIAPINE FUMARATE 300 MG: 100 TABLET ORAL at 20:24

## 2020-11-29 RX ADMIN — GABAPENTIN 400 MG: 400 CAPSULE ORAL at 20:24

## 2020-11-29 RX ADMIN — OXYCODONE HYDROCHLORIDE AND ACETAMINOPHEN 1 TABLET: 5; 325 TABLET ORAL at 03:50

## 2020-11-29 RX ADMIN — PANTOPRAZOLE SODIUM 40 MG: 40 TABLET, DELAYED RELEASE ORAL at 06:11

## 2020-11-29 RX ADMIN — BUSPIRONE HYDROCHLORIDE 10 MG: 5 TABLET ORAL at 20:24

## 2020-11-29 RX ADMIN — INSULIN GLARGINE 50 UNITS: 100 INJECTION, SOLUTION SUBCUTANEOUS at 20:23

## 2020-11-29 RX ADMIN — INSULIN LISPRO 3 UNITS: 100 INJECTION, SOLUTION INTRAVENOUS; SUBCUTANEOUS at 03:53

## 2020-11-29 RX ADMIN — GABAPENTIN 400 MG: 400 CAPSULE ORAL at 08:30

## 2020-11-29 RX ADMIN — LISINOPRIL 20 MG: 20 TABLET ORAL at 08:30

## 2020-11-29 RX ADMIN — DEXAMETHASONE 6 MG: 4 TABLET ORAL at 08:30

## 2020-11-29 ASSESSMENT — PAIN SCALES - GENERAL
PAINLEVEL_OUTOF10: 8
PAINLEVEL_OUTOF10: 9
PAINLEVEL_OUTOF10: 8
PAINLEVEL_OUTOF10: 8
PAINLEVEL_OUTOF10: 6

## 2020-11-29 ASSESSMENT — PAIN DESCRIPTION - LOCATION: LOCATION: HEAD;BACK

## 2020-11-29 ASSESSMENT — PAIN DESCRIPTION - PAIN TYPE: TYPE: CHRONIC PAIN

## 2020-11-29 ASSESSMENT — PAIN DESCRIPTION - DESCRIPTORS: DESCRIPTORS: ACHING

## 2020-11-29 NOTE — PROGRESS NOTES
Hospitalist Progress Note      PCP: Tiny Dial, DO, DO    Date of Admission: 11/19/2020    Chief Complaint: Ache/Cough/Fever    Subjective: [place] PO. Still SOB with exertion but improved from yesterday. Patient seen sleeping in chair at bedside. Discussed with RN regarding care plan. Medications:  Reviewed    Infusion Medications    dextrose 100 mL/hr (11/20/20 0200)     Scheduled Medications    insulin lispro  0-18 Units Subcutaneous Q4H    insulin glargine  50 Units Subcutaneous BID    lisinopril  20 mg Oral Daily    amLODIPine  10 mg Oral Daily    dexamethasone  6 mg Oral Daily    enoxaparin  40 mg Subcutaneous Daily    aspirin  325 mg Oral Daily    atorvastatin  40 mg Oral Daily    busPIRone  10 mg Oral TID    divalproex  250 mg Oral BID    FLUoxetine  20 mg Oral Daily    gabapentin  400 mg Oral TID    pantoprazole  40 mg Oral QAM AC    QUEtiapine  300 mg Oral Nightly    insulin lispro  0-9 Units Subcutaneous Nightly     PRN Meds: oxyCODONE-acetaminophen, acetaminophen, labetalol, ondansetron, glucose, dextrose, glucagon (rDNA), dextrose      Intake/Output Summary (Last 24 hours) at 11/29/2020 1536  Last data filed at 11/29/2020 1450  Gross per 24 hour   Intake 1960 ml   Output --   Net 1960 ml       Physical Exam Performed:    /74   Pulse 102   Temp 98.7 °F (37.1 °C) (Oral)   Resp 16   Ht 5' 2\" (1.575 m)   Wt 138 lb 14.2 oz (63 kg)   SpO2 93%   BMI 25.40 kg/m²     I performed an audiovisual assessment, based on new provisions and guidance offered by [facility] on March 18, 2020 in setting of COVID-19 outbreak and in order to preserve personal protective equipment in accordance with the flexibilities announced by CMS on March 30, 2020. References:   https://med. [place].gov/Portals/0/Resources/COVID-19/3_18%20Telemed%20Guidance%20Updated%20March%2018. pdf?ksk=[email or web address] - w/out active signs/sxs of dysphagia/odynophagia. No evidence of active PUD or hx of GI bleed. Controlled on home PPI - continue. Anemia - etiology clinically unable to determine, w/out evidence of active bleeding/hemolysis. Asymptomatic w/out indication for transfusion. Follow serial labs. Reviewed and documented as above. Seizure: Continue medication    Bipolar: Continue medication    Insomnia: Continue medication         DVT Prophylaxis: LMWH  Diet: DIET CARB CONTROL;  Code Status: Prior      PT/OT Eval Status: seen w/ recs for SNF. Dispo - Medically stable for discharge since Wed 25 Nov pending placement decision. Re-eval by PT/OT on 11/27. Still recommendation is SNF.   Currently [facility] bed not available     Loman Habermann, MD

## 2020-11-29 NOTE — PROGRESS NOTES
Pt A&O x4. VSS. Denies dyspnea or N/V at this time. Crackles auscultated in lower lobes bilat. Pt educated to cough and deep breath. Reports passing flatus. Assessment is as charted. CALL LIGHT & BEDSIDE TABLE WITHIN REACH, WHEELS LOCKED, BED IN LOWEST POSITION, PT INSTRUCTED TO CALL OUT FOR ASSISTANCE & EXPRESSED UNDERSTANDING, CALLS OUT APPROPRIATELY. Will continue to monitor.      Electronically signed by Jonelle Boo RN on 11/28/2020 at 10:56 PM

## 2020-11-29 NOTE — CARE COORDINATION
Janelle Huffman reviewing for possible admission today to non covid unit as pt > 10 days out from dx (+11/16) and afebrile. Addendum: Per Shivam Bravo pt will need further review from DON to ok admit to non covid unit tomorrow and new PT/OT note tomorrow if potentially F2S admit. Message left for [facility] as to status of their review of potential non covid admit.

## 2020-11-29 NOTE — PROGRESS NOTES
Physical Therapy  Facility/Department: [facility] C3 TELE/MED SURG/ONC  Daily Treatment Note  NAME: Sydni Perales  : 1955  MRN: [id number]    Date of Service: 2020    Discharge Recommendations:  Subacute/Skilled Nursing Facility   PT Equipment Recommendations  Equipment Needed: No  Other: Defer to next level of care, if pt d/c home will likely need RW for gait activities    Assessment   Body structures, Functions, Activity limitations: Decreased functional mobility ; Decreased strength;Decreased balance;Decreased endurance;Decreased high-level IADLs;Decreased posture;Decreased ADL status  Assessment: Pt pleasant and agreeable to PT tx and demonstrates improved tolerance and distances with gait activities this date. Pt completes t/f with CGA and ambulates up to 25' with RW with CGA and 10' without AD with Erica. Pt is limited by decreased strength, decreased balance and decreased activity tolerance. Pt will benefit from continued skilled PT in acute care setting to address above deficits. Continue to recommend SNF at d/c d/t pts current deficits. Treatment Diagnosis: Impaired functional mobility  Specific instructions for Next Treatment: Progress mobility as tolerated  Prognosis: Good  Decision Making: Medium Complexity  PT Education: Goals;PT Role;Plan of Care;Precautions;Gait Training;General Safety; Energy Conservation;Transfer Training;Functional Mobility Training;Disease Specific Education  Patient Education: Pt educated regarding safe activity pacing and cues for breathing technique - verbalized understanding  REQUIRES PT FOLLOW UP: Yes  Activity Tolerance  Activity Tolerance: Patient Tolerated treatment well;Patient limited by fatigue;Patient limited by endurance     Patient Diagnosis(es): The primary encounter diagnosis was 2019 novel coronavirus-infected pneumonia (NCIP).  Diagnoses of Diabetic ketoacidosis without coma associated with type 2 diabetes mellitus ([facility] [address] Acute cystitis with hematuria, and Elevated blood pressure reading were also pertinent to this visit. has a past medical history of Bipolar affective disorder ([facility] [address] Coronary artery disease involving native coronary artery of native heart without angina pectoris, COVID-19, Depression, Diabetes ([facility] [address] Hyperlipidemia, Hypertension, Insomnia, and Neuropathy. has a past surgical history that includes Cholecystectomy. Restrictions  Restrictions/Precautions  Restrictions/Precautions: Isolation, Contact Precautions  Required Braces or Orthoses?: No  Position Activity Restriction  Other position/activity restrictions: Up with Assist; COVID +. Droplet +  Subjective   General  Chart Reviewed: Yes  Response To Previous Treatment: Patient with no complaints from previous session. Family / Caregiver Present: No  Referring Practitioner: Beryle Chandler, MD  Subjective  Subjective: Patient seated in chair upon arrival. Patient agreed to participate. General Comment  Comments: RN cleared pt for therapy  Pain Screening  Patient Currently in Pain: Yes  Pain Assessment  Pain Assessment: 0-10  Pain Level: 8  Pain Type: Chronic pain  Pain Location: Head;Back  Pain Descriptors: Aching  Non-Pharmaceutical Pain Intervention(s): Ambulation/Increased Activity; Therapeutic presence;Repositioned;Distraction  Vital Signs  Patient Currently in Pain: Yes       Orientation  Orientation  Overall Orientation Status: Within Normal Limits    Objective   Bed mobility  Rolling to Left: Unable to assess  Rolling to Right: Unable to assess  Supine to Sit: Unable to assess  Sit to Supine: Unable to assess  Comment: Pt seated in chair at start and end of session     Transfers  Sit to Stand: Contact guard assistance  Stand to sit: Contact guard assistance  Comment: Bedside chair to RW, commode to RW with grab bar; bedside chair no AD, cues for hand placement     Ambulation  Ambulation?: Yes  More Ambulation?: Yes  Ambulation 1  Surface: level tile  Device: Rolling Walker  Assistance: Contact guard assistance  Quality of Gait: Step to gait pattern, B minimal foot clearance, antalgic gait RLE with decreased WS and decreased stance, B decreased heel strike, B decreased hip/knee flexion, forward flexed trunk. Pt mildly unsteady no overt LOB. Gait Deviations: Slow Breana;Decreased step length;Decreased step height;Shuffles  Distance: 25'+ 15'  Ambulation 2  Surface - 2: level tile  Device 2: No device(L HHA for balance)  Assistance 2: Minimal assistance  Quality of Gait 2: Step to gait pattern, B minimal foot clearance, antalgic gait RLE with decreased WS and decreased stance, B decreased heel strike, B decreased hip/knee flexion, forward flexed trunk. Pt mildly unsteady no overt LOB. Gait Deviations: Slow Breana;Decreased step length;Decreased step height;Decreased arm swing;Decreased head and trunk rotation; Shuffles  Distance: 13'  Comments: Limited by fatigue and weakness  Stairs/Curb  Stairs?: No(FABRIZIO d/t precautions and safety)        Balance  Posture: Fair  Sitting - Static: Good  Sitting - Dynamic: Good  Standing - Static: Fair  Standing - Dynamic: Fair  Comments: CGA with RW, CGA without AD     Exercises  Gluteal Sets: Seated BLE x 15  Hip Flexion: Seated marches BLE x 15  Hip Abduction: Seated clamshells BLE x 15  Knee Long Arc Quad: Seated BLE x 15  Ankle Pumps: Seated BLE x 15  Comments: cueing for sequencing and technique.                           AM-PAC Score  -PAC Inpatient Mobility Raw Score : 17 (11/29/20 1627)  AM-PAC Inpatient T-Scale Score : 42.13 (11/29/20 1627)  Mobility Inpatient CMS 0-100% Score: 50.57 (11/29/20 1627)  Mobility Inpatient CMS G-Code Modifier : CK (11/29/20 1627)          Goals  Short term goals  Time Frame for Short term goals: 1 week, 11/30/20 unless otherwise noted  Short term goal 1: Pt will perform sit<>stand transfers with mod I -- 11/29: CGA with RW  Short term goal 2: Pt will ambulate 50ft with LRAD and supervision - 11/29: x 25' RW CGA,  Erica x 10' no AD  Short term goal 3: Pt will ascend/descend 5 steps to begin stair training for entry into apartment with BUE support. - 11/29 not attempted. patient in isolation. Short term goal 4: By 11/26, pt will tolerate x12-15 reps BLE exercise to assist with functional transfers with Spo2 remaining >90% -- GOAL MET 11/29: x 15 reps seated BLE exercises  Patient Goals   Patient goals : \"To walk on my own. \"    Plan    Plan  Times per week: 3-5x/wk  Times per day: Daily  Specific instructions for Next Treatment: Progress mobility as tolerated  Current Treatment Recommendations: Strengthening, Balance Training, Functional Mobility Training, Transfer Training, Endurance Training, Gait Training, Safety Education & Training, Patient/Caregiver Education & Training, Home Exercise Program, Stair training, Positioning  Safety Devices  Type of devices: All fall risk precautions in place, Call light within reach, Chair alarm in place, Gait belt, Left in chair, Patient at risk for falls  Restraints  Initially in place: No     Therapy Time   Individual Concurrent Group Co-treatment   Time In 1411         Time Out 1440         Minutes 29         Timed Code Treatment Minutes: 29 Minutes     If pt is unable to be seen after this session, please let this note serve as discharge summary. Please see case management note for discharge disposition. Thank you.     Amanda Ashraf, PT, DPT

## 2020-11-30 LAB
GLUCOSE BLD-MCNC: 122 MG/DL (ref 70–99)
GLUCOSE BLD-MCNC: 172 MG/DL (ref 70–99)
GLUCOSE BLD-MCNC: 194 MG/DL (ref 70–99)
GLUCOSE BLD-MCNC: 195 MG/DL (ref 70–99)
GLUCOSE BLD-MCNC: 228 MG/DL (ref 70–99)
GLUCOSE BLD-MCNC: 56 MG/DL (ref 70–99)
GLUCOSE BLD-MCNC: 78 MG/DL (ref 70–99)
PERFORMED ON: ABNORMAL
PERFORMED ON: NORMAL

## 2020-11-30 PROCEDURE — 6370000000 HC RX 637 (ALT 250 FOR IP): Performed by: INTERNAL MEDICINE

## 2020-11-30 PROCEDURE — 97116 GAIT TRAINING THERAPY: CPT

## 2020-11-30 PROCEDURE — 97530 THERAPEUTIC ACTIVITIES: CPT

## 2020-11-30 PROCEDURE — 1200000000 HC SEMI PRIVATE

## 2020-11-30 PROCEDURE — 6360000002 HC RX W HCPCS: Performed by: NURSE PRACTITIONER

## 2020-11-30 PROCEDURE — 97110 THERAPEUTIC EXERCISES: CPT

## 2020-11-30 PROCEDURE — 6360000002 HC RX W HCPCS: Performed by: INTERNAL MEDICINE

## 2020-11-30 PROCEDURE — 97535 SELF CARE MNGMENT TRAINING: CPT

## 2020-11-30 RX ORDER — INSULIN GLARGINE 100 [IU]/ML
50 INJECTION, SOLUTION SUBCUTANEOUS 2 TIMES DAILY
Qty: 1 VIAL | Refills: 3 | Status: SHIPPED | OUTPATIENT
Start: 2020-11-30

## 2020-11-30 RX ORDER — AMLODIPINE BESYLATE 10 MG/1
10 TABLET ORAL DAILY
Qty: 30 TABLET | Refills: 3 | Status: SHIPPED | OUTPATIENT
Start: 2020-12-01

## 2020-11-30 RX ORDER — LISINOPRIL 20 MG/1
20 TABLET ORAL DAILY
Qty: 30 TABLET | Refills: 3 | Status: SHIPPED | OUTPATIENT
Start: 2020-12-01

## 2020-11-30 RX ADMIN — AMLODIPINE BESYLATE 10 MG: 5 TABLET ORAL at 09:07

## 2020-11-30 RX ADMIN — OXYCODONE HYDROCHLORIDE AND ACETAMINOPHEN 1 TABLET: 5; 325 TABLET ORAL at 23:41

## 2020-11-30 RX ADMIN — QUETIAPINE FUMARATE 300 MG: 100 TABLET ORAL at 22:31

## 2020-11-30 RX ADMIN — BUSPIRONE HYDROCHLORIDE 10 MG: 5 TABLET ORAL at 09:08

## 2020-11-30 RX ADMIN — LISINOPRIL 20 MG: 20 TABLET ORAL at 09:08

## 2020-11-30 RX ADMIN — ENOXAPARIN SODIUM 40 MG: 40 INJECTION SUBCUTANEOUS at 09:06

## 2020-11-30 RX ADMIN — INSULIN LISPRO 3 UNITS: 100 INJECTION, SOLUTION INTRAVENOUS; SUBCUTANEOUS at 16:30

## 2020-11-30 RX ADMIN — BUSPIRONE HYDROCHLORIDE 10 MG: 5 TABLET ORAL at 12:57

## 2020-11-30 RX ADMIN — INSULIN LISPRO 6 UNITS: 100 INJECTION, SOLUTION INTRAVENOUS; SUBCUTANEOUS at 20:30

## 2020-11-30 RX ADMIN — BUSPIRONE HYDROCHLORIDE 10 MG: 5 TABLET ORAL at 22:31

## 2020-11-30 RX ADMIN — INSULIN GLARGINE 50 UNITS: 100 INJECTION, SOLUTION SUBCUTANEOUS at 22:37

## 2020-11-30 RX ADMIN — OXYCODONE HYDROCHLORIDE AND ACETAMINOPHEN 1 TABLET: 5; 325 TABLET ORAL at 17:21

## 2020-11-30 RX ADMIN — OXYCODONE HYDROCHLORIDE AND ACETAMINOPHEN 1 TABLET: 5; 325 TABLET ORAL at 00:27

## 2020-11-30 RX ADMIN — DIVALPROEX SODIUM 250 MG: 250 TABLET, DELAYED RELEASE ORAL at 22:31

## 2020-11-30 RX ADMIN — GABAPENTIN 400 MG: 400 CAPSULE ORAL at 22:31

## 2020-11-30 RX ADMIN — ASPIRIN 325 MG ORAL TABLET 325 MG: 325 PILL ORAL at 09:08

## 2020-11-30 RX ADMIN — PANTOPRAZOLE SODIUM 40 MG: 40 TABLET, DELAYED RELEASE ORAL at 06:29

## 2020-11-30 RX ADMIN — GABAPENTIN 400 MG: 400 CAPSULE ORAL at 12:57

## 2020-11-30 RX ADMIN — DEXAMETHASONE 8 MG: 4 TABLET ORAL at 09:07

## 2020-11-30 RX ADMIN — FLUOXETINE 20 MG: 20 CAPSULE ORAL at 09:08

## 2020-11-30 RX ADMIN — OXYCODONE HYDROCHLORIDE AND ACETAMINOPHEN 1 TABLET: 5; 325 TABLET ORAL at 06:29

## 2020-11-30 RX ADMIN — OXYCODONE HYDROCHLORIDE AND ACETAMINOPHEN 1 TABLET: 5; 325 TABLET ORAL at 12:49

## 2020-11-30 RX ADMIN — ATORVASTATIN CALCIUM 40 MG: 40 TABLET, FILM COATED ORAL at 09:07

## 2020-11-30 RX ADMIN — GABAPENTIN 400 MG: 400 CAPSULE ORAL at 09:06

## 2020-11-30 RX ADMIN — DIVALPROEX SODIUM 250 MG: 250 TABLET, DELAYED RELEASE ORAL at 09:06

## 2020-11-30 ASSESSMENT — PAIN SCALES - GENERAL
PAINLEVEL_OUTOF10: 9
PAINLEVEL_OUTOF10: 8
PAINLEVEL_OUTOF10: 7
PAINLEVEL_OUTOF10: 8

## 2020-11-30 ASSESSMENT — PAIN DESCRIPTION - ORIENTATION: ORIENTATION: LOWER;PROXIMAL

## 2020-11-30 ASSESSMENT — PAIN DESCRIPTION - FREQUENCY: FREQUENCY: INTERMITTENT

## 2020-11-30 ASSESSMENT — PAIN DESCRIPTION - PAIN TYPE: TYPE: ACUTE PAIN

## 2020-11-30 ASSESSMENT — PAIN - FUNCTIONAL ASSESSMENT: PAIN_FUNCTIONAL_ASSESSMENT: PREVENTS OR INTERFERES SOME ACTIVE ACTIVITIES AND ADLS

## 2020-11-30 ASSESSMENT — PAIN DESCRIPTION - LOCATION: LOCATION: HEAD;BACK

## 2020-11-30 ASSESSMENT — PAIN DESCRIPTION - ONSET: ONSET: ON-GOING

## 2020-11-30 ASSESSMENT — PAIN DESCRIPTION - DESCRIPTORS: DESCRIPTORS: ACHING;HEADACHE

## 2020-11-30 ASSESSMENT — PAIN DESCRIPTION - PROGRESSION: CLINICAL_PROGRESSION: NOT CHANGED

## 2020-11-30 NOTE — PROGRESS NOTES
Occupational Therapy  Facility/Department: [facility] C3 TELE/MED SURG/ONC  Daily Treatment Note  NAME: Janet Lr  : 1955  MRN: [id number]    Date of Service: 2020    Discharge Recommendations:  Subacute/Skilled Nursing Facility       Assessment   Performance deficits / Impairments: Decreased functional mobility ; Decreased ADL status; Decreased endurance;Decreased high-level IADLs  Assessment: Pt demos poor balance during ambulation and functional activities. Pt requires MOD A in order to maintain balance ambulating to/from bathroom. Pt attempting to reach out for wall in order to maintain balance without RW. Pt requires CGA and hand placement on grab bar in order to manage transfer to toilet. Pt provided with CGA for sit to stand, and ambulating to sink with MIN A. Pt provided with MIN A for LB dressing and MOD A for maintaining balance. Pt will benefit from increased skilled rehab in order to achieve therapy goals. REQUIRES OT FOLLOW UP: Yes  Activity Tolerance  Activity Tolerance: Patient Tolerated treatment well  Safety Devices  Safety Devices in place: Yes  Type of devices: Call light within reach; Left in chair;Nurse notified; Chair alarm in place         Patient Diagnosis(es): The primary encounter diagnosis was 2019 novel coronavirus-infected pneumonia (NCIP). Diagnoses of Diabetic ketoacidosis without coma associated with type 2 diabetes mellitus ([address] Acute cystitis with hematuria, and Elevated blood pressure reading were also pertinent to this visit. has a past medical history of Bipolar affective disorder ([address] Coronary artery disease involving native coronary artery of native heart without angina pectoris, COVID-19, Depression, Diabetes ([address] Hyperlipidemia, Hypertension, Insomnia, and Neuropathy. has a past surgical history that includes Cholecystectomy.     Restrictions  Restrictions/Precautions  Restrictions/Precautions: Isolation, Contact Precautions  Required Braces or Orthoses?: No  Position Activity Restriction  Other position/activity restrictions: Up with Assist; COVID +. Droplet +  Subjective   General  Chart Reviewed: Yes  Patient assessed for rehabilitation services?: Yes  Family / Caregiver Present: No  Referring Practitioner: Adelita Agee MD  Diagnosis: COVID +  Subjective  Subjective: Pt pleasant and agreeable to therapy, reporting back pain  General Comment  Comments: pt resting in bed, agreeable to therapy initially      Orientation     Objective    ADL  Grooming: Contact guard assistance  UE Dressing: Minimal assistance  LE Dressing: Minimal assistance  Toileting: Minimal assistance(maintaining balance and management of LB dressing)        Balance  Sitting Balance: Stand by assistance  Standing Balance: Minimal assistance  Standing Balance  Time: 2-3 minutes  Activity: bathroom mobility & standing for pericare  Functional Mobility  Functional - Mobility Device: No device  Activity: To/from bathroom  Assist Level: Moderate assistance  Functional Mobility Comments: pt demos poor balance during ambulating to bathroom. Pt demos reaching for environment assistance in order to maintain balance  Toilet Transfers  Toilet - Technique: Ambulating  Equipment Used: Grab bars  Toilet Transfer: Contact guard assistance     Transfers  Sit to stand: Contact guard assistance  Stand to sit: Contact guard assistance                       Cognition  Overall Cognitive Status: Exceptions  Arousal/Alertness: Delayed responses to stimuli  Following Commands: Follows one step commands with repetition; Follows one step commands with increased time  Attention Span: Attends with cues to redirect  Memory: Decreased recall of recent events  Safety Judgement: Decreased awareness of need for assistance  Problem Solving: Assistance required to implement solutions  Insights: Decreased awareness of deficits  Initiation: Requires cues for some  Sequencing: Requires cues for some              Plan   Plan  Times per week: 2-4x/ week  Current Treatment Recommendations: Patient/Caregiver Education & Training, Strengthening, Balance Training, Functional Mobility Training, Self-Care / ADL, Safety Education & Training, Endurance Training, Home Management Training    AM-PAC Score        AM-PAC Inpatient Daily Activity Raw Score: 16 (11/30/20 1628)  AM-PAC Inpatient ADL T-Scale Score : 35.96 (11/30/20 1628)  ADL Inpatient CMS 0-100% Score: 53.32 (11/30/20 1628)  ADL Inpatient CMS G-Code Modifier : CK (11/30/20 1628)    Goals  Short term goals  Time Frame for Short term goals: 1 week 11/30/20  Short term goal 1: Pt will complete toileting with mod I- 11/27 STG met  Short term goal 2: Pt will complete LB dressing with mod I-11/27 requires Supervision for sequencing  Short term goal 3: Pt will complete toilet transfers with mod I; 11/27 requires Supervision  Short term goal 4: Pt will complete bathroom mobility with mod I using LRAD in order to participate in ADLs -11/27 Supervision       Therapy Time   Individual Concurrent Group Co-treatment   Time In 1508         Time Out 1518         Minutes 10         Timed Code Treatment Minutes: Chelita Ly, Brandon Meyer

## 2020-11-30 NOTE — ADT AUTH CERT
Diabetes - Care Day 8 (11/26/2020) by Ant Renner RN         Review Status  Review Entered    Completed  11/30/2020 14:06        Criteria Review       Care Day: 8 Care Date: 11/26/2020 Level of Care: Inpatient Floor    Guideline Day 2    Level Of Care    (X) Floor    11/30/2020 2:06 PM EST by Marja Kanner      gen/covid isolation    Clinical Status    (X) * Hypotension absent    11/30/2020 2:06 PM EST by Marja Kanner      97.8  87  16  115/72  - 92% r/a    (X) * Mental status at baseline    11/30/2020 2:06 PM EST by Coolidge Kocher a/o    (X) * Acidosis absent or improved    11/30/2020 2:06 PM EST by Coolidge Kocher [id number]    ( ) * Blood glucose under acceptable control or improved    11/30/2020 2:06 PM EST by Marja Kanner      213, 134, 279    (X) * Dehydration absent    11/30/2020 2:06 PM EST by Coolidge Kocher absent    (X) * Electrolyte abnormalities absent or improved    11/30/2020 2:06 PM EST by Coolidge Kocher wnl    (X) * Renal function at baseline or improved    11/30/2020 2:06 PM EST by Coolidge Kocher 36/0.8    Activity    (X) * Ambulatory    11/30/2020 2:06 PM EST by Coolidge Kocher as kody    Routes    (X) * Oral hydration, and medications    11/30/2020 2:06 PM EST by Marja Kanner      norvasc 10 po qd asa 10470 po qd, lipitor 40 po qd, buspar 10 po tid, decadron 6mg po qd, depakote  po bid, lovenox 40 sq qd, prozac 20 po qd, neurontin 400 po tid, lantus 50 sq bid,ssi qid,lisinopril 20 po qd,protonix 40 po qd,seroquel 300 qhs    (X) [place] diet, advance as tolerated    11/30/2020 2:06 PM EST by Marja Kanner      carb control diet    Interventions    (X) Complete IV volume, replacement,    11/30/2020 2:06 PM EST by Marja Kanner      yes    (X) Serum glucose, serum ketones, chemistries, ABGs or venous pH measurements, urinalysis    11/30/2020 2:06 PM EST by Marja Kanner qid BS, daily labs,    (X) Bedside glucose monitoring    11/30/2020 2:06 PM EST by and Norvasc started 23 Nov.         HyperLipidemia - controlled on home Statin. Continue, w/ f/u and med adjustment w/ PCP         HypoNatremia - etiology clinically unable to determine but likely hypovolemic.  Follow serial labs on IVF.  Reviewed and documented as above.         HypoKalemia - etiology clinically unable to determine.  Follow serial labs and replace PRN.  Reviewed and documented as above.         GERD - w/out active signs/sxs of dysphagia/odynophagia. No evidence of active PUD or hx of GI bleed. Controlled on home PPI - continue.         Anemia - etiology clinically unable to determine, w/out evidence of active bleeding/hemolysis. Asymptomatic w/out indication for transfusion.  Follow serial labs.  Reviewed and documented as above.         Seizure: Continue medication         Bipolar: Continue medication         Insomnia: Continue medication

## 2020-11-30 NOTE — PROGRESS NOTES
Physical Therapy  Facility/Department: [facility] C3 TELE/MED SURG/ONC  Daily Treatment Note  NAME: Thierno Madrigal  : 1955  MRN: [id number]    Date of Service: 2020    Discharge Recommendations:  Subacute/Skilled Nursing Facility   PT Equipment Recommendations  Equipment Needed: No  Other: Defer to next level of care, if pt d/c home will likely need RW for gait activities    Assessment   Body structures, Functions, Activity limitations: Decreased functional mobility ; Decreased strength;Decreased balance;Decreased endurance;Decreased high-level IADLs;Decreased posture;Decreased ADL status  Assessment: PT tx session focused on functional transfers, gait training, BLE strengthening and overall activity tolerance. Pt completes transferes with CGA and ambulates 20ft with HHA and no UE support. Pt demonstrates marked balance impairment without UE support requiring up to mod A for balance with decreased gait speed and shuffling pattern which puts pt at high risk for falling. Pt with good activity tolerance this date, VSS with Spo2 >90% on RA throughout. Pt will benefit from continued skilled PT in acute care setting to address above deficits. Continue to recommend SNF at d/c as pt is functioning below baseline. Barriers to home discharge include: no  supervision/assist, 16 [address] MARIE, high risk for falling (with hx of recent falls with injury.)  Treatment Diagnosis: Impaired functional mobility  Specific instructions for Next Treatment: Progress mobility as tolerated  Prognosis: Good  Decision Making: Medium Complexity  PT Education: Goals;PT Role;Plan of Care;Precautions;Gait Training;General Safety; Energy Conservation;Transfer Training;Functional Mobility Training;Disease Specific Education  Patient Education: Pt educated regarding safe activity pacing and cues for breathing technique - verbalized understanding  REQUIRES PT FOLLOW UP: Yes  Activity Tolerance  Activity Tolerance: Patient Tolerated treatment well;Patient limited by fatigue;Patient limited by endurance  Activity Tolerance: VSS throughout. Pt with mild c/o dizziness with transitions, able to continue. SpO2 remained >90% on RA throughout     Patient Diagnosis(es): The primary encounter diagnosis was 2019 novel coronavirus-infected pneumonia (NCIP). Diagnoses of Diabetic ketoacidosis without coma associated with type 2 diabetes mellitus ([facility] [address] Acute cystitis with hematuria, and Elevated blood pressure reading were also pertinent to this visit. has a past medical history of Bipolar affective disorder ([facility] [address] Coronary artery disease involving native coronary artery of native heart without angina pectoris, COVID-19, Depression, Diabetes ([facility] [address] Hyperlipidemia, Hypertension, Insomnia, and Neuropathy. has a past surgical history that includes Cholecystectomy. Restrictions  Restrictions/Precautions  Restrictions/Precautions: Isolation, Contact Precautions  Required Braces or Orthoses?: No  Position Activity Restriction  Other position/activity restrictions: Up with Assist; COVID +. Droplet +  Subjective   General  Chart Reviewed: Yes  Response To Previous Treatment: Patient with no complaints from previous session. Family / Caregiver Present: No  Referring Practitioner: Easton Paredes MD  Subjective  Subjective: Pt lying in bed upon arrival, c/o headache but agreeable to PT tx session. General Comment  Comments: RN cleared pt for therapy  Pain Screening  Patient Currently in Pain: Yes  Pain Assessment  Pain Assessment: 0-10  Pain Level: 7  Pain Type: Acute pain  Pain Location: Head;Back  Pain Orientation: Lower;Proximal  Pain Radiating Towards: Lower back;  Top of head down to the sides  Pain Descriptors: Aching;Headache  Pain Frequency: Intermittent  Pain Onset: On-going  Clinical Progression: Not changed  Functional Pain Assessment: Prevents or interferes some active activities and ADLs  Non-Pharmaceutical Pain Intervention(s): Ambulation/Increased Activity; Distraction; Emotional support;Repositioned  Response to Pain Intervention: Patient Satisfied  Vital Signs  Pulse: 109  Heart Rate Source: Monitor  BP: (!) 142/78  BP Location: Right upper arm  Patient Position: Semi fowlers  Patient Currently in Pain: Yes  Oxygen Therapy  SpO2: 95 %  Pulse Oximeter Device Mode: Intermittent  O2 Device: None (Room air)          Objective   Bed mobility  Supine to Sit: Stand by assistance(HOB flat, no use of side rails)  Sit to Supine: Unable to assess(Pt seated in chair at end of session.)  Transfers  Sit to Stand: Contact guard assistance  Stand to sit: Contact guard assistance  Comment: Sit<>stand from multiple surfaces including edge of bed, toilet and bedside commode. Ambulation  Ambulation?: Yes  Ambulation 1  Surface: level tile  Device: No Device  Assistance: Minimal assistance; Moderate assistance  Quality of Gait: Step to gait pattern, B minimal foot clearance with shuffling gait pattern. First bout with HHA min A for weight shifting and balance. Second bout cued for no UE support per PLOF and pt required mod A for balance with marked decreased in step length and gait speed. Distance: 20ft x 2  Comments: No c/o chest pain or SOB, SPO2 93% on RA post ambulation     Balance  Posture: Fair  Sitting - Static: Good  Sitting - Dynamic: Good  Standing - Static: Fair  Standing - Dynamic: Poor  Comments: Required up to mod A for ambulation without UE support this date; With unilateral HHA, target tapping to 6in step simulating stepping up to stair x 2 each LE with min A for balance, difficulty with clearance. Exercises  Heelslides: x15 B  Gluteal Sets: x15 B  Hip Flexion: x15 B  Hip Abduction: x15 B  Knee Long Arc Quad: x15 B  Ankle Pumps: x15 B         Comment: Pt able to don/doff brief in standing, continent of urine, performs pericare with supervision. Assist to don fresh gown while seated. Pt performs hand hygiene standing at sink with CGA.  Pt was setup with Ocate of water and soap to spongebathe while seated in recliner, as well as shower cap. ABle to perform with setup assist only. AM-PAC Score  AM-PAC Inpatient Mobility Raw Score : 16 (11/30/20 1552)  AM-PAC Inpatient T-Scale Score : 40.78 (11/30/20 1552)  Mobility Inpatient CMS 0-100% Score: 54.16 (11/30/20 1552)  Mobility Inpatient CMS G-Code Modifier : CK (11/30/20 1552)          Goals  Short term goals  Time Frame for Short term goals: 1 week, 11/30/20 unless otherwise noted  Short term goal 1: Pt will perform sit<>stand transfers with mod I -- 11/30: CGA  Short term goal 2: Pt will ambulate 50ft with LRAD and supervision - 11/29: x 25' RW CGA,  11/30: 20ft no UE support min-mod A  Short term goal 3: Pt will ascend/descend 5 steps to begin stair training for entry into apartment with BUE support. - 11/30 not attempted. patient in isolation. Short term goal 4: By 11/26, pt will tolerate x12-15 reps BLE exercise to assist with functional transfers with Spo2 remaining >90% -- GOAL MET 11/29: x 15 reps seated BLE exercises  Patient Goals   Patient goals : \"To walk on my own. \"    Plan    Plan  Times per week: 3-5x/wk  Times per day: Daily  Specific instructions for Next Treatment: Progress mobility as tolerated  Current Treatment Recommendations: Strengthening, Balance Training, Functional Mobility Training, Transfer Training, Endurance Training, Gait Training, Safety Education & Training, Patient/Caregiver Education & Training, Home Exercise Program, Stair training, Positioning  Safety Devices  Type of devices:  All fall risk precautions in place, Call light within reach, Nurse notified, Left in chair  Restraints  Initially in place: No     Therapy Time   Individual Concurrent Group Co-treatment   Time In 1500         Time Out 1546         Minutes 46         Timed Code Treatment Minutes: 38 Minutes(8 min OT ADL)       Rm Young PT     If pt is unable to be seen after this session, please let this note serve as

## 2020-11-30 NOTE — PROGRESS NOTES
Hospitalist Progress Note      PCP: Rocael Zimmer DO, DO    Date of Admission: 11/19/2020    Chief Complaint: Ache/Cough/Fever    Subjective: [place] PO. Still SOB with exertion but improved from yesterday. Patient seen sitting  in chair at bedside. Discussed with RN regarding care plan. Medications:  Reviewed    Infusion Medications    dextrose 100 mL/hr (11/20/20 0200)     Scheduled Medications    insulin lispro  0-18 Units Subcutaneous Q4H    insulin glargine  50 Units Subcutaneous BID    lisinopril  20 mg Oral Daily    amLODIPine  10 mg Oral Daily    dexamethasone  6 mg Oral Daily    enoxaparin  40 mg Subcutaneous Daily    aspirin  325 mg Oral Daily    atorvastatin  40 mg Oral Daily    busPIRone  10 mg Oral TID    divalproex  250 mg Oral BID    FLUoxetine  20 mg Oral Daily    gabapentin  400 mg Oral TID    pantoprazole  40 mg Oral QAM AC    QUEtiapine  300 mg Oral Nightly    insulin lispro  0-9 Units Subcutaneous Nightly     PRN Meds: oxyCODONE-acetaminophen, acetaminophen, labetalol, ondansetron, glucose, dextrose, glucagon (rDNA), dextrose      Intake/Output Summary (Last 24 hours) at 11/30/2020 1323  Last data filed at 11/30/2020 0221  Gross per 24 hour   Intake 960 ml   Output --   Net 960 ml       Physical Exam Performed:    /66   Pulse 94   Temp 97.7 °F (36.5 °C) (Oral)   Resp 14   Ht 5' 2\" (1.575 m)   Wt 138 lb 14.2 oz (63 kg)   SpO2 93%   BMI 25.40 kg/m²     I performed an audiovisual assessment, based on new provisions and guidance offered by [facility] on March 18, 2020 in setting of COVID-19 outbreak and in order to preserve personal protective equipment in accordance with the flexibilities announced by CMS on March 30, 2020. References:   https://med. [place].gov/Portals/0/Resources/COVID-19/3_18%20Telemed%20Guidance%20Updated%20March%2018. pdf?eyd=[email or web address]   http://[employer]/. pdf Bedside physical examination deferred. However, I did visually inspect the patient and observed the following:     General appearance: No apparent distress, appears stated age and cooperative. Neck: Deferred. Respiratory:  Normal respiratory effort. Cardiovascular: Deferred. Abdomen: Deferred. Musculoskelatal: Deferred. Neurologic:  Deferred. Psychiatric: Alert and oriented, thought content appropriate, normal insight  Skin: Deferred. Urinalysis:    Lab Results   Component Value Date    NITRU POSITIVE 11/19/2020    WBCUA  11/19/2020    BACTERIA 1+ 11/19/2020    RBCUA 21-50 11/19/2020    BLOODU SMALL 11/19/2020    SPECGRAV 1.015 11/19/2020    GLUCOSEU >=1000 11/19/2020     Active Hospital Problems    Diagnosis    COVID-19 [U07.1]    DKA, type 2, not at goal [facility]) [E11.10]       Assessment/Plan:  COVID 19 POSITIVE - Positive test OSH PTA on or about 17 Nov w/ sxs onset approx 15 Nov.  Started on Decadron - continued. Continue PO at discharge. Required max of 2L of O2 and now weaned off onto RA. PNA - likely viral 2nd to Covid 19. Not currently on ABX. DKA - initially requiring Insulin gtt, now titrated off. Started on Lantus and titrated up to higher dose BID on 21/23 Nov. Follow FSBS/SSI high regimen. Exacerbated by steroids.       HTN/CAD - w/ known CAD but no evidence of active signs/sxs of ischemia/failure. Currently uncontrolled on home meds w/ vitals reviewed and documented as above. Lisinopril dose increased and Norvasc started 23 Nov.     HyperLipidemia - controlled on home Statin. Continue, w/ f/u and med adjustment w/ PCP    HypoNatremia - etiology clinically unable to determine but likely hypovolemic. Follow serial labs on IVF. Reviewed and documented as above. HypoKalemia - etiology clinically unable to determine. Follow serial labs and replace PRN. Reviewed and documented as above. GERD - w/out active signs/sxs of dysphagia/odynophagia.  No evidence of active PUD or hx of GI bleed. Controlled on home PPI - continue. Anemia - etiology clinically unable to determine, w/out evidence of active bleeding/hemolysis. Asymptomatic w/out indication for transfusion. Follow serial labs. Reviewed and documented as above. Seizure: Continue medication    Bipolar: Continue medication    Insomnia: Continue medication         DVT Prophylaxis: LMWH  Diet: DIET CARB CONTROL;  Code Status: Prior      PT/OT Eval Status: seen w/ recs for SNF. Dispo - Medically stable for discharge since Wed 25 Nov pending placement decision. Re-eval by PT/OT on 11/27. Still recommendation is SNF.   Currently Covid SNF bed not available     Aroldo Hoskins MD

## 2020-11-30 NOTE — PROGRESS NOTES
Assessment completed and documented. VSS. A/ox4. Denies pain. Frequently asking for food. x1 assist to bathroom. Denies further needs at this time. Bed locked and in lowest position. Bedside table and call light within reach. Will continue to monitor.

## 2020-11-30 NOTE — PROGRESS NOTES
Shift assessment completed. Pt A&Ox4, VSS. Denies any needs at this time. Bed locked and in lowest position. Pt prefers to sit in chair, but calls out when appropriate to use the restroom. Pt is wearing non skid socks. Call light & bedside table are within reach. Will continue to monitor.

## 2020-11-30 NOTE — PLAN OF CARE
Problem: Falls - Risk of:  Goal: Will remain free from falls  Description: Will remain free from falls  Outcome: Ongoing  Note: Pt is wearing non skid socks. Call light & bedside table are within reach.  Pt calls out when appropriate & makes needs known     Problem: Falls - Risk of:  Goal: Absence of physical injury  Description: Absence of physical injury  Outcome: Ongoing

## 2020-11-30 NOTE — CARE COORDINATION
Chart reviewed day 11. Care managed per IM. Medically ready for d/c since last week. Spoke with Miryam at [facility]. Reported DON still reviewing charts. Will let me know if can accept as soon as possible. Waiting. Sydnee Miller RN    Spoke with Miryam at [facility]. Stated they can accept patient into non covid unit. However, AMPAC from yesterday is greater than F2S limits. PT/OT to reeval today if AMPAC 15 or below, can go without cert if not will require insurance approval prior to d/c to SNF. Waiting therapy notes. Sydnee Miller RN      Per Miryam, insurance has waived certs due to the pandemic and they can accept patient. Today. MD messaged for d/c order.  Sydnee Miller RN

## 2020-11-30 NOTE — CARE COORDINATION
Spoke with patient updated on acceptance to [facility] and transport arranged for 930 tonight. Patient refusing d/c. Stated she needs clothes, she needs her rent paid. Discussed her son helping her with dropping off clothes and delivering rent check. Stated she isn't sure he can do it and he's at work. We \"just dropped this on her\". Explained she has been medically ready for a few days now. The \"time is too late\". Transport time changed till noon tomorrow. MD and RN updated. Patient to speak with son when he is available.  Compa Yee RN

## 2020-12-01 VITALS
RESPIRATION RATE: 16 BRPM | BODY MASS INDEX: 25.56 KG/M2 | HEART RATE: 102 BPM | OXYGEN SATURATION: 94 % | WEIGHT: 138.89 LBS | TEMPERATURE: 98.4 F | HEIGHT: 62 IN | SYSTOLIC BLOOD PRESSURE: 113 MMHG | DIASTOLIC BLOOD PRESSURE: 73 MMHG

## 2020-12-01 LAB
GLUCOSE BLD-MCNC: 112 MG/DL (ref 70–99)
GLUCOSE BLD-MCNC: 159 MG/DL (ref 70–99)
GLUCOSE BLD-MCNC: 306 MG/DL (ref 70–99)
PERFORMED ON: ABNORMAL

## 2020-12-01 PROCEDURE — 6360000002 HC RX W HCPCS: Performed by: INTERNAL MEDICINE

## 2020-12-01 PROCEDURE — 6360000002 HC RX W HCPCS: Performed by: NURSE PRACTITIONER

## 2020-12-01 PROCEDURE — 6370000000 HC RX 637 (ALT 250 FOR IP): Performed by: INTERNAL MEDICINE

## 2020-12-01 RX ADMIN — OXYCODONE HYDROCHLORIDE AND ACETAMINOPHEN 1 TABLET: 5; 325 TABLET ORAL at 05:11

## 2020-12-01 RX ADMIN — BUSPIRONE HYDROCHLORIDE 10 MG: 5 TABLET ORAL at 09:36

## 2020-12-01 RX ADMIN — INSULIN GLARGINE 50 UNITS: 100 INJECTION, SOLUTION SUBCUTANEOUS at 09:36

## 2020-12-01 RX ADMIN — PANTOPRAZOLE SODIUM 40 MG: 40 TABLET, DELAYED RELEASE ORAL at 05:12

## 2020-12-01 RX ADMIN — DIVALPROEX SODIUM 250 MG: 250 TABLET, DELAYED RELEASE ORAL at 09:35

## 2020-12-01 RX ADMIN — DEXAMETHASONE 6 MG: 4 TABLET ORAL at 09:35

## 2020-12-01 RX ADMIN — LISINOPRIL 20 MG: 20 TABLET ORAL at 09:36

## 2020-12-01 RX ADMIN — FLUOXETINE 20 MG: 20 CAPSULE ORAL at 09:36

## 2020-12-01 RX ADMIN — INSULIN LISPRO 12 UNITS: 100 INJECTION, SOLUTION INTRAVENOUS; SUBCUTANEOUS at 03:23

## 2020-12-01 RX ADMIN — AMLODIPINE BESYLATE 10 MG: 5 TABLET ORAL at 09:36

## 2020-12-01 RX ADMIN — ATORVASTATIN CALCIUM 40 MG: 40 TABLET, FILM COATED ORAL at 09:35

## 2020-12-01 RX ADMIN — ASPIRIN 325 MG ORAL TABLET 325 MG: 325 PILL ORAL at 09:36

## 2020-12-01 RX ADMIN — ENOXAPARIN SODIUM 40 MG: 40 INJECTION SUBCUTANEOUS at 09:34

## 2020-12-01 RX ADMIN — OXYCODONE HYDROCHLORIDE AND ACETAMINOPHEN 1 TABLET: 5; 325 TABLET ORAL at 09:41

## 2020-12-01 RX ADMIN — GABAPENTIN 400 MG: 400 CAPSULE ORAL at 09:35

## 2020-12-01 ASSESSMENT — PAIN DESCRIPTION - LOCATION: LOCATION: BACK;HEAD

## 2020-12-01 ASSESSMENT — PAIN SCALES - GENERAL
PAINLEVEL_OUTOF10: 8

## 2020-12-01 NOTE — CARE COORDINATION
[facility]    Referral received from CM to follow for home care services. I will follow for needs, and speak with patient to verify demos. DC order noted, all docs needed have been faxed to [facility] for home care services.     Home care to see patient within 24-48 hrs    Tricia Coy RN, BSN CTN  [facility] [phone]

## 2020-12-01 NOTE — PROGRESS NOTES
Pt refused to to sign D/C papers. PT says that she called her son but refuses to say exactly when he will be here and to give nurse her sons current phone number.

## 2020-12-01 NOTE — DISCHARGE SUMMARY
inpatient. Overall improved and no longer symptomatic. PT/OT rec SNF which pt declined. Will be d/zachary home with Syed Barajas and walker arranged by SIMA. She was diagnosed + for COVID  on 11/9/2020, over 20 days from onset of symptoms so does not need self quarantine but should continue to use a mask and social distance when around other people. To follow up with her PCP.         DKA - initially requiring Insulin gtt,  titrated off. Electrolytes replaced as needed. Her  DM was exacerbated by steroids. Started on Lantus and titrated up to higher dose BID. Improved. Continue lantus, SSI and carb controlled diet.           HTN:  Lisinopril dose was  increased and Norvasc started with better BP control.      HyperLipidemia - continue statin      GERD - continue PPI.           Seizure: Continue medication     Bipolar: Continue medication     Insomnia: Continue medication              Physical Exam Performed:     /73   Pulse 102   Temp 98.4 °F (36.9 °C) (Oral)   Resp 16   Ht 5' 2\" (1.575 m)   Wt 138 lb 14.2 oz (63 kg)   SpO2 94%   BMI 25.40 kg/m²           Labs: For convenience and continuity at follow-up the following most recent labs are provided:      CBC:    Lab Results   Component Value Date    WBC 7.6 11/26/2020    HGB 10.3 11/26/2020    HCT 30.9 11/26/2020     11/26/2020       Renal:    Lab Results   Component Value Date     11/26/2020    K 4.0 11/26/2020    K 4.1 11/19/2020     11/26/2020    CO2 26 11/26/2020    BUN 36 11/26/2020    CREATININE 0.8 11/26/2020    CALCIUM 8.7 11/26/2020    PHOS 4.0 11/26/2020         Significant Diagnostic Studies    Radiology:   XR CHEST PORTABLE   Final Result   Subtle increased bibasilar lung opacities are nonspecific and may represent   atelectasis versus pneumonia.                 Consults:     IP CONSULT TO HOSPITALIST  IP CONSULT TO HOME CARE NEEDS    Disposition: Home with Syed Barajas       Condition at Discharge: Stable    Discharge Instructions/Follow-up:  F/u with PCP in one week    Code Status:  Prior   Activity: activity as tolerated    Diet: diabetic diet      Discharge Medications:     Current Discharge Medication List           Details   amLODIPine (NORVASC) 10 MG tablet Take 1 tablet by mouth daily  Qty: 30 tablet, Refills: 3              Details   insulin glargine (LANTUS) 100 UNIT/ML injection vial Inject 50 Units into the skin 2 times daily  Qty: 1 vial, Refills: 3      insulin lispro (HUMALOG) 100 UNIT/ML injection vial Inject 0-9 Units into the skin nightly  Qty: 1 vial, Refills: 3      lisinopril (PRINIVIL;ZESTRIL) 20 MG tablet Take 1 tablet by mouth daily  Qty: 30 tablet, Refills: 3              Details   atorvastatin (LIPITOR) 40 MG tablet Take 40 mg by mouth daily      divalproex (DEPAKOTE) 250 MG DR tablet Take 250 mg by mouth 2 times daily 250 mg at night and 150 mg in the morning      busPIRone (BUSPAR) 10 MG tablet Take 10 mg by mouth 3 times daily      QUEtiapine (SEROQUEL) 300 MG tablet TAKE ONE TABLET BY MOUTH ONCE NIGHTLY  Qty: 90 tablet, Refills: 2      omeprazole (PRILOSEC) 20 MG delayed release capsule Take 1 capsule by mouth Daily  Qty: 90 capsule, Refills: 2      FLUoxetine (PROZAC) 20 MG tablet Take 1 tablet by mouth daily  Qty: 90 tablet, Refills: 5      glucose blood VI test strips (EVA CONTOUR TEST) strip 1 each by In Vitro route daily As needed. Qty: 100 each, Refills: 0      Insulin Pen Needle 32G X 4 MM MISC 4 times a day  Qty: 150 each, Refills: 3      aspirin 325 MG tablet Take 1 tablet by mouth daily  Qty: 30 tablet, Refills: 5    Associated Diagnoses: Uncontrolled diabetes mellitus ([address]             Time Spent on discharge is more than 30 minutes in the examination, evaluation, counseling and review of medications and discharge plan. Signed:    Lore Marquez MD   12/1/2020      Thank you Rocael Zimmer DO, DO for the opportunity to be involved in this patient's care.  If you have any questions or concerns please feel free to contact me at [phone].

## 2020-12-01 NOTE — PROGRESS NOTES
Delivered walker to [facility] room.   Thanks for the referral.  Luis Angel Lopez  12/1/2020

## 2020-12-01 NOTE — PLAN OF CARE
Problem: Falls - Risk of:  Goal: Will remain free from falls  Description: Will remain free from falls  12/1/2020 0049 by Akbar Isabel RN  Outcome: Ongoing  Note: Pt will remain free from falls. Pt is a high fall risk. Call light within reach. Bed side table within reach. Wheels locked. Bed in lowest position. Bed check in place. Pt instructed to call out for assistance. Pt expressesed understanding & calls out appropritately. All care per orders. Will continue to monitor.

## 2020-12-02 ENCOUNTER — CARE COORDINATION (OUTPATIENT)
Dept: CASE MANAGEMENT | Age: 65
End: 2020-12-02

## 2020-12-03 ENCOUNTER — CARE COORDINATION (OUTPATIENT)
Dept: CASE MANAGEMENT | Age: 65
End: 2020-12-03

## 2020-12-04 NOTE — ADT AUTH CERT
Diabetes - Care Day 12 (11/30/2020) by Ravi Drummond         Review Status  Review Entered    Completed  12/4/2020 11:14        Criteria Review       Care Day: 12 Care Date: 11/30/2020 Level of Care: Inpatient Floor    Guideline Day 2    Level Of Care    (X) Floor    12/4/2020 11:14 AM EST by [facility] cast[facility] unit    Clinical Status    (X) * Hypotension absent    (X) * Mental status at baseline    (X) * Acidosis absent or improved    ( ) * Blood glucose under acceptable control or improved    (X) * Dehydration absent    (X) * Electrolyte abnormalities absent or improved    (X) * Renal function at baseline or improved    Activity    (X) * Ambulatory    Routes    (X) * Oral hydration, and medications    (X) [place] diet, advance as tolerated    Interventions    (X) Complete IV volume, replacement,    (X) Serum glucose, serum ketones, chemistries, ABGs or venous pH measurements, urinalysis    (X) Bedside glucose monitoring    (X) Diabetic education    Medications    (X) * IV insulin absent    ( ) * Outpatient diabetic medication regimen initiated    (X) Subcutaneous insulin    * Milestone    Additional Notes    Continued Stay Review Note         11/30/20         Patient Visit Status: inpt    Level of Care: Covid unit         Last set of vitals: /66   Pulse 94   Temp 97.7 °F (36.5 °C) (Oral)   Resp 14   Ht 5' 2\" (1.575 m)   Wt 138 lb 14.2 oz (63 kg)   SpO2 90%   BMI 25.40 kg/m²         Current Treatments: Still SOB but slowly improving. Remains on po decadron qd, oximetry, telemetry, droplet precautions          Review/Comments:     COVID 19 POSITIVE - Positive test OSH PTA on or about 17 Nov w/ sxs onset approx 15 Nov.  Started on Decadron - continued. Continue PO at discharge.  Required max of 2L of O2 and now weaned off onto RA.         PNA - likely viral 2nd to Covid 19.  Not currently on ABX.         DKA - initially requiring Insulin gtt, now titrated off.  Started on Lantus and titrated up to higher dose BID on 21/23 Nov. Follow FSBS/SSI high regimen. Exacerbated by steroids.           HTN/CAD - w/ known CAD but no evidence of active signs/sxs of ischemia/failure. Currently uncontrolled on home meds w/ vitals reviewed and documented as above. Lisinopril dose increased and Norvasc started 23 Nov.         HyperLipidemia - controlled on home Statin. Continue, w/ f/u and med adjustment w/ PCP         HypoNatremia - etiology clinically unable to determine but likely hypovolemic.  Follow serial labs on IVF.  Reviewed and documented as above.         HypoKalemia - etiology clinically unable to determine.  Follow serial labs and replace PRN.  Reviewed and documented as above.         GERD - w/out active signs/sxs of dysphagia/odynophagia. No evidence of active PUD or hx of GI bleed. Controlled on home PPI - continue.         Anemia - etiology clinically unable to determine, w/out evidence of active bleeding/hemolysis. Asymptomatic w/out indication for transfusion.  Follow serial labs.  Reviewed and documented as above.         Seizure: Continue medication         Bipolar: Continue medication         Insomnia: Continue medication       Anticipated Discharge Plan: home

## 2021-11-21 ENCOUNTER — APPOINTMENT (OUTPATIENT)
Dept: CT IMAGING | Age: 66
End: 2021-11-21
Payer: MEDICARE

## 2021-11-21 ENCOUNTER — HOSPITAL ENCOUNTER (OUTPATIENT)
Age: 66
Setting detail: OBSERVATION
Discharge: HOME | End: 2021-11-24
Attending: EMERGENCY MEDICINE | Admitting: INTERNAL MEDICINE
Payer: MEDICARE

## 2021-11-21 DIAGNOSIS — I10 ESSENTIAL HYPERTENSION: ICD-10-CM

## 2021-11-21 DIAGNOSIS — R73.9 HYPERGLYCEMIA: ICD-10-CM

## 2021-11-21 DIAGNOSIS — R29.90 STROKE-LIKE SYMPTOMS: Primary | ICD-10-CM

## 2021-11-21 DIAGNOSIS — F41.9 ANXIETY: ICD-10-CM

## 2021-11-21 LAB
A/G RATIO: 1.2 (ref 1.1–2.2)
ALBUMIN SERPL-MCNC: 3.7 G/DL (ref 3.4–5)
ALP BLD-CCNC: 91 U/L (ref 40–129)
ALT SERPL-CCNC: 18 U/L (ref 10–40)
ANION GAP SERPL CALCULATED.3IONS-SCNC: 12 MMOL/L (ref 3–16)
AST SERPL-CCNC: 25 U/L (ref 15–37)
BASOPHILS ABSOLUTE: 0.1 K/UL (ref 0–0.2)
BASOPHILS RELATIVE PERCENT: 1.2 %
BILIRUB SERPL-MCNC: 0.4 MG/DL (ref 0–1)
BUN BLDV-MCNC: 20 MG/DL (ref 7–20)
CALCIUM SERPL-MCNC: 9.1 MG/DL (ref 8.3–10.6)
CHLORIDE BLD-SCNC: 98 MMOL/L (ref 99–110)
CO2: 25 MMOL/L (ref 21–32)
CREAT SERPL-MCNC: 1 MG/DL (ref 0.6–1.2)
EOSINOPHILS ABSOLUTE: 0.3 K/UL (ref 0–0.6)
EOSINOPHILS RELATIVE PERCENT: 3.8 %
GFR AFRICAN AMERICAN: >60
GFR NON-AFRICAN AMERICAN: 56
GLUCOSE BLD-MCNC: 248 MG/DL (ref 70–99)
GLUCOSE BLD-MCNC: 254 MG/DL (ref 70–99)
HCT VFR BLD CALC: 33 % (ref 36–48)
HEMOGLOBIN: 11.3 G/DL (ref 12–16)
INR BLD: 0.99 (ref 0.88–1.12)
LIPASE: 15 U/L (ref 13–60)
LYMPHOCYTES ABSOLUTE: 3.2 K/UL (ref 1–5.1)
LYMPHOCYTES RELATIVE PERCENT: 42 %
MCH RBC QN AUTO: 29.7 PG (ref 26–34)
MCHC RBC AUTO-ENTMCNC: 34.3 G/DL (ref 31–36)
MCV RBC AUTO: 86.6 FL (ref 80–100)
MONOCYTES ABSOLUTE: 0.6 K/UL (ref 0–1.3)
MONOCYTES RELATIVE PERCENT: 7.8 %
NEUTROPHILS ABSOLUTE: 3.5 K/UL (ref 1.7–7.7)
NEUTROPHILS RELATIVE PERCENT: 45.2 %
PDW BLD-RTO: 14.9 % (ref 12.4–15.4)
PERFORMED ON: ABNORMAL
PLATELET # BLD: 252 K/UL (ref 135–450)
PMV BLD AUTO: 8 FL (ref 5–10.5)
POTASSIUM SERPL-SCNC: 4.4 MMOL/L (ref 3.5–5.1)
PROTHROMBIN TIME: 11.2 SEC (ref 9.9–12.7)
RBC # BLD: 3.81 M/UL (ref 4–5.2)
SODIUM BLD-SCNC: 135 MMOL/L (ref 136–145)
SPECIMEN STATUS: NORMAL
TOTAL PROTEIN: 6.8 G/DL (ref 6.4–8.2)
TROPONIN: <0.01 NG/ML
WBC # BLD: 7.7 K/UL (ref 4–11)

## 2021-11-21 PROCEDURE — 85610 PROTHROMBIN TIME: CPT

## 2021-11-21 PROCEDURE — 83690 ASSAY OF LIPASE: CPT

## 2021-11-21 PROCEDURE — 6360000004 HC RX CONTRAST MEDICATION: Performed by: EMERGENCY MEDICINE

## 2021-11-21 PROCEDURE — 85025 COMPLETE CBC W/AUTO DIFF WBC: CPT

## 2021-11-21 PROCEDURE — 70498 CT ANGIOGRAPHY NECK: CPT

## 2021-11-21 PROCEDURE — 84484 ASSAY OF TROPONIN QUANT: CPT

## 2021-11-21 PROCEDURE — 93005 ELECTROCARDIOGRAM TRACING: CPT | Performed by: EMERGENCY MEDICINE

## 2021-11-21 PROCEDURE — G0378 HOSPITAL OBSERVATION PER HR: HCPCS

## 2021-11-21 PROCEDURE — 80053 COMPREHEN METABOLIC PANEL: CPT

## 2021-11-21 PROCEDURE — 99285 EMERGENCY DEPT VISIT HI MDM: CPT

## 2021-11-21 PROCEDURE — 96374 THER/PROPH/DIAG INJ IV PUSH: CPT

## 2021-11-21 PROCEDURE — 6360000002 HC RX W HCPCS: Performed by: EMERGENCY MEDICINE

## 2021-11-21 PROCEDURE — 70450 CT HEAD/BRAIN W/O DYE: CPT

## 2021-11-21 RX ORDER — DIPHENHYDRAMINE HYDROCHLORIDE 50 MG/ML
25 INJECTION INTRAMUSCULAR; INTRAVENOUS ONCE
Status: COMPLETED | OUTPATIENT
Start: 2021-11-21 | End: 2021-11-21

## 2021-11-21 RX ADMIN — IOPAMIDOL 75 ML: 755 INJECTION, SOLUTION INTRAVENOUS at 21:34

## 2021-11-21 RX ADMIN — DIPHENHYDRAMINE HYDROCHLORIDE 25 MG: 50 INJECTION, SOLUTION INTRAMUSCULAR; INTRAVENOUS at 21:30

## 2021-11-21 ASSESSMENT — PAIN DESCRIPTION - LOCATION: LOCATION: SHOULDER

## 2021-11-21 ASSESSMENT — PAIN DESCRIPTION - PAIN TYPE: TYPE: ACUTE PAIN

## 2021-11-21 ASSESSMENT — PAIN DESCRIPTION - ORIENTATION: ORIENTATION: LEFT

## 2021-11-21 ASSESSMENT — PAIN DESCRIPTION - FREQUENCY: FREQUENCY: INTERMITTENT

## 2021-11-21 ASSESSMENT — PAIN SCALES - GENERAL: PAINLEVEL_OUTOF10: 7

## 2021-11-22 ENCOUNTER — APPOINTMENT (OUTPATIENT)
Dept: MRI IMAGING | Age: 66
End: 2021-11-22
Payer: MEDICARE

## 2021-11-22 PROBLEM — I63.9 ACUTE CVA (CEREBROVASCULAR ACCIDENT) (HCC): Status: ACTIVE | Noted: 2021-11-22

## 2021-11-22 LAB
BASOPHILS ABSOLUTE: 0.1 K/UL (ref 0–0.2)
BASOPHILS RELATIVE PERCENT: 0.9 %
C-REACTIVE PROTEIN: <3 MG/L (ref 0–5.1)
EKG ATRIAL RATE: 97 BPM
EKG DIAGNOSIS: NORMAL
EKG P AXIS: 48 DEGREES
EKG P-R INTERVAL: 178 MS
EKG Q-T INTERVAL: 410 MS
EKG QRS DURATION: 86 MS
EKG QTC CALCULATION (BAZETT): 520 MS
EKG R AXIS: 48 DEGREES
EKG T AXIS: 51 DEGREES
EKG VENTRICULAR RATE: 97 BPM
EOSINOPHILS ABSOLUTE: 0.3 K/UL (ref 0–0.6)
EOSINOPHILS RELATIVE PERCENT: 3.4 %
ESTIMATED AVERAGE GLUCOSE: 240.3 MG/DL
FOLATE: >20 NG/ML (ref 4.78–24.2)
GLUCOSE BLD-MCNC: 169 MG/DL (ref 70–99)
GLUCOSE BLD-MCNC: 196 MG/DL (ref 70–99)
GLUCOSE BLD-MCNC: 204 MG/DL (ref 70–99)
GLUCOSE BLD-MCNC: 259 MG/DL (ref 70–99)
HBA1C MFR BLD: 10 %
HCT VFR BLD CALC: 33.5 % (ref 36–48)
HEMOGLOBIN: 11.4 G/DL (ref 12–16)
LV EF: 51 %
LVEF MODALITY: NORMAL
LYMPHOCYTES ABSOLUTE: 3 K/UL (ref 1–5.1)
LYMPHOCYTES RELATIVE PERCENT: 36.4 %
MCH RBC QN AUTO: 29.5 PG (ref 26–34)
MCHC RBC AUTO-ENTMCNC: 34.1 G/DL (ref 31–36)
MCV RBC AUTO: 86.6 FL (ref 80–100)
MONOCYTES ABSOLUTE: 0.7 K/UL (ref 0–1.3)
MONOCYTES RELATIVE PERCENT: 8.3 %
NEUTROPHILS ABSOLUTE: 4.2 K/UL (ref 1.7–7.7)
NEUTROPHILS RELATIVE PERCENT: 51 %
PDW BLD-RTO: 14.6 % (ref 12.4–15.4)
PERFORMED ON: ABNORMAL
PLATELET # BLD: 248 K/UL (ref 135–450)
PMV BLD AUTO: 8.1 FL (ref 5–10.5)
RBC # BLD: 3.87 M/UL (ref 4–5.2)
SEDIMENTATION RATE, ERYTHROCYTE: 81 MM/HR (ref 0–30)
TROPONIN: <0.01 NG/ML
TROPONIN: <0.01 NG/ML
TSH SERPL DL<=0.05 MIU/L-ACNC: 1.73 UIU/ML (ref 0.27–4.2)
VITAMIN B-12: 972 PG/ML (ref 211–911)
WBC # BLD: 8.3 K/UL (ref 4–11)

## 2021-11-22 PROCEDURE — 93306 TTE W/DOPPLER COMPLETE: CPT

## 2021-11-22 PROCEDURE — 84484 ASSAY OF TROPONIN QUANT: CPT

## 2021-11-22 PROCEDURE — 86140 C-REACTIVE PROTEIN: CPT

## 2021-11-22 PROCEDURE — 99223 1ST HOSP IP/OBS HIGH 75: CPT | Performed by: PSYCHIATRY & NEUROLOGY

## 2021-11-22 PROCEDURE — 1200000000 HC SEMI PRIVATE

## 2021-11-22 PROCEDURE — 93010 ELECTROCARDIOGRAM REPORT: CPT | Performed by: INTERNAL MEDICINE

## 2021-11-22 PROCEDURE — 87186 SC STD MICRODIL/AGAR DIL: CPT

## 2021-11-22 PROCEDURE — G0378 HOSPITAL OBSERVATION PER HR: HCPCS

## 2021-11-22 PROCEDURE — 70551 MRI BRAIN STEM W/O DYE: CPT

## 2021-11-22 PROCEDURE — 6370000000 HC RX 637 (ALT 250 FOR IP): Performed by: NURSE PRACTITIONER

## 2021-11-22 PROCEDURE — 2580000003 HC RX 258: Performed by: NURSE PRACTITIONER

## 2021-11-22 PROCEDURE — 97116 GAIT TRAINING THERAPY: CPT

## 2021-11-22 PROCEDURE — 82607 VITAMIN B-12: CPT

## 2021-11-22 PROCEDURE — 6370000000 HC RX 637 (ALT 250 FOR IP): Performed by: INTERNAL MEDICINE

## 2021-11-22 PROCEDURE — 83036 HEMOGLOBIN GLYCOSYLATED A1C: CPT

## 2021-11-22 PROCEDURE — 85025 COMPLETE CBC W/AUTO DIFF WBC: CPT

## 2021-11-22 PROCEDURE — 97161 PT EVAL LOW COMPLEX 20 MIN: CPT

## 2021-11-22 PROCEDURE — 6360000002 HC RX W HCPCS: Performed by: NURSE PRACTITIONER

## 2021-11-22 PROCEDURE — 97166 OT EVAL MOD COMPLEX 45 MIN: CPT

## 2021-11-22 PROCEDURE — 97535 SELF CARE MNGMENT TRAINING: CPT

## 2021-11-22 PROCEDURE — 96372 THER/PROPH/DIAG INJ SC/IM: CPT

## 2021-11-22 PROCEDURE — 85652 RBC SED RATE AUTOMATED: CPT

## 2021-11-22 PROCEDURE — 84443 ASSAY THYROID STIM HORMONE: CPT

## 2021-11-22 PROCEDURE — 36415 COLL VENOUS BLD VENIPUNCTURE: CPT

## 2021-11-22 PROCEDURE — 83921 ORGANIC ACID SINGLE QUANT: CPT

## 2021-11-22 PROCEDURE — 86431 RHEUMATOID FACTOR QUANT: CPT

## 2021-11-22 PROCEDURE — 82390 ASSAY OF CERULOPLASMIN: CPT

## 2021-11-22 PROCEDURE — 82746 ASSAY OF FOLIC ACID SERUM: CPT

## 2021-11-22 PROCEDURE — 87077 CULTURE AEROBIC IDENTIFY: CPT

## 2021-11-22 PROCEDURE — 86038 ANTINUCLEAR ANTIBODIES: CPT

## 2021-11-22 PROCEDURE — 87086 URINE CULTURE/COLONY COUNT: CPT

## 2021-11-22 RX ORDER — DIVALPROEX SODIUM 250 MG/1
250 TABLET, DELAYED RELEASE ORAL NIGHTLY
Status: DISCONTINUED | OUTPATIENT
Start: 2021-11-22 | End: 2021-11-24 | Stop reason: HOSPADM

## 2021-11-22 RX ORDER — PANTOPRAZOLE SODIUM 40 MG/1
40 TABLET, DELAYED RELEASE ORAL
Status: DISCONTINUED | OUTPATIENT
Start: 2021-11-22 | End: 2021-11-24 | Stop reason: HOSPADM

## 2021-11-22 RX ORDER — SODIUM CHLORIDE 9 MG/ML
25 INJECTION, SOLUTION INTRAVENOUS PRN
Status: DISCONTINUED | OUTPATIENT
Start: 2021-11-22 | End: 2021-11-24 | Stop reason: HOSPADM

## 2021-11-22 RX ORDER — SODIUM CHLORIDE 0.9 % (FLUSH) 0.9 %
10 SYRINGE (ML) INJECTION PRN
Status: DISCONTINUED | OUTPATIENT
Start: 2021-11-22 | End: 2021-11-24 | Stop reason: HOSPADM

## 2021-11-22 RX ORDER — DIPHENHYDRAMINE HYDROCHLORIDE 50 MG/ML
12.5 INJECTION INTRAMUSCULAR; INTRAVENOUS ONCE
Status: DISCONTINUED | OUTPATIENT
Start: 2021-11-22 | End: 2021-11-22

## 2021-11-22 RX ORDER — SODIUM CHLORIDE 0.9 % (FLUSH) 0.9 %
10 SYRINGE (ML) INJECTION EVERY 12 HOURS SCHEDULED
Status: DISCONTINUED | OUTPATIENT
Start: 2021-11-22 | End: 2021-11-24 | Stop reason: HOSPADM

## 2021-11-22 RX ORDER — GABAPENTIN 300 MG/1
300 CAPSULE ORAL 3 TIMES DAILY
Status: DISCONTINUED | OUTPATIENT
Start: 2021-11-22 | End: 2021-11-24 | Stop reason: HOSPADM

## 2021-11-22 RX ORDER — ASPIRIN 81 MG/1
81 TABLET, CHEWABLE ORAL DAILY
Status: DISCONTINUED | OUTPATIENT
Start: 2021-11-23 | End: 2021-11-24 | Stop reason: HOSPADM

## 2021-11-22 RX ORDER — GABAPENTIN 300 MG/1
900 CAPSULE ORAL 3 TIMES DAILY
COMMUNITY

## 2021-11-22 RX ORDER — FLUOXETINE HYDROCHLORIDE 20 MG/1
40 CAPSULE ORAL DAILY
Status: DISCONTINUED | OUTPATIENT
Start: 2021-11-22 | End: 2021-11-24 | Stop reason: HOSPADM

## 2021-11-22 RX ORDER — ONDANSETRON 4 MG/1
4 TABLET, ORALLY DISINTEGRATING ORAL EVERY 8 HOURS PRN
Status: DISCONTINUED | OUTPATIENT
Start: 2021-11-22 | End: 2021-11-22

## 2021-11-22 RX ORDER — DIVALPROEX SODIUM 125 MG/1
125 CAPSULE, COATED PELLETS ORAL DAILY
Status: DISCONTINUED | OUTPATIENT
Start: 2021-11-22 | End: 2021-11-24 | Stop reason: HOSPADM

## 2021-11-22 RX ORDER — ASPIRIN 325 MG
325 TABLET ORAL DAILY
Status: DISCONTINUED | OUTPATIENT
Start: 2021-11-22 | End: 2021-11-22

## 2021-11-22 RX ORDER — METOPROLOL SUCCINATE 25 MG/1
25 TABLET, EXTENDED RELEASE ORAL DAILY
Status: DISCONTINUED | OUTPATIENT
Start: 2021-11-22 | End: 2021-11-24 | Stop reason: HOSPADM

## 2021-11-22 RX ORDER — ONDANSETRON 2 MG/ML
4 INJECTION INTRAMUSCULAR; INTRAVENOUS EVERY 6 HOURS PRN
Status: DISCONTINUED | OUTPATIENT
Start: 2021-11-22 | End: 2021-11-22

## 2021-11-22 RX ORDER — ATORVASTATIN CALCIUM 40 MG/1
80 TABLET, FILM COATED ORAL NIGHTLY
Status: DISCONTINUED | OUTPATIENT
Start: 2021-11-22 | End: 2021-11-24 | Stop reason: HOSPADM

## 2021-11-22 RX ORDER — HYDROCODONE BITARTRATE AND ACETAMINOPHEN 5; 325 MG/1; MG/1
1 TABLET ORAL EVERY 6 HOURS PRN
Status: DISCONTINUED | OUTPATIENT
Start: 2021-11-22 | End: 2021-11-24 | Stop reason: HOSPADM

## 2021-11-22 RX ORDER — PROCHLORPERAZINE EDISYLATE 5 MG/ML
10 INJECTION INTRAMUSCULAR; INTRAVENOUS EVERY 6 HOURS PRN
Status: DISCONTINUED | OUTPATIENT
Start: 2021-11-22 | End: 2021-11-24 | Stop reason: HOSPADM

## 2021-11-22 RX ORDER — INSULIN GLARGINE 100 [IU]/ML
50 INJECTION, SOLUTION SUBCUTANEOUS 2 TIMES DAILY
Status: DISCONTINUED | OUTPATIENT
Start: 2021-11-22 | End: 2021-11-24 | Stop reason: HOSPADM

## 2021-11-22 RX ORDER — LORAZEPAM 1 MG/1
1 TABLET ORAL
Status: COMPLETED | OUTPATIENT
Start: 2021-11-22 | End: 2021-11-22

## 2021-11-22 RX ORDER — LISINOPRIL 2.5 MG/1
2.5 TABLET ORAL DAILY
Status: DISCONTINUED | OUTPATIENT
Start: 2021-11-22 | End: 2021-11-24 | Stop reason: HOSPADM

## 2021-11-22 RX ORDER — DIPHENHYDRAMINE HCL 25 MG
50 TABLET ORAL ONCE
Status: COMPLETED | OUTPATIENT
Start: 2021-11-22 | End: 2021-11-22

## 2021-11-22 RX ORDER — ATORVASTATIN CALCIUM 40 MG/1
40 TABLET, FILM COATED ORAL DAILY
Status: DISCONTINUED | OUTPATIENT
Start: 2021-11-22 | End: 2021-11-22 | Stop reason: SDUPTHER

## 2021-11-22 RX ORDER — FLUOXETINE HYDROCHLORIDE 20 MG/1
20 CAPSULE ORAL DAILY
Status: DISCONTINUED | OUTPATIENT
Start: 2021-11-22 | End: 2021-11-22

## 2021-11-22 RX ORDER — LABETALOL HYDROCHLORIDE 5 MG/ML
10 INJECTION, SOLUTION INTRAVENOUS EVERY 10 MIN PRN
Status: DISCONTINUED | OUTPATIENT
Start: 2021-11-22 | End: 2021-11-24 | Stop reason: HOSPADM

## 2021-11-22 RX ORDER — HYDROCODONE BITARTRATE AND ACETAMINOPHEN 5; 325 MG/1; MG/1
1 TABLET ORAL EVERY 6 HOURS PRN
COMMUNITY

## 2021-11-22 RX ADMIN — HYDROCODONE BITARTRATE AND ACETAMINOPHEN 1 TABLET: 5; 325 TABLET ORAL at 16:01

## 2021-11-22 RX ADMIN — ATORVASTATIN CALCIUM 80 MG: 40 TABLET, FILM COATED ORAL at 01:02

## 2021-11-22 RX ADMIN — ATORVASTATIN CALCIUM 80 MG: 40 TABLET, FILM COATED ORAL at 22:04

## 2021-11-22 RX ADMIN — FLUOXETINE 40 MG: 20 CAPSULE ORAL at 15:40

## 2021-11-22 RX ADMIN — GABAPENTIN 300 MG: 300 CAPSULE ORAL at 15:40

## 2021-11-22 RX ADMIN — SODIUM CHLORIDE, PRESERVATIVE FREE 10 ML: 5 INJECTION INTRAVENOUS at 08:58

## 2021-11-22 RX ADMIN — LORAZEPAM 1 MG: 1 TABLET ORAL at 10:50

## 2021-11-22 RX ADMIN — ENOXAPARIN SODIUM 40 MG: 40 INJECTION SUBCUTANEOUS at 08:57

## 2021-11-22 RX ADMIN — INSULIN GLARGINE 50 UNITS: 100 INJECTION, SOLUTION SUBCUTANEOUS at 08:58

## 2021-11-22 RX ADMIN — DIPHENHYDRAMINE HCL 50 MG: 25 TABLET ORAL at 22:04

## 2021-11-22 RX ADMIN — INSULIN GLARGINE 50 UNITS: 100 INJECTION, SOLUTION SUBCUTANEOUS at 01:02

## 2021-11-22 RX ADMIN — PANTOPRAZOLE SODIUM 40 MG: 40 TABLET, DELAYED RELEASE ORAL at 08:57

## 2021-11-22 RX ADMIN — GABAPENTIN 300 MG: 300 CAPSULE ORAL at 21:29

## 2021-11-22 RX ADMIN — INSULIN GLARGINE 50 UNITS: 100 INJECTION, SOLUTION SUBCUTANEOUS at 21:29

## 2021-11-22 RX ADMIN — ASPIRIN 325 MG: 325 TABLET ORAL at 08:57

## 2021-11-22 RX ADMIN — METOPROLOL SUCCINATE 25 MG: 25 TABLET, EXTENDED RELEASE ORAL at 15:40

## 2021-11-22 RX ADMIN — LISINOPRIL 2.5 MG: 2.5 TABLET ORAL at 15:39

## 2021-11-22 RX ADMIN — DIVALPROEX SODIUM 125 MG: 125 CAPSULE ORAL at 15:40

## 2021-11-22 RX ADMIN — DIVALPROEX SODIUM 250 MG: 250 TABLET, DELAYED RELEASE ORAL at 21:29

## 2021-11-22 ASSESSMENT — PAIN SCALES - GENERAL
PAINLEVEL_OUTOF10: 7

## 2021-11-22 ASSESSMENT — PAIN DESCRIPTION - DESCRIPTORS: DESCRIPTORS: ACHING

## 2021-11-22 ASSESSMENT — PAIN DESCRIPTION - LOCATION
LOCATION: BACK;SHOULDER
LOCATION: BACK;SHOULDER
LOCATION: BACK;SHOULDER;HEAD
LOCATION: BACK;SHOULDER

## 2021-11-22 ASSESSMENT — PAIN DESCRIPTION - ORIENTATION
ORIENTATION: LEFT

## 2021-11-22 ASSESSMENT — PAIN DESCRIPTION - FREQUENCY: FREQUENCY: CONTINUOUS

## 2021-11-22 ASSESSMENT — PAIN DESCRIPTION - PAIN TYPE
TYPE: ACUTE PAIN

## 2021-11-22 NOTE — PROGRESS NOTES
Hospitalist Progress Note    Date of Admission: 11/21/2021    Chief Complaint: Stroke-like symptoms    Hospital Course:   61 y.o. female, with PMH of HTN, HLD, DM 2, CAD, bipolar disorder, who presented to [facility] with strokelike symptoms. History was obtained from the patient and review of the EMR. The patient states that about 3 days ago, she felt like her left eyelid was very heavy and was having difficulty opening it. She states that this has continued and her vision is getting blurry because of it. Today, her right eyes started having the same effects though states that it is not as bad as the left eye. She also notes RIGHT arm weakness and LEFT leg weakness. She denies any history of stroke or recent injury. She does take full dose aspirin on a daily basis for history of CAD. The patient states that she is also having difficulty walking and states that her gait is \"very off\". She decided to come into the ED for further evaluation. Stroke team was initiated initially but decided on no TPA intervention as the patient symptoms have persisted for a few days now. She will be admitted for further evaluation. Subjective: Ongoing complaint of visual change left > right. Off balance. No obvious weakness. Labs:   Recent Labs     11/21/21 2130 11/22/21  0402   WBC 7.7 8.3   HGB 11.3* 11.4*   HCT 33.0* 33.5*    248     Recent Labs     11/21/21 2130   *   K 4.4   CL 98*   CO2 25   BUN 20   CREATININE 1.0   CALCIUM 9.1     Recent Labs     11/21/21 2130   AST 25   ALT 18   BILITOT 0.4   ALKPHOS 91     Recent Labs     11/21/21  2157   INR 0.99       Physical Exam Performed:    BP (!) 184/97   Pulse 113   Temp 98.5 °F (36.9 °C) (Oral)   Resp 18   Ht 5' 2\" (1.575 m)   Wt 159 lb 9.6 oz (72.4 kg)   SpO2 92%   BMI 29.19 kg/m²     General appearance:  No apparent distress, appears stated age and cooperative. HEENT:  Pupils equal, round, and reactive to light. Conjunctivae/corneas clear. Neck:  Supple, no jugular venous distention. Trachea midline with full range of motion. Respiratory:  Normal respiratory effort. Clear to auscultation, bilaterally without Rales/Wheezes/Rhonchi. Cardiovascular:  Regular rhythm, tachycardic with normal S1/S2 without murmurs, rubs or gallops. Abdomen:  Soft, non-tender, non-distended with normal bowel sounds. Musculoskelatal:  No clubbing, cyanosis or edema bilaterally. Full range of motion without deformity. Neurologic:  Neurovascularly intact without any focal sensory/motor deficits. Cranial nerves: II-XII intact, grossly non-focal.  Psychiatric:  Alert and oriented, thought content appropriate, normal insight  Skin:  Skin color, texture, turgor normal.  No rashes or lesions. Capillary Refill:  Brisk,< 3 seconds   Peripheral Pulses:  +2 palpable, equal bilaterally       Assessment/Plan:    Active Hospital Problems    Diagnosis     Acute CVA (cerebrovascular accident) ([facility] [address] [I63.9]     Stroke-like symptoms [R29.90]      Possible TIA  Symptoms bilateral visual changes, right arm weakness, left leg weakness  - CTH in ED non-acute. - CTA head/neck: Unremarkable  - Brain MRI negative  - ESR elevated at 81 of unclear significance; visual changes are noted, but given constellation of neurologic complaints without other features, there is currently a low clinical suspicion for GCA. 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 - Discussed with Neurology  - Would benefit from close Ophthalmology follow-up   - PT/OT pending    Essential HTN, controlled. - Hold home meds for permissive htn  - Telemetry monitoring    Hx of HLD, controlled with statin. - Continue home Lipitor  - Follow-up with PCP for med adjustments    DM2, uncontrolled.   -  on admission  - Hemoglobin a1c 13 on 11/22/2020, repeat pending  - LDSSI + prandial  - POCT ac/hs  - Hypoglycemia protocol  - Carb control diet    Hx of bipolar disorder, mood currently stable  - Will hold all centrally acting medications overnight      DVT Prophylaxis: lovenox  Diet: ADULT DIET; Regular; 3 carb choices (45 gm/meal);  Low Fat/Low Chol/High Fiber/2 gm Na  Code Status: Full Code  PT/OT Eval Status: Pending    Dispo - 1-2 days    Antione Marinelli MD

## 2021-11-22 NOTE — PLAN OF CARE
Problem: Falls - Risk of:  Goal: Will remain free from falls  Description: Will remain free from falls  Outcome: Ongoing  Goal: Absence of physical injury  Description: Absence of physical injury  Outcome: Ongoing     Problem: Pain:  Goal: Pain level will decrease  Description: Pain level will decrease  Outcome: Ongoing  Goal: Control of acute pain  Description: Control of acute pain  Outcome: Ongoing  Goal: Control of chronic pain  Description: Control of chronic pain  Outcome: Ongoing     Problem: HEMODYNAMIC STATUS  Goal: Patient has stable vital signs and fluid balance  Outcome: Ongoing     Problem: ACTIVITY INTOLERANCE/IMPAIRED MOBILITY  Goal: Mobility/activity is maintained at optimum level for patient  Outcome: Ongoing

## 2021-11-22 NOTE — PROGRESS NOTES
Occupational Therapy   Occupational Therapy Initial Assessment/Treatment   Date: 2021   Patient Name: Michaela Burgos  MRN: [id number]     : 1955    Date of Service: 2021    Discharge Recommendations:  Subacute/Skilled Nursing Facility       Assessment   Performance deficits / Impairments: Decreased functional mobility ; Decreased safe awareness; Decreased balance; Decreased ADL status; Decreased vision/visual deficit  Assessment: Pt presents to [facility] [place] with bilateral eye blurriness. Pt reports having blurriness in L eye and then quickly traveled to R eye as well. Pt reports not having any previous visual deficits prior to admission. Pt states living with son, independent with all self care activities and shares home making responsiblities with son's girlfriend. This date pt is functioning well below pt's baseline level of function and would benefit from further OT skilled rehab in order to achieve therapeutic goals. Treatment Diagnosis: Weakness  Decision Making: Medium Complexity  OT Education: OT Role; ADL Adaptive Strategies; Transfer Training; Family Education  Barriers to Learning: Vision  REQUIRES OT FOLLOW UP: Yes  Activity Tolerance  Activity Tolerance: Patient Tolerated treatment well  Activity Tolerance: /75, HR 98, O2 97%  Safety Devices  Safety Devices in place: Yes  Type of devices: Left in chair; Chair alarm in place; Nurse notified; Call light within reach           Patient Diagnosis(es): The primary encounter diagnosis was Stroke-like symptoms. Diagnoses of Hyperglycemia and Essential hypertension were also pertinent to this visit. has a past medical history of Bipolar affective disorder ([facility] [address] Coronary artery disease involving native coronary artery of native heart without angina pectoris, COVID-19, Depression, Diabetes ([facility] [address] Hyperlipidemia, Hypertension, Insomnia, and Neuropathy. has a past surgical history that includes Cholecystectomy.     Treatment Diagnosis: Weakness      Restrictions  Restrictions/Precautions  Restrictions/Precautions: Fall Risk, Up as Tolerated    Subjective   General  Chart Reviewed: Yes  Family / Caregiver Present: No  Patient Currently in Pain: Yes  Pain Assessment  Pain Assessment: 0-10  Pain Level: 7  Pain Type: Acute pain  Pain Location: Back; Shoulder  Pain Orientation: Left  Pain Descriptors: Aching  Vital Signs  Patient Currently in Pain: Yes  Height and Weight  Height: 5' 2\" (157.5 cm)  Social/Functional History  Social/Functional History  Lives With: Son (Can provide intermittent supervision-works)  Type of Home: Apartment (2nd level apartment)  Home Layout: One level  Home Access: Stairs to enter with rails  Entrance Stairs - Number of Steps: 16  Entrance Stairs - Rails: Both  Bathroom Shower/Tub: Tub/Shower unit  Bathroom Toilet: Standard  Bathroom Equipment: Grab bars in shower  Home Equipment: Cane  ADL Assistance: [address]: Independent (shares responsibilities with son's girlfriend)  Homemaking Responsibilities: Yes  Ambulation Assistance: Independent (uses cane \"sometime\" for ambulation)  Transfer Assistance: Independent  Active : No  Patient's  Info: Son or son's girlfriend  Occupation: Unemployed  Additional Comments: Pt reports having frequent falls       Objective   Vision: Impaired (pt reports blurriness at R eye, unable to see anyting in L eye)  Hearing: Within functional limits       Observation/Palpation  Posture: Fair  Balance  Sitting Balance: Stand by assistance  Standing Balance: Moderate assistance  Standing Balance  Time: 3-4 minutes  Activity: ambulation to/from bathroom, transfers, standing at sink to complete oral hygiene  Comment: no AD, hand held assistance due to low vision  Functional Mobility  Functional - Mobility Device: No device  Activity: To/from bathroom; Other  Assist Level:  Moderate assistance  Functional Mobility Comments: pt demos low vision, pt reports inability to see, however, demos some inconsistencies, pt reached out for object at peripheral vision on R side while ambulating to back to room from hallway  ADL  Feeding: Setup  Grooming: Moderate assistance (pt unable to locate objects on sink, pt unable place toothpaste on toothbrush)  Tone RUE  RUE Tone: Normotonic  Tone LUE  LUE Tone: Normotonic  Coordination  Movements Are Fluid And Coordinated: Yes     Bed mobility  Bridging:  (pt sitting in recliner)  Transfers  Sit to stand: Minimal assistance  Stand to sit: Minimal assistance  Vision - Basic Assessment  Prior Vision: No visual deficits                    LUE PROM (degrees)  LUE PROM: WFL  LUE AROM (degrees)  LUE AROM : WFL  Left Hand PROM (degrees)  Left Hand PROM: WFL  Left Hand AROM (degrees)  Left Hand AROM: WFL  RUE PROM (degrees)  RUE PROM: WFL  RUE AROM (degrees)  RUE AROM : WFL  Right Hand AROM (degrees)  Right Hand AROM: WFL  LUE Strength  L Hand General: 3/5  RUE Strength  R Hand General: 3/5                   Plan   Plan  Times per week: 3-5x/week  Current Treatment Recommendations: Strengthening, Balance Training, Functional Mobility Training, Endurance Training, Patient/Caregiver Education & Training, Safety Education & Training, Self-Care / ADL      AM-PAC Score        AM-[facility] Inpatient Daily Activity Raw Score: 14 (11/22/21 1448)  AM-PAC Inpatient ADL T-Scale Score : 33.39 (11/22/21 1448)  ADL Inpatient CMS 0-100% Score: 59.67 (11/22/21 1448)  ADL Inpatient CMS G-Code Modifier : CK (11/22/21 1448)    Goals  Short term goals  Time Frame for Short term goals: 7 days 11/29  Short term goal 1: Pt will complete grooming and hygiene while standing at sink with CGA  Short term goal 2: Pt will complete BUE HEP tolerating 15 reps  Short term goal 3: Pt will complete toilet transfer with CGA  Patient Goals   Patient goals :  To be able to see normal again       Therapy Time   Individual Concurrent Group Co-treatment   Time In 1250         Time Out 7107 Minutes 28         Timed Code Treatment Minutes: 18 Minutes (10 minutes for eval)       Jose Mail, OT

## 2021-11-22 NOTE — ED NOTES
Pt assisted in using bedpan to urinate. Pt able to lifted herself up to be placed on bedpan and remove necessary clothing w/o difficulty.      [facility]  11/21/21 9255

## 2021-11-22 NOTE — H&P
Hospital Medicine History & Physical      PCP: Gris Talamantes DO, DO    Date of Admission: 11/21/2021    Date of Service: Pt seen/examined on 11/21/2021 and Admitted to observation with expected LOS less than two midnights due to medical therapy. Chief Complaint:    Chief Complaint   Patient presents with    Blurred Vision     starting 3days ago in the left eye, right eye blurred starting this AM at approximately [address]     \"I can't walk right. I feel like I toddler trying to walk\", hit head yesterday on washing machine and left shoulder     History Of Present Illness:      61 y.o. female, with PMH of HTN, HLD, DM 2, CAD, bipolar disorder, who presented to [facility] with strokelike symptoms. History was obtained from the patient and review of the EMR. The patient states that about 3 days ago, she felt like her left eyelid was very heavy and was having difficulty opening it. She states that this has continued and her vision is getting blurry because of it. Today, her right eyes started having the same effects though states that it is not as bad as the left eye. She also notes RIGHT arm weakness and LEFT leg weakness. She denies any history of stroke or recent injury. She does take full dose aspirin on a daily basis for history of CAD. The patient states that she is also having difficulty walking and states that her gait is \"very off\". She decided to come into the ED for further evaluation. Stroke team was initiated initially but decided on no TPA intervention as the patient symptoms have persisted for a few days now. She will be admitted for further evaluation.     Past Medical History:          Diagnosis Date    Bipolar affective disorder ([facility] [address] 9/28/2015    Coronary artery disease involving native coronary artery of native heart without angina pectoris     COVID-19 11/17/2020    [place]    Depression     Diabetes ([facility] [address]     Hyperlipidemia     Hypertension     Insomnia     Neuropathy        Past Surgical History:          Procedure Laterality Date    CHOLECYSTECTOMY         Medications Prior to Admission:      Prior to Admission medications    Medication Sig Start Date End Date Taking? Authorizing Provider   insulin glargine (LANTUS) 100 UNIT/ML injection vial Inject 50 Units into the skin 2 times daily 11/30/20   Rogelio Gallegos MD   insulin lispro (HUMALOG) 100 UNIT/ML injection vial Inject 0-9 Units into the skin nightly 11/30/20   Rogelio Gallegos MD   lisinopril (PRINIVIL;ZESTRIL) 20 MG tablet Take 1 tablet by mouth daily 12/1/20   Rogelio Gallegos MD   amLODIPine (NORVASC) 10 MG tablet Take 1 tablet by mouth daily 12/1/20   Rogelio Gallegos MD   atorvastatin (LIPITOR) 40 MG tablet Take 40 mg by mouth daily    Historical Provider, MD   divalproex (DEPAKOTE) 250 MG DR tablet Take 250 mg by mouth 2 times daily 250 mg at night and 150 mg in the morning    Historical Provider, MD   busPIRone (BUSPAR) 10 MG tablet Take 10 mg by mouth 3 times daily    Historical Provider, MD   QUEtiapine (SEROQUEL) 300 MG tablet TAKE ONE TABLET BY MOUTH ONCE NIGHTLY 11/30/16   Maggi Gravel, DO   omeprazole (PRILOSEC) 20 MG delayed release capsule Take 1 capsule by mouth Daily 11/30/16   Maggi Gravel, DO   FLUoxetine (PROZAC) 20 MG tablet Take 1 tablet by mouth daily 11/30/16   Maggi Gravel, DO   glucose blood VI test strips (EVA CONTOUR TEST) strip 1 each by In Vitro route daily As needed. 8/29/16   Maggi Gravel, DO   Insulin Pen Needle 32G X 4 MM MISC 4 times a day 8/19/16   Mgagi Gravel, DO   aspirin 325 MG tablet Take 1 tablet by mouth daily 6/3/16   Maggi Gravel, DO       Allergies: Iv contrast [iodides] and Pcn [penicillins]    Social History:      The patient currently lives independently. TOBACCO:   reports that she has never smoked. She has never used smokeless tobacco.  ETOH:   reports no history of alcohol use. Family History:      Reviewed in detail.  Positive as follows:        Problem Relation Age of Onset    Heart Disease Mother     Heart Disease Father        REVIEW OF SYSTEMS:   Pertinent positives as noted in the HPI. All other systems reviewed and negative. PHYSICAL EXAM PERFORMED:    BP (!) 150/77   Pulse 94   Temp 98.9 °F (37.2 °C) (Oral)   Resp 18   Ht 5' 2\" (1.575 m)   Wt 167 lb 3.2 oz (75.8 kg)   SpO2 96%   BMI 30.58 kg/m²     General appearance:  No apparent distress, appears stated age and cooperative. HEENT:  Normal cephalic, atraumatic without obvious deformity. Pupils equal, round, and reactive to light. Extra ocular muscles intact though persistent left eye ptosis. Conjunctivae/corneas clear. Neck: Supple, with full range of motion. No jugular venous distention. Trachea midline. Respiratory:  Normal respiratory effort. Clear to auscultation, bilaterally without Rales/Wheezes/Rhonchi. Cardiovascular:  Regular rate and rhythm with normal S1/S2 without murmurs, rubs or gallops. Abdomen: Soft, non-tender, non-distended with normal bowel sounds. Musculoskeletal:  No clubbing, cyanosis or edema bilaterally. Full range of motion without deformity. Skin: Skin color, texture, turgor normal.  No rashes or lesions. Neurologic:  Neurovascularly intact without any focal sensory/motor deficits. Cranial nerves: II-XII intact, grossly non-focal. Left leg weakness 2/5 noted.   Psychiatric:  Alert and oriented, thought content appropriate, normal insight  Capillary Refill: Brisk,< 3 seconds   Peripheral Pulses: +2 palpable, equal bilaterally       Labs:     Recent Labs     11/21/21 2130   WBC 7.7   HGB 11.3*   HCT 33.0*        Recent Labs     11/21/21 2130   *   K 4.4   CL 98*   CO2 25   BUN 20   CREATININE 1.0   CALCIUM 9.1     Recent Labs     11/21/21 2130   AST 25   ALT 18   BILITOT 0.4   ALKPHOS 91     Recent Labs     11/21/21 2157   INR 0.99     Recent Labs     11/21/21 2130   TROPONINI <0.01       Urinalysis:      Lab Results Component Value Date    NITRU POSITIVE 11/19/2020    WBCUA  11/19/2020    BACTERIA 1+ 11/19/2020    RBCUA 21-50 11/19/2020    BLOODU SMALL 11/19/2020    SPECGRAV 1.015 11/19/2020    GLUCOSEU >=1000 11/19/2020       Radiology:     CXR: I have reviewed the CXR with the following interpretation: n/a  EKG:  I have reviewed the EKG with the following interpretation: NSR, prolonged qtc    CTA HEAD NECK W CONTRAST   Final Result   1. No large vessel intracranial occlusion or high grade stenosis. 2. No significant stenoses of the internal carotid arteries. 3. Other findings as described. CT Head WO Contrast   Final Result   Addendum 1 of 1   ADDENDUM:   Critical results were called by Dr. Kamila Valencia DO to Dr. Emmy Pichardo    on   11/21/2021 at 21:37. Final   No acute hemorrhage or large intracranial mass effect. Aspects 10. ASSESSMENT:    Active Hospital Problems    Diagnosis Date Noted    Stroke-like symptoms [R29.90] 11/21/2021         PLAN:    TIA vs CVA - symptoms include left eye ptosis, right arm weakness, left leg weakness  - CTH in ED non-acute. - CTA head/neck: Unremarkable  - Continue home ASA and atorvastatin. - Allow for permissive HTN in the setting of possible CVA. Hold home meds. PRN labetalol for SBP > 220.  - MRI brain without contrast pending  - Monitor on tele. - Consult neurology for further recs - appreciated in advance.  - PT/OT not ordered as no current deficits    Essential HTN, controlled. - Hold home meds for permissive htn  - Telemetry monitoring    Hx of HLD, controlled with statin. - Continue home Lipitor  - Follow-up with PCP for med adjustments    DM2, uncontrolled.   -  on admission  - Hemoglobin a1c 13 on 11/22/2020, repeat pending  - LDSSI + prandial  - POCT ac/hs  - Hypoglycemia protocol  - Carb control diet    Hx of bipolar disorder, mood currently stable  - Will hold all centrally acting medications overnight      DVT Prophylaxis: lovenox  Diet: No diet orders on file  Code Status: Prior    PT/OT Eval Status: not ordered    Dispo - pending neuro eval and MRI     Roxanna Khan - TALYA    Thank you Beau Pineda DO, DO for the opportunity to be involved in this patient's care.  If you have any questions or concerns please feel free to contact me at [phone].  -------------------------Dr. Cameron Miller----------------------------

## 2021-11-22 NOTE — ED NOTES
Report given to [facility], no further questions at this time. Patient transported to inpatient floor on cardiac monitoring via stretcher.      Lizzy Marcelino, [address] [facility]  11/22/21 3424

## 2021-11-22 NOTE — PROGRESS NOTES
Physical Therapy    Facility/Department: [facility] B[facility] - MED SURG  Initial Assessment    NAME: Saravanan Tucker  : 1955  MRN: [id number]    Date of Service: 2021    Discharge Recommendations:  Subacute/Skilled Nursing Facility   PT Equipment Recommendations  Equipment Needed: No    Assessment   Body structures, Functions, Activity limitations: Decreased functional mobility ; Decreased balance; Decreased endurance; Decreased coordination  Assessment: Pt is 60 yo female who presents with diagnosis of essential HTN. MRI negative for acute infarct. Reports significant decline in vision as well as LLE and RUE weakness. Strength equal BLE. Increased tremors LLE noted with MMT. No tremors noted at rest or with mobility. CGA to min A for gait training with HHA d/t vision deficits. Frequent cues to keep eyes open during gait training. Pt would benefit from continued skilled therapy to address deficits. Recommend SNF at d/c due to history of falls, visual deficits, and 16 steps to apartment. Treatment Diagnosis: impaired functional mobility  Specific instructions for Next Treatment: progress mobility as tolerated  Prognosis: Good  Decision Making: Low Complexity  PT Education: Goals; General Safety; Gait Training; PT Role; Disease Specific Education; Plan of Care; Functional Mobility Training; Transfer Training  Disease Specific Education: Pt educated on importance of OOB mobility, prevention of complications of bedrest, and general safety during hospitalization. Pt verbalized understanding  Barriers to Learning: none  REQUIRES PT FOLLOW UP: Yes  Activity Tolerance  Activity Tolerance: Patient Tolerated treatment well; Patient limited by fatigue; Patient limited by endurance  Activity Tolerance: /86, ; SpO2 97% on RA       Patient Diagnosis(es): The primary encounter diagnosis was Stroke-like symptoms. Diagnoses of Hyperglycemia and Essential hypertension were also pertinent to this visit.      has a past medical history of Bipolar affective disorder ([facility] [address] Coronary artery disease involving native coronary artery of native heart without angina pectoris, COVID-19, Depression, Diabetes ([facility] [address] Hyperlipidemia, Hypertension, Insomnia, and Neuropathy. has a past surgical history that includes Cholecystectomy. Restrictions  Restrictions/Precautions  Restrictions/Precautions: Fall Risk, Up as Tolerated  Vision/Hearing  Vision: Impaired (blurred vision in right eye, limited to no vision in left eye per pt report. States at baseline she has no trouble seeing and does not wear glasses)  Hearing: Within functional limits     Subjective  General  Chart Reviewed: Yes  Patient assessed for rehabilitation services?: Yes  Additional Pertinent Hx: MRI negative for CVA  Response To Previous Treatment: Not applicable  Family / Caregiver Present: No  Referring Practitioner: Dr. Ilan Reese MD  Referral Date : 11/22/21  Diagnosis: Essential HTN,  Follows Commands: Within Functional Limits  General Comment  Comments: Pt up in chair on approach; RN cleared pt for therapy  Subjective  Subjective: pt agreeable to therapy  Pain Screening  Patient Currently in Pain: Yes  Pain Assessment  Pain Assessment: 0-10  Pain Level: 7  Pain Type: Acute pain  Pain Location: Back;  Shoulder  Pain Orientation: Left  Non-Pharmaceutical Pain Intervention(s): Ambulation/Increased Activity; Repositioned  Response to Pain Intervention: Patient Satisfied    Orientation  Orientation  Overall Orientation Status: Within Functional Limits  Social/Functional History  Social/Functional History  Lives With: Son (Can provide intermittent supervision-works)  Type of Home: Apartment (2nd level apartment)  Home Layout: One level  Home Access: Stairs to enter with rails  Entrance Stairs - Number of Steps: 16  Entrance Stairs - Rails: Both  Bathroom Shower/Tub: Tub/Shower unit  Bathroom Toilet: Standard  Bathroom Equipment: Grab bars in shower  Home Equipment: Romel Duncan  ADL Assistance: Independent  Homemaking Assistance: Independent (shares responsibilities with son's girlfriend)  Homemaking Responsibilities: Yes  Ambulation Assistance: Independent (uses cane \"sometime\" for ambulation)  Transfer Assistance: Independent  Active : No  Patient's  Info: Son or son's girlfriend  Occupation: Unemployed  Additional Comments: Pt reports having frequent falls    Objective      AROM RLE (degrees)  RLE AROM: WFL  AROM LLE (degrees)  LLE AROM : WFL  Strength RLE  Comment: Grossly 4+/5 throughout  Strength LLE  Comment: Grossly 4+/5 throughout -- increased tremors with MMT only -- tremors not observed with mobility or at rest     Sensation  Overall Sensation Status: WFL (denies N/T)     Bed mobility  Supine to Sit: Unable to assess  Sit to Supine: Unable to assess  Comment: Pt up in chair at start and end of session     Transfers  Sit to Stand: Contact guard assistance (from chair, no device)  Stand to sit: Contact guard assistance     Ambulation  Ambulation?: Yes  Ambulation 1  Surface: level tile  Device: Hand-Held Assist  Assistance: Contact guard assistance; Minimal assistance  Quality of Gait: CGA-min A for balance. HHA d/t impaired vision. Able to see objects in room clearly enough to reach out for support and stability. Demonstrates shufffling gait with narrow BERT. Min A d/t one instance of scissoring gait. Gait Deviations: Shuffles;  Slow Breana; Decreased step length; Decreased step height  Distance: 10 ft + 100 ft     Balance  Sitting - Static: Good  Sitting - Dynamic: Good  Standing - Static: Fair  Standing - Dynamic: [address]  Times per week: 3-5x/wk  Times per day: Daily  Specific instructions for Next Treatment: progress mobility as tolerated  Current Treatment Recommendations: Strengthening, Neuromuscular Re-education, Home Exercise Program, Safety Education & Training, Balance Training, Endurance Training, Functional Mobility Training, Transfer Training, Gait Training, Equipment Evaluation, Education, & procurement, Patient/Caregiver Education & Training, Stair training  Safety Devices  Type of devices: All fall risk precautions in place, Call light within reach, Chair alarm in place, Gait belt, Patient at risk for falls, Nurse notified, Left in chair                        AM-PAC Score  AM-PAC Inpatient Mobility Raw Score : 17 (11/22/21 1500)  AM-PAC Inpatient T-Scale Score : 42.13 (11/22/21 1500)  Mobility Inpatient CMS 0-100% Score: 50.57 (11/22/21 1500)  Mobility Inpatient CMS G-Code Modifier : CK (11/22/21 1500)          Goals  Short term goals  Time Frame for Short term goals: 1 week (11/29) unless otherwise specified  Short term goal 1: Pt will be mod I with bed mobility. Short term goal 2: Pt will be SBA for transfers with LRAD. Short term goal 3: Pt will ambulate 100 ft with SBA and LRAD. Short term goal 4: 11/26: Pt will participate in 12-15 reps of BLE exercises to promote strength and activity tolerance. Patient Goals   Patient goals : \"to get better\"       Therapy Time   Individual Concurrent Group Co-treatment   Time In 2602         Time Out 1312         Minutes 20         Timed Code Treatment Minutes: [facility], PT, DPT  If pt is unable to be seen after this session, please let this note serve as discharge summary. Please see case management note for discharge disposition. Thank you.

## 2021-11-22 NOTE — CARE COORDINATION
CASE MANAGEMENT INITIAL ASSESSMENT      Reviewed chart and completed assessment with patient     Explained Case Management role/services. yes    Health Care Decision Maker :   Primary Decision Maker: Kentrell Case Child - [phone]        Admit date/status: observation 11/21/21  Diagnosis: essential hypertension     Is this a Readmission?:  No      Insurance: BCBS Medicare     Precert required for SNF: Yes       3 night stay required: No    Living arrangements, Adls, care needs, prior to admission: lives with son, Gayatri, in second floor apt. 16 steps to enter. Does not drive d/t being legally blind. Son drives. Transportation: likely family     Durable Medical Equipment at home:  Walker__Cane_x_RTS__ BSC__Shower Chair__  02__ HHN__ CPAP__  BiPap__  Hospital Bed__ W/C__    Services in the home and/or outpatient, prior to admission: none    PT/OT recs: not completed at this time    [address] Notification (HEN): not started    Barriers to discharge: none    Plan/comments: pt denies needs at discharge. Pt did mention she would like a therapy evaluation to \"help her walk better\" CM will mention during 11am huddle. No other needs identified.      Juan Murillo RN

## 2021-11-22 NOTE — PROGRESS NOTES
Comprehensive Nutrition Assessment    Type and Reason for Visit:  Initial, Positive Nutrition Screen (MST=2: weight loss, decreased appetite, poor dentition)    Nutrition Recommendations/Plan:   1. Liberalize diet to 4 carb choices, no added salt   2. Encourage PO intakes   3. Monitor further diet education needs   4. Monitor nutrition adequacy, pertinent labs, bowel habits, wt changes, and clinical progress    Nutrition Assessment:  60 yo female admitted with stroke like symptoms. Plans for MRI today. Hx of CAD, COVID, DM, HLD and HTN. Pt nutritionally compromised AEB weight loss and decreased PO intakes PTA. Pt reports increased stress and decreased PO intakes PTA. Reports 12 lb weight loss in 2 weeks, FABRIZIO d/t stated weight hx. Encouraged PO intakes. Pt declined ONS. Pt expresses concerns with labile blood sugars at home. Discussed carb control diet, encouraged 3 meals/day and well balanced meals. Handouts on carb control diet and OP diabetes educators provided to pt. Malnutrition Assessment:  Malnutrition Status: At risk for malnutrition (Comment)    Context:  Acute Illness       Estimated Daily Nutrient Needs:  Energy (kcal):  0498-6771 kcal; Weight Used for Energy Requirements:  Current (72 kg)     Protein (g):  72-86 g; Weight Used for Protein Requirements:  Current (1.0-1.2 g/kg)        Fluid (ml/day):   ; Method Used for Fluid Requirements:  1 ml/kcal      Nutrition Related Findings:  BG elevated. No current A1c. Na 135. Wounds:  None       Current Nutrition Therapies:    ADULT DIET; Regular; 3 carb choices (45 gm/meal); Low Fat/Low Chol/High Fiber/2 gm Na    Anthropometric Measures:  · Height: 5' 2\" (157.5 cm)  · Current Body Weight: 159 lb (72.1 kg)   · Ideal Body Weight: 110 lbs; % Ideal Body Weight 144.5 %   · BMI: 29.1  · BMI Categories: Overweight (BMI 25.0-29. 9)       Nutrition Diagnosis:   · Inadequate oral intake related to inadequate protein-energy intake as evidenced by poor intake prior to admission, weight loss      Nutrition Interventions:   Food and/or Nutrient Delivery:  Modify Current Diet  Nutrition Education/Counseling:  Education completed   Coordination of Nutrition Care:  Continue to monitor while inpatient    Goals:  Pt will consume and tolerate greater than 50% of meals this admission w/o further weight loss       Nutrition Monitoring and Evaluation:   Behavioral-Environmental Outcomes:  None Identified   Food/Nutrient Intake Outcomes:  Food and Nutrient Intake  Physical Signs/Symptoms Outcomes:  Biochemical Data, Weight     Discharge Planning:    Continue current diet     Electronically signed by Sabas Santana, MS, RD, LD on 11/22/21 at 1:41 PM EST    Contact: [phone]

## 2021-11-22 NOTE — ED PROVIDER NOTES
CHIEF COMPLAINT  Blurred Vision (starting 3days ago in the left eye, right eye blurred starting this AM at approximately 9am) and Fall (\"I can't walk right. I feel like I toddler trying to walk\", hit head yesterday on washing machine and left shoulder)      HISTORY OF PRESENT ILLNESS  Kaleb Robles is a 61 y.o. female with a history of CAD, diabetes, depression, bipolar disorder, hypertension, hyperlipidemia who presents for department for evaluation of strokelike symptoms. Patient states that starting 3 days ago she has had difficulty opening her left eye. She states that because of this her vision is slightly blurry. She also reports today that her right arm seems to be weaker than her left and this is a new development. She describes headache. She denies any recent falls or injuries. She is on a full dose aspirin. No nausea or vomiting. No prior history of stroke. Point-of-care glucose per EMS in the 200s. Her type 2 diabetes is fairly uncontrolled. No other complaints, modifying factors or associated symptoms. Past Medical History:   Diagnosis Date    Bipolar affective disorder ([facility] [address] 9/28/2015    Coronary artery disease involving native coronary artery of native heart without angina pectoris     COVID[facility] 11/17/2020    [facility]     Diabetes ([facility] [address]     Hyperlipidemia     Hypertension     Insomnia     Neuropathy      Past Surgical History:   Procedure Laterality Date    CHOLECYSTECTOMY       Family History   Problem Relation Age of Onset    Heart Disease Mother     Heart Disease Father      Social History     Socioeconomic History    Marital status:       Spouse name: Not on file    Number of children: Not on file    Years of education: Not on file    Highest education level: Not on file   Occupational History    Not on file   Tobacco Use    Smoking status: Never Smoker    Smokeless tobacco: Never Used   Vaping Use    Vaping Use: Never used   Substance and Sexual Activity    Alcohol use: No    Drug use: No    Sexual activity: Not Currently   Other Topics Concern    Not on file   Social History Narrative    Not on file     Social Determinants of Health     Financial Resource Strain:     Difficulty of Paying Living Expenses: Not on file   Food Insecurity:     Worried About Running Out of Food in the Last Year: Not on file    Jeimy of Food in the Last Year: Not on file   Transportation Needs:     Lack of Transportation (Medical): Not on file    Lack of Transportation (Non-Medical):  Not on file   Physical Activity:     Days of Exercise per Week: Not on file    Minutes of Exercise per Session: Not on file   Stress:     Feeling of Stress : Not on file   Social Connections:     Frequency of Communication with Friends and Family: Not on file    Frequency of Social Gatherings with Friends and Family: Not on file    Attends [employer] Services: Not on file    Active Member of [address] [place] [address] or Organizations: Not on file    Attends Club or Organization Meetings: Not on file    Marital Status: Not on file   Intimate Partner Violence:     Fear of Current or Ex-Partner: Not on file    Emotionally Abused: Not on file    Physically Abused: Not on file    Sexually Abused: Not on file   Housing Stability:     Unable to Pay for Housing in the Last Year: Not on file    [facility]: Not on file    Unstable Housing in the Last Year: Not on file     Current Facility-Administered Medications   Medication Dose Route Frequency Provider Last Rate Last Admin    aspirin tablet 325 mg  325 mg Oral Daily MOHAN Ferris NP        insulin glargine (LANTUS) injection vial 50 Units  50 Units SubCUTAneous BID MOHAN Ferris NP   50 Units at 11/22/21 0102    pantoprazole (PROTONIX) tablet 40 mg  40 mg Oral QAM AC MOHAN Etienne NP        sodium chloride flush 0.9 % injection 10 mL  10 mL IntraVENous 2 times per day MOHAN Ferris NP acute deformities. SKIN: Warm and dry. No acute rashes. NEUROLOGICAL: Neuro:  CN I not assessed. Pupils are equal, round and reactive to light bilaterally. Partial gaze palsy, Extraocular motions are full and intact bilaterally. Facial sensation to light touch in the trigeminal distribution is intact bilaterally. Patient able to raise bilateral eyebrows. Ptosis of the left eyelid symmetric smile. Tongue protrudes midline. Able to puff out cheeks bilaterally. Shrugs shoulders bilaterally. Speech is clear and fluent. No difficulty with word finding. Test of skew: negative . Truncal ataxia is not present. Strength is 5/5 in the bilateral upper extremities (flexion and extension at elbow, wrist, intact and strong  of the hands). Strength is 4+/5 in the bilateral lower extremities (flexion and extension at hip, knee and plantarflexion and dorsiflexion at the ankle), intact sensation to light touch in all four extremities. There is mild right sided pronator drift. Finger to nose testing is not intact. Some inattention needing frequent redirection. NIHSS: 3 secondary to unilateral facial weakness, lower extremity weakness, pronator drift. LABS  I have reviewed all labs for this visit.    Results for orders placed or performed during the hospital encounter of 11/21/21   Protime-INR   Result Value Ref Range    Protime 11.2 9.9 - 12.7 sec    INR 0.99 0.88 - 1.12   CBC Auto Differential   Result Value Ref Range    WBC 7.7 4.0 - 11.0 K/uL    RBC 3.81 (L) 4.00 - 5.20 M/uL    Hemoglobin 11.3 (L) 12.0 - 16.0 g/dL    Hematocrit 33.0 (L) 36.0 - 48.0 %    MCV 86.6 80.0 - 100.0 fL    MCH 29.7 26.0 - 34.0 pg    MCHC 34.3 31.0 - 36.0 g/dL    RDW 14.9 12.4 - 15.4 %    Platelets 899 511 - 404 K/uL    MPV 8.0 5.0 - 10.5 fL    Neutrophils % 45.2 %    Lymphocytes % 42.0 %    Monocytes % 7.8 %    Eosinophils % 3.8 %    Basophils % 1.2 %    Neutrophils Absolute 3.5 1.7 - 7.7 K/uL    Lymphocytes Absolute 3.2 1.0 - 5.1 K/uL Monocytes Absolute 0.6 0.0 - 1.3 K/uL    Eosinophils Absolute 0.3 0.0 - 0.6 K/uL    Basophils Absolute 0.1 0.0 - 0.2 K/uL   Comprehensive Metabolic Panel   Result Value Ref Range    Sodium 135 (L) 136 - 145 mmol/L    Potassium 4.4 3.5 - 5.1 mmol/L    Chloride 98 (L) 99 - 110 mmol/L    CO2 25 21 - 32 mmol/L    Anion Gap 12 3 - 16    Glucose 248 (H) 70 - 99 mg/dL    BUN 20 7 - 20 mg/dL    CREATININE 1.0 0.6 - 1.2 mg/dL    GFR Non- 56 (A) >60    GFR African American >60 >60    Calcium 9.1 8.3 - 10.6 mg/dL    Total Protein 6.8 6.4 - 8.2 g/dL    Albumin 3.7 3.4 - 5.0 g/dL    Albumin/Globulin Ratio 1.2 1.1 - 2.2    Total Bilirubin 0.4 0.0 - 1.0 mg/dL    Alkaline Phosphatase 91 40 - 129 U/L    ALT 18 10 - 40 U/L    AST 25 15 - 37 U/L   Troponin   Result Value Ref Range    Troponin <0.01 <0.01 ng/mL   Sample possible blood bank testing   Result Value Ref Range    Specimen Status CARYN    Lipase   Result Value Ref Range    Lipase 15.0 13.0 - 60.0 U/L   POCT Glucose   Result Value Ref Range    POC Glucose 254 (H) 70 - 99 mg/dl    Performed on ACCU-CHEK    POCT Glucose   Result Value Ref Range    POC Glucose 259 (H) 70 - 99 mg/dl    Performed on ACCU-CHEK    EKG 12 Lead   Result Value Ref Range    Ventricular Rate 97 BPM    Atrial Rate 97 BPM    P-R Interval 178 ms    QRS Duration 86 ms    Q-T Interval 410 ms    QTc Calculation (Bazett) 520 ms    P Axis 48 degrees    R Axis 48 degrees    T Axis 51 degrees    Diagnosis       Normal sinus rhythmProlonged QTAbnormal ECGWhen compared with ECG of 19-NOV-2020 22:01,No significant change was found       EKG  The Ekg interpreted by myself in the emergency department in the absence of a cardiologist.  normal sinus rhythm with a rate of 97  Axis is   Normal  QTc is  520  Intervals and Durations are unremarkable. No specific ST-T wave changes appreciated.   Nonspecific ST changes in leads III, aVL unchanged compared to prior EKG dated November 19, 2020  No evidence of acute ischemia. RADIOLOGY  X-RAYS:  I have reviewed radiologic plain film image(s). ALL OTHER NON-PLAIN FILM IMAGES SUCH AS CT, ULTRASOUND AND MRI HAVE BEEN READ BY THE RADIOLOGIST. CTA HEAD NECK W CONTRAST   Final Result   1. No large vessel intracranial occlusion or high grade stenosis. 2. No significant stenoses of the internal carotid arteries. 3. Other findings as described. CT Head WO Contrast   Final Result   Addendum 1 of 1   ADDENDUM:   Critical results were called by Dr. Mariella Tineo DO to Dr. Nadine Edward    on   11/21/2021 at 21:37. Final   No acute hemorrhage or large intracranial mass effect. Aspects 10. MRI brain without contrast    (Results Pending)            Critical Care: Total critical care time is 25 minutes, which excludes separately billable procedures and updating family. Time spent is specifically for management of the presenting complaint and symptoms initially, direct bedside care, reevaluation, review of records, and consultation. There was a high probability of clinically significant life-threatening deterioration in the patient's condition, which required my urgent intervention. ED COURSE/MDM  This is a 62-year-old female who presents for evaluation of 3 days of left-sided eye weakness, progressing with headache, right-sided subjective weakness. Patient arrives with stable vital signs. Patient was evaluated immediately upon her arrival and was sent to the CT scan for CT head, CTA angio. Code stroke was called. NIH stroke scale score is currently a 3 secondary to unilateral facial weakness, mild pronator drift. I do not appreciate right-sided weakness on exam.  ED evaluation clued basic labs, cardiac panel in addition to CT imaging.   I discussed her care with the  stroke team and because her symptoms have been ongoing for the past 3 days, she is outside of TPA window and not a candidate. Laboratory evaluation revealed mild hyperglycemia to 240. Troponin negative. Hemoglobin 11.3. CT, CTA imaging does not reveal acute process or large vessel occlusion. On repeat exam, neurological exam is stable with continued pronator drift, past pointing on finger-to-nose. She will require admission for MRI, continued stroke work-up. Patient is agreeable with this. Patient admitted in stable condition. CLINICAL IMPRESSION  1. Stroke-like symptoms    2. Hyperglycemia    3. Essential hypertension        Blood pressure (!) 184/84, pulse 96, temperature 98 °F (36.7 °C), temperature source Oral, resp. rate 19, height 5' 2\" (1.575 m), weight 159 lb 9.6 oz (72.4 kg), SpO2 95 %, not currently breastfeeding. DISPOSITION  Radha Parker was admitted in stable condition. This chart was generated in part by using Dragon Dictation system and may contain errors related to that system including errors in grammar, punctuation, and spelling, as well as words and phrases that may be inappropriate. If there are any questions or concerns please feel free to contact the dictating provider for clarification.      Julio Blake MD  11/22/21 [id number]

## 2021-11-22 NOTE — CONSULTS
memory  Normal attention span and concentration. Language: intact naming, repeating and fluency   Good fund of Knowledge. Aware of current events and vocabulary   Cranial Nerves:   II: Visual fields: Full. Pupils: equal, round, reactive to light  III,IV,VI: Extra Ocular Movements are intact. No nystagmus  V: Facial sensation is intact  VII: Facial strength and movements: intact and symmetric  VIII: Hearing: Intact  IX: Palate elevation is symmetric  XI: Shoulder shrug is intact  XII: Tongue movements are normal  Musculoskeletal: 4/5 in all 4 extremities. Poor effort. Tone: Normal tone. Reflexes: Symmetric 2+ in the arms and 2+ in the legs   Planters: flexor bilaterally. Coordination: no pronator drift, no dysmetria with FNF in upper extremities. Normal REM. Sensation: normal to all modalities in both arms and legs. Gait/Posture: did not test gait. Data:  LABS:   Lab Results   Component Value Date     11/21/2021    K 4.4 11/21/2021    K 4.1 11/19/2020    CL 98 11/21/2021    CO2 25 11/21/2021    BUN 20 11/21/2021    CREATININE 1.0 11/21/2021    GFRAA >60 11/21/2021    LABGLOM 56 11/21/2021    GLUCOSE 248 11/21/2021    PHOS 4.0 11/26/2020    MG 2.10 11/19/2020    CALCIUM 9.1 11/21/2021     Lab Results   Component Value Date    WBC 8.3 11/22/2021    RBC 3.87 11/22/2021    HGB 11.4 11/22/2021    HCT 33.5 11/22/2021    MCV 86.6 11/22/2021    RDW 14.6 11/22/2021     11/22/2021     Lab Results   Component Value Date    INR 0.99 11/21/2021    PROTIME 11.2 11/21/2021       Neuroimaging was independently reviewed by myself and discussed results with the patient and/or family  Reviewed notes from different physicians  Reviewed lab and blood testing    Impression:    Multiple neurological complaints including bilateral vision changes, right arm weakness, left leg weakness, and unsteady gait. Her exam is inconsistent.   CTH and CTA head and neck are unremarkable.,  Bipolar disorder  HTN  HLD  DM2    Recommendation:    Given risk factors, will rule out CVA with MRI of brain. ECHO. Monitor on tele. Continue ASA, statin. Continue home BP meds. F/u A1c, lipid panel. Check ESR, CRP, B12, folate, and TSH. Improved glycemic control. SSI coverage while inpatient. PT/OT/SLP      Further recommendations following testing. Thank you for referring such patient. If you have any questions regarding my consult note, please don't hesitate to call me. Zelalem Suarez, MERCEDES    This dictation was generated by voice recognition computer software.  Although all attempts are made to edit the dictation for accuracy, there may be errors in the  transcription that are not intended

## 2021-11-22 NOTE — PROGRESS NOTES
MRI checklist faxed at this time. RN noted pt marked yes for claustrophobia. RN sent perfect serve to Estuardo Collado MD requesting something PRN for MRI. Will look for orders.

## 2021-11-22 NOTE — PLAN OF CARE
Problem: Nutrition  Goal: Optimal nutrition therapy  Outcome: Ongoing  Note: Nutrition Problem #1: Inadequate oral intake  Intervention: Food and/or Nutrient Delivery: Modify Current Diet  Nutritional Goals: Pt will consume and tolerate greater than 50% of meals this admission w/o further weight loss

## 2021-11-23 PROBLEM — R53.1 GENERALIZED WEAKNESS: Status: ACTIVE | Noted: 2021-11-23

## 2021-11-23 LAB
ANTI-NUCLEAR ANTIBODY (ANA): NEGATIVE
GLUCOSE BLD-MCNC: 119 MG/DL (ref 70–99)
GLUCOSE BLD-MCNC: 141 MG/DL (ref 70–99)
GLUCOSE BLD-MCNC: 143 MG/DL (ref 70–99)
GLUCOSE BLD-MCNC: 224 MG/DL (ref 70–99)
PERFORMED ON: ABNORMAL
RHEUMATOID FACTOR: <10 IU/ML
TSH REFLEX: 0.92 UIU/ML (ref 0.27–4.2)

## 2021-11-23 PROCEDURE — 6370000000 HC RX 637 (ALT 250 FOR IP): Performed by: INTERNAL MEDICINE

## 2021-11-23 PROCEDURE — 99233 SBSQ HOSP IP/OBS HIGH 50: CPT | Performed by: NURSE PRACTITIONER

## 2021-11-23 PROCEDURE — 97110 THERAPEUTIC EXERCISES: CPT

## 2021-11-23 PROCEDURE — 6370000000 HC RX 637 (ALT 250 FOR IP): Performed by: NURSE PRACTITIONER

## 2021-11-23 PROCEDURE — 97116 GAIT TRAINING THERAPY: CPT

## 2021-11-23 PROCEDURE — 96372 THER/PROPH/DIAG INJ SC/IM: CPT

## 2021-11-23 PROCEDURE — 2580000003 HC RX 258: Performed by: NURSE PRACTITIONER

## 2021-11-23 PROCEDURE — 6360000002 HC RX W HCPCS: Performed by: NURSE PRACTITIONER

## 2021-11-23 PROCEDURE — G0378 HOSPITAL OBSERVATION PER HR: HCPCS

## 2021-11-23 RX ORDER — LOPERAMIDE HYDROCHLORIDE 2 MG/1
2 CAPSULE ORAL 4 TIMES DAILY PRN
Status: DISCONTINUED | OUTPATIENT
Start: 2021-11-23 | End: 2021-11-24 | Stop reason: HOSPADM

## 2021-11-23 RX ORDER — LORAZEPAM 0.5 MG/1
0.5 TABLET ORAL EVERY 4 HOURS PRN
Status: DISCONTINUED | OUTPATIENT
Start: 2021-11-23 | End: 2021-11-24 | Stop reason: HOSPADM

## 2021-11-23 RX ADMIN — ENOXAPARIN SODIUM 40 MG: 40 INJECTION SUBCUTANEOUS at 08:58

## 2021-11-23 RX ADMIN — LORAZEPAM 0.5 MG: 0.5 TABLET ORAL at 21:15

## 2021-11-23 RX ADMIN — FLUOXETINE 40 MG: 20 CAPSULE ORAL at 08:57

## 2021-11-23 RX ADMIN — HYDROCODONE BITARTRATE AND ACETAMINOPHEN 1 TABLET: 5; 325 TABLET ORAL at 09:00

## 2021-11-23 RX ADMIN — ATORVASTATIN CALCIUM 80 MG: 40 TABLET, FILM COATED ORAL at 21:15

## 2021-11-23 RX ADMIN — PANTOPRAZOLE SODIUM 40 MG: 40 TABLET, DELAYED RELEASE ORAL at 05:48

## 2021-11-23 RX ADMIN — DIVALPROEX SODIUM 250 MG: 250 TABLET, DELAYED RELEASE ORAL at 21:15

## 2021-11-23 RX ADMIN — LORAZEPAM 0.5 MG: 0.5 TABLET ORAL at 15:14

## 2021-11-23 RX ADMIN — GABAPENTIN 300 MG: 300 CAPSULE ORAL at 21:15

## 2021-11-23 RX ADMIN — GABAPENTIN 300 MG: 300 CAPSULE ORAL at 08:58

## 2021-11-23 RX ADMIN — LISINOPRIL 2.5 MG: 2.5 TABLET ORAL at 08:58

## 2021-11-23 RX ADMIN — INSULIN GLARGINE 50 UNITS: 100 INJECTION, SOLUTION SUBCUTANEOUS at 09:00

## 2021-11-23 RX ADMIN — HYDROCODONE BITARTRATE AND ACETAMINOPHEN 1 TABLET: 5; 325 TABLET ORAL at 16:45

## 2021-11-23 RX ADMIN — SODIUM CHLORIDE, PRESERVATIVE FREE 10 ML: 5 INJECTION INTRAVENOUS at 21:15

## 2021-11-23 RX ADMIN — INSULIN GLARGINE 50 UNITS: 100 INJECTION, SOLUTION SUBCUTANEOUS at 21:18

## 2021-11-23 RX ADMIN — HYDROCODONE BITARTRATE AND ACETAMINOPHEN 1 TABLET: 5; 325 TABLET ORAL at 23:22

## 2021-11-23 RX ADMIN — DIVALPROEX SODIUM 125 MG: 125 CAPSULE ORAL at 08:58

## 2021-11-23 RX ADMIN — ASPIRIN 81 MG: 81 TABLET, CHEWABLE ORAL at 08:58

## 2021-11-23 RX ADMIN — METOPROLOL SUCCINATE 25 MG: 25 TABLET, EXTENDED RELEASE ORAL at 08:58

## 2021-11-23 RX ADMIN — GABAPENTIN 300 MG: 300 CAPSULE ORAL at 13:32

## 2021-11-23 RX ADMIN — LOPERAMIDE HYDROCHLORIDE 2 MG: 2 CAPSULE ORAL at 21:15

## 2021-11-23 RX ADMIN — SODIUM CHLORIDE, PRESERVATIVE FREE 10 ML: 5 INJECTION INTRAVENOUS at 08:58

## 2021-11-23 RX ADMIN — LOPERAMIDE HYDROCHLORIDE 2 MG: 2 CAPSULE ORAL at 23:22

## 2021-11-23 RX ADMIN — LOPERAMIDE HYDROCHLORIDE 2 MG: 2 CAPSULE ORAL at 15:14

## 2021-11-23 ASSESSMENT — PAIN DESCRIPTION - PAIN TYPE
TYPE: ACUTE PAIN

## 2021-11-23 ASSESSMENT — PAIN DESCRIPTION - LOCATION
LOCATION: BACK;HEAD;SHOULDER
LOCATION: BACK
LOCATION: BACK

## 2021-11-23 ASSESSMENT — PAIN SCALES - GENERAL
PAINLEVEL_OUTOF10: 8
PAINLEVEL_OUTOF10: 5
PAINLEVEL_OUTOF10: 7
PAINLEVEL_OUTOF10: 7

## 2021-11-23 NOTE — CARE COORDINATION
[facility]    Referral received from  to follow for home care services. I will follow for needs, and speak with patient to verify demos.       Tisha Norton RN, BSN CTN  [facility] [phone]

## 2021-11-23 NOTE — PROGRESS NOTES
Physical Therapy  Facility/Department: [facility] B3 - MED SURG  Daily Treatment Note  NAME: Solomon Bourgeois  : 1955  MRN: [id number]    Date of Service: 2021    Discharge Recommendations:  Subacute/Skilled Nursing Facility   PT Equipment Recommendations  Equipment Needed: No  If pt discharges prior to next PT session this note will serve as discharge summary. Assessment   Body structures, Functions, Activity limitations: Decreased functional mobility ; Decreased balance; Decreased endurance; Decreased coordination  Assessment: Pt participated in LE strengthening, transfer training, gait training with  ft CG/min assist. She remains below baseline function and will benefit from skilled PT to address deficits. Recommend SNF at discharge due to hx of falls, 16 steps to enter apt and reported visual deficits. Treatment Diagnosis: impaired functional mobility  Specific instructions for Next Treatment: progress mobility as tolerated  Prognosis: Good  Decision Making: Low Complexity  PT Education: Goals; General Safety; Gait Training; PT Role; Disease Specific Education; Plan of Care; Functional Mobility Training; Transfer Training  Patient Education: DIsease Specific: Pt educated in role of therapy, importance of mobility while hospitalized, up with staff assist only, use of call light, general safety. She voices understanding  Barriers to Learning: none  REQUIRES PT FOLLOW UP: Yes  Activity Tolerance  Activity Tolerance: Patient Tolerated treatment well; Patient limited by fatigue; Patient limited by endurance  Activity Tolerance: /80  HR 81   SpO2 95%     Patient Diagnosis(es): The primary encounter diagnosis was Stroke-like symptoms. Diagnoses of Hyperglycemia and Essential hypertension were also pertinent to this visit.      has a past medical history of Bipolar affective disorder ([facility] [address] Coronary artery disease involving native coronary artery of native heart without angina pectoris, COVID-19, Depression, Diabetes ([facility] [address] Hyperlipidemia, Hypertension, Insomnia, and Neuropathy. has a past surgical history that includes Cholecystectomy. Restrictions  Restrictions/Precautions  Restrictions/Precautions: Fall Risk, Up as Tolerated  Subjective   General  Chart Reviewed: Yes  Additional Pertinent Hx: MRI negative for CVA  Response To Previous Treatment: Patient with no complaints from previous session.   Family / Caregiver Present: No  Referring Practitioner: Dr. Sai Rosenthal MD  Subjective  Subjective: Pt agrees to therapy, denies pain, Reports she had pain medicine earlier  General Comment  Comments: RN cleared pt for therapy, pt resting in bed on approach  Pain Screening  Patient Currently in Pain: Denies  Vital Signs- see activity tolerance section  Patient Currently in Pain: Denies       Orientation  Overall Orientation Status: Within Functional Limits     Objective   Bed mobility  Supine to Sit: Stand by assistance     Transfers  Sit to Stand: Contact guard assistance  Stand to sit: Contact guard assistance  Bed to Chair: Contact guard assistance     Ambulation  Surface: level tile  Device: Rolling Walker  Assistance: Contact guard assistance; Minimal assistance  Quality of Gait: cues to keep body within walker frame, occassional min assist to navigate RW, recipricol pattern with ssymmetrical but shortened stride, no loss of balance  Distance: 140 ft        Exercises  Bridgin sets of 5 reps, minimal hip clearance from bed  Straight Leg Raise: 10 x B  Gluteal Sets: 10 x B  Hip Flexion: Hooklying march 10 x B  Sit to and from stand 5 x in succession from EOB  Hooklying isometric abd 10 x B       Hooklying fall outs 10 x B  Ankle Pumps: 15 x B       AM-PAC Score  AM-PAC Inpatient Mobility Raw Score : 18 (21 1050)  AM-PAC Inpatient T-Scale Score : 43.63 (21)  Mobility Inpatient CMS 0-100% Score: 46.58 (21 1050)  Mobility Inpatient CMS G-Code Modifier : CK (21) Goals  Short term goals  Time Frame for Short term goals: 1 week (11/29) unless otherwise specified  Short term goal 1: Pt will be mod I with bed mobility.: 11/23 CG  Short term goal 2: Pt will be SBA for transfers with LRAD.: 11/23 CG  Short term goal 3: Pt will ambulate 100 ft with SBA and LRAD.: 11/23 CG/ min assist with  ft  Short term goal 4: 11/26: Pt will participate in 12-15 reps of BLE exercises to promote strength and activity tolerance.: 11/23 10-15 reps LE Ex  Patient Goals   Patient goals : \"to get better\"    Plan    Times per week: 3-5x/wk  Times per day: Daily  Specific instructions for Next Treatment: progress mobility as tolerated  Current Treatment Recommendations: Strengthening, Neuromuscular Re-education, Home Exercise Program, Safety Education & Training, Balance Training, Endurance Training, Functional Mobility Training, Transfer Training, Gait Training, Equipment Evaluation, Education, & procurement, Patient/Caregiver Education & Training, Stair training  Safety Devices  Type of devices:  All fall risk precautions in place, Call light within reach, Chair alarm in place, Left in chair, Patient at risk for falls, Gait belt, Nurse notified (Handoff to Heather Vu)     Therapy Time   Individual Concurrent Group Co-treatment   Time In [facility]

## 2021-11-23 NOTE — PROGRESS NOTES
Michaela Gypsum  Neurology Follow-up  [facility] Neurology    Date of Service: 11/23/2021    Subjective:   CC: Follow up today regarding: Acute vision changes, right arm weakness, and leg left weakness    Events noted. Chart and lab reviewed. No new complaints. Says she feels the same. Discussed negative MRI with patient. ROS : A 10-12 system review obtained and updated today and is unremarkable except as mentioned  in my interval history. family history includes Heart Disease in her father and mother.     Past Medical History:   Diagnosis Date    Bipolar affective disorder ([facility] [address] 9/28/2015    Coronary artery disease involving native coronary artery of native heart without angina pectoris     COVID-19 11/17/2020    [facility]    Depression     Diabetes ([facility] [address]     Hyperlipidemia     Hypertension     Insomnia     Neuropathy      Current Facility-Administered Medications   Medication Dose Route Frequency Provider Last Rate Last Admin    insulin glargine (LANTUS) injection vial 50 Units  50 Units SubCUTAneous BID Gaurav Prasad, APRN - NP   50 Units at 11/23/21 0900    pantoprazole (PROTONIX) tablet 40 mg  40 mg Oral QAM AC Gaurav Prasad, APRN - NP   40 mg at 11/23/21 0548    sodium chloride flush 0.9 % injection 10 mL  10 mL IntraVENous 2 times per day Ionarnata Heads, APRN - NP   10 mL at 11/23/21 0858    sodium chloride flush 0.9 % injection 10 mL  10 mL IntraVENous PRN Leathahyrnata Heads, APRN - NP        0.9 % sodium chloride infusion  25 mL IntraVENous PRN Leathahyrn Heads, APRN - NP        magnesium hydroxide (MILK OF MAGNESIA) 400 MG/5ML suspension 30 mL  30 mL Oral Daily PRN Leathahyrn Heads, APRN - NP        enoxaparin (LOVENOX) injection 40 mg  40 mg SubCUTAneous Daily Kathyrnata Heads, APRN - NP   40 mg at 11/23/21 0858    atorvastatin (LIPITOR) tablet 80 mg  80 mg Oral Nightly Kathyrnata Heads, APRN - NP   80 mg at 11/22/21 2204    labetalol (NORMODYNE;TRANDATE) injection 10 mg  10 mg IntraVENous Q10 Min PRN MOHAN Rodriguez - NP        perflutren lipid microspheres (DEFINITY) injection 1.65 mg  1.5 mL IntraVENous ONCE PRN MOHAN Muñoz - CNP        prochlorperazine (COMPAZINE) injection 10 mg  10 mg IntraVENous Q6H PRN Ganesh Sheth MD        gabapentin (NEURONTIN) capsule 300 mg  300 mg Oral TID Ganesh Sheth MD   300 mg at 11/23/21 0858    HYDROcodone-acetaminophen (NORCO) 5-325 MG per tablet 1 tablet  1 tablet Oral Q6H PRN Ganesh Sheth MD   1 tablet at 11/23/21 0900    metoprolol succinate (TOPROL XL) extended release tablet 25 mg  25 mg Oral Daily Ganesh Sheth MD   25 mg at 11/23/21 0858    lisinopril (PRINIVIL;ZESTRIL) tablet 2.5 mg  2.5 mg Oral Daily Ganesh Sheth MD   2.5 mg at 11/23/21 0858    divalproex (DEPAKOTE) DR tablet 250 mg  250 mg Oral Nightly Ganesh Sheth MD   250 mg at 11/22/21 2129    divalproex (DEPAKOTE SPRINKLE) capsule 125 mg  125 mg Oral Daily Ganesh Sheth MD   125 mg at 11/23/21 0858    aspirin chewable tablet 81 mg  81 mg Oral Daily Ganesh Sheth MD   81 mg at 11/23/21 0858    FLUoxetine (PROZAC) capsule 40 mg  40 mg Oral Daily Ganesh Sheth MD   40 mg at 11/23/21 0857     Allergies   Allergen Reactions    Iv Contrast [Iodides]     Pcn [Penicillins] Shortness Of Breath      reports that she has never smoked. She has never used smokeless tobacco. She reports that she does not drink alcohol and does not use drugs. Objective:  Exam:   Constitutional:   Vitals:    11/23/21 0030 11/23/21 0546 11/23/21 0845 11/23/21 1108   BP: (!) 162/66 (!) 169/90 (!) 148/80 135/65   Pulse: 79 82 81 74   Resp: 18 16 16 16   Temp: 98.4 °F (36.9 °C) 98.4 °F (36.9 °C) 97.6 °F (36.4 °C)    TempSrc: Oral Oral Oral    SpO2: 96% 94% 95% 93%   Weight:       Height:         General appearance:  Normal development and appear in no acute distress. Mental Status:   Oriented to person, place, problem, and time. Memory: Good immediate recall.   Intact remote memory  Normal attention span and concentration. Language: intact naming, repeating and fluency   Good fund of Knowledge. Cranial Nerves:   II: Visual fields: Full. Pupils: equal, round, reactive to light  III,IV,VI: Extra Ocular Movements are intact. No nystagmus  V: Facial sensation is intact  VII: Facial strength and movements: intact and symmetric  IX: Palate elevation is symmetric  XI: Shoulder shrug is intact  XII: Tongue movements are normal  Musculoskeletal: 4/5 in all 4 extremities. Poor effort. Tone: Normal tone. Reflexes: Symmetric 2+ in both arms and legs. Coordination: no pronator drift, no dysmetria with FNF  Sensation: normal to all modalities in both arms and legs. Gait/Posture: steady gait        Data:  LABS:   Lab Results   Component Value Date     11/21/2021    K 4.4 11/21/2021    K 4.1 11/19/2020    CL 98 11/21/2021    CO2 25 11/21/2021    BUN 20 11/21/2021    CREATININE 1.0 11/21/2021    GFRAA >60 11/21/2021    LABGLOM 56 11/21/2021    GLUCOSE 248 11/21/2021    PHOS 4.0 11/26/2020    MG 2.10 11/19/2020    CALCIUM 9.1 11/21/2021     Lab Results   Component Value Date    WBC 8.3 11/22/2021    RBC 3.87 11/22/2021    HGB 11.4 11/22/2021    HCT 33.5 11/22/2021    MCV 86.6 11/22/2021    RDW 14.6 11/22/2021     11/22/2021     Lab Results   Component Value Date    INR 0.99 11/21/2021    PROTIME 11.2 11/21/2021       Neuroimaging was independently reviewed by me and discussed results with the patient  I reviewed blood testing and other test results and discussed results with the patient      Impression:    Multiple neurological complaints including bilateral vision changes, right arm weakness, left leg weakness, and unsteady gait. Her exam is inconsistent. CTH and CTA head and neck are unremarkable. MRI of brain is negative. ECHO reviewed. Bipolar disorder  HTN  HLD  DM2, uncontrolled. A1c 10    Recommendation    Monitor on tele. Continue ASA, statin.   Continue home BP meds. F/u lipid panel. ESR elevated, but other inflammatory markers negative. Non-specific. F/u methylmalonic acid and ceruloplasmin. Improved glycemic control. SSI coverage while inpatient. PT/OT/SLP     May be discharged from a neurological perspective when medically stable. Recommend outpatient ophthalmology exam. Discussed with patient. Burke Marquez CNP      This dictation was generated by voice recognition computer software. Although all attempts are made to edit the dictation for accuracy, there may be errors in the transcription that are not intended.

## 2021-11-23 NOTE — DISCHARGE INSTR - COC
Continuity of Care Form    Patient Name: Lona Camp   :  1955  MRN:  [id number]    Admit date:  2021  Discharge date:  21     Code Status Order: Full Code   Advance Directives:      Admitting Physician:  Jesus Cyr MD  PCP: Beau Pineda DO, DO    Discharging Nurse: [facility] Unit/Room#: 0155/8182-96  Discharging Unit Phone Number: ***    Emergency Contact:   Extended Emergency Contact Information  Primary Emergency Contact: Miroslava [address] Phone: [phone]  Relation: Child    Past Surgical History:  Past Surgical History:   Procedure Laterality Date    CHOLECYSTECTOMY         Immunization History: There is no immunization history on file for this patient. Active Problems:  Patient Active Problem List   Diagnosis Code    Diabetes ([facility] [address] E11.9    Hyperlipidemia E78.5    Depression F32. A    Insomnia G47.00    Neuropathy G62.9    Major depressive disorder, recurrent, in remission ([facility]) F33.40    Uncontrolled diabetes mellitus ([facility] [address] E11.65    Bipolar affective disorder ([facility] [address] F31.9    HTN (hypertension), benign I10    DM (diabetes mellitus), secondary, uncontrolled, w/neurologic complic ([facility]) R51.64, T55.04    Trigeminal neuralgia of left side of face G50.0    DM (diabetes mellitus) type 2, uncontrolled, with ketoacidosis ([facility] [address] E11.10    Abnormal EKG R94.31    Coronary artery disease involving native coronary artery of native heart without angina pectoris I25.10    Diabetic ketoacidosis without coma associated with type 2 diabetes mellitus (HCC) E11.10    Abnormal CXR R93.89    DM (diabetes mellitus), type 2, uncontrolled with complications ([facility]) D39.0, E11.65    COVID-19 U07.1    Stroke-like symptoms R29.90    Acute CVA (cerebrovascular accident) ([facility] [address] I63.9    Double vision H53.2       Isolation/Infection:   Isolation            No Isolation          Patient Infection Status       Infection Onset Added Last Indicated Last Indicated By Review Planned Expiration Resolved Resolved By    None active    Resolved    COVID-19  20 Hernando Bal RN   20     +             Nurse Assessment:  Last Vital Signs: /65   Pulse 74   Temp 97.6 °F (36.4 °C) (Oral)   Resp 16   Ht 5' 2\" (1.575 m)   Wt 159 lb 9.6 oz (72.4 kg)   SpO2 93%   BMI 29.19 kg/m²     Last documented pain score (0-10 scale): Pain Level: 7  Last Weight:   Wt Readings from Last 1 Encounters:   21 159 lb 9.6 oz (72.4 kg)     Mental Status:  {IP PT MENTAL STATUS:}    IV Access:  { HILDA IV ACCESS:395777756}    Nursing Mobility/ADLs:  Walking   {CHP DME DYSV:684315241}  Transfer  {CHP DME BNDK:501467051}  Bathing  {CHP DME GEMC:378379032}  Dressing  {CHP DME UJXI:813485220}  Toileting  {CHP DME YTXE:636682674}  Feeding  {P DME XOH}  Med Admin  {P DME YAWK:852026330}  Med Delivery   { HILDA MED Delivery:576902001}    Wound Care Documentation and Therapy:        Elimination:  Continence: Bowel: {YES / TF:55729}  Bladder: {YES / LX:38032}  Urinary Catheter: {Urinary Catheter:532850621}   Colostomy/Ileostomy/Ileal Conduit: {YES / WH:37163}       Date of Last BM: ***    Intake/Output Summary (Last 24 hours) at 2021 1325  Last data filed at 2021 0659  Gross per 24 hour   Intake 720 ml   Output 400 ml   Net 320 ml     I/O last 3 completed shifts:   In: 12 [P.O.:960]  Out: 400 [Urine:400]    Safety Concerns:     [address] [employer] Safety Concerns:008890754}    Impairments/Disabilities:      [address] [employer] Impairments/Disabilities:511292403}    Nutrition Therapy:  Current Nutrition Therapy:   [address] [employer] Diet List:902602063}    Routes of Feeding: {CHP DME Other Feedings:068629284}  Liquids: {Slp liquid thickness:69728}  Daily Fluid Restriction: {CHP DME Yes amt example:812176179}  Last Modified Barium Swallow with Video (Video Swallowing Test): {Done [facility] Done [facility]519391480}    Treatments at the Time of Hospital Discharge:   Respiratory Treatments: ***  Oxygen Therapy:  {Therapy; copd oxygen:68528}  Ventilator:    [address] Vent YWXR:463274069}    Rehab Therapies: Physical Therapy, Occupational Therapy, and Nurse  Weight Bearing Status/Restrictions: [address] Weight Bearin}  Other Medical Equipment (for information only, NOT a DME order):  {EQUIPMENT:305176413}  Other Treatments: [facility]: LEVEL 3 [address] to establish plan of care for patient over 60 day period   Nursing  Initial home SN evaluation visit to occur within 24-48 hours for:  1)  medication management  2)  VS and clinical assessment  3)  S&S chronic disease exacerbation education + when to contact MD/NP  4)  care coordination  Medication Reconciliation during 1st SN visit  PT/OT/Speech   Evaluations in home within 24-48 hours of discharge to include DME and home safety   Frontload therapy 5 days, then 3x a week   OT to evaluate if patient has [address] needs for personal care    evaluation within 24-48 hours to evaluate resources & insurance for potential AL, [place], LTC, and Medicaid options   Palliative Care referral within 5 days of hospital discharge   PCP Visit scheduled within 3 - 7 days of hospital discharge    [address] (If patient is agreeable and meets guidelines)      Patient's personal belongings (please select all that are sent with patient):  {CHP DME Belongings:750686389}    RN SIGNATURE:  {Esignature:498189139}    CASE MANAGEMENT/SOCIAL WORK SECTION    Inpatient Status Date: 21    Readmission Risk Assessment Score:  Readmission Risk              Risk of Unplanned Readmission:  13           Discharging to Facility/ Agency   Name:  [facility] care    Address: [address] [place] [address]., [address] [place], [place] [address] [address]  Phone: [phone]  Fax: [phone]      / signature: Electronically signed by Rosa Maria Trinidad RN on 21 at 1:25 PM EST    PHYSICIAN SECTION    Prognosis: Good    Condition at Discharge: Stable    Rehab Potential (if transferring to Rehab): Good    Recommended Labs or Other Treatments After Discharge:   Home Care  PT/OT  Follow-up with PCP  Follow-up with Ophthalmology     Physician Certification: I certify the above information and transfer of [place] Brenda  is necessary for the continuing treatment of the diagnosis listed and that she requires Home Care for greater 30 days.      Update Admission H&P: No change in H&P    PHYSICIAN SIGNATURE:  Electronically signed by Pasquale Mccall MD on 11/24/21 at 2:39 PM EST

## 2021-11-23 NOTE — PLAN OF CARE
Problem: Falls - Risk of:  Goal: Will remain free from falls  Description: Will remain free from falls  11/23/2021 0911 by Tawanna Landrum RN  Outcome: Ongoing     Problem: Falls - Risk of:  Goal: Absence of physical injury  Description: Absence of physical injury  11/23/2021 0911 by Tawanna Landrum RN  Outcome: Ongoing

## 2021-11-23 NOTE — PLAN OF CARE
Problem: Falls - Risk of:  Goal: Will remain free from falls  Description: Will remain free from falls  Outcome: Ongoing  Goal: Absence of physical injury  Description: Absence of physical injury  Outcome: Ongoing     Problem: Pain:  Goal: Pain level will decrease  Description: Pain level will decrease  Outcome: Ongoing  Goal: Control of acute pain  Description: Control of acute pain  Outcome: Ongoing  Goal: Control of chronic pain  Description: Control of chronic pain  Outcome: Ongoing     Problem: HEMODYNAMIC STATUS  Goal: Patient has stable vital signs and fluid balance  Outcome: Ongoing     Problem: Nutrition  Goal: Optimal nutrition therapy  Outcome: Ongoing

## 2021-11-23 NOTE — PROGRESS NOTES
Hospitalist Progress Note    Date of Admission: 11/21/2021    Chief Complaint: Stroke-like symptoms    Hospital Course:   61 y.o. female, with PMH of HTN, HLD, DM 2, CAD, bipolar disorder, who presented to [facility] with strokelike symptoms. History was obtained from the patient and review of the EMR. The patient states that about 3 days ago, she felt like her left eyelid was very heavy and was having difficulty opening it. She states that this has continued and her vision is getting blurry because of it. Today, her right eyes started having the same effects though states that it is not as bad as the left eye. She also notes RIGHT arm weakness and LEFT leg weakness. She denies any history of stroke or recent injury. She does take full dose aspirin on a daily basis for history of CAD. The patient states that she is also having difficulty walking and states that her gait is \"very off\". She decided to come into the ED for further evaluation. Stroke team was initiated initially but decided on no TPA intervention as the patient symptoms have persisted for a few days now. She will be admitted for further evaluation. Subjective: Her weakness and mobility issues seem to be improving. She continues to complain of visual changes left > right. She developed several episodes of large-volume diarrhea this afternoon.  Complains of nausea    Labs:   Recent Labs     11/21/21 2130 11/22/21  0402   WBC 7.7 8.3   HGB 11.3* 11.4*   HCT 33.0* 33.5*    248     Recent Labs     11/21/21 2130   *   K 4.4   CL 98*   CO2 25   BUN 20   CREATININE 1.0   CALCIUM 9.1     Recent Labs     11/21/21 2130   AST 25   ALT 18   BILITOT 0.4   ALKPHOS 91     Recent Labs     11/21/21  2157   INR 0.99       Physical Exam Performed:    /65   Pulse 74   Temp 97.6 °F (36.4 °C) (Oral)   Resp 16   Ht 5' 2\" (1.575 m)   Wt 159 lb 9.6 oz (72.4 kg)   SpO2 93%   BMI 29.19 kg/m²     General appearance:  No apparent distress, appears stated age and cooperative. HEENT:  Pupils equal, round, and reactive to light. Conjunctivae/corneas clear. Neck:  Supple, no jugular venous distention. Trachea midline with full range of motion. Respiratory:  Normal respiratory effort. Clear to auscultation, bilaterally without Rales/Wheezes/Rhonchi. Cardiovascular:  Regular rhythm, tachycardic with normal S1/S2 without murmurs, rubs or gallops. Abdomen:  Soft, non-tender, non-distended with normal bowel sounds. Musculoskelatal:  No clubbing, cyanosis or edema bilaterally. Full range of motion without deformity. Neurologic:  Neurovascularly intact without any focal sensory/motor deficits. Cranial nerves: II-XII intact, grossly non-focal.  Psychiatric:  Alert and oriented, thought content appropriate, normal insight  Skin:  Skin color, texture, turgor normal.  No rashes or lesions. Capillary Refill:  Brisk,< 3 seconds   Peripheral Pulses:  +2 palpable, equal bilaterally       Assessment/Plan:    Active Hospital Problems    Diagnosis     Acute CVA (cerebrovascular accident) ([address] [I63.9]     Double vision [H53.2]     Stroke-like symptoms [R29.90]     DM (diabetes mellitus), type 2, uncontrolled with complications ([address] [A90.2, E11.65]      Possible TIA  atypical presentation, symptoms including bilateral visual changes, right arm weakness, left leg weakness. Exam somewhat inconsistent.  - CTH in ED non-acute. - CTA head/neck: Unremarkable  - Brain MRI negative  - ESR elevated at 81 of unclear significance; CRP normal. Visual changes are noted, but given constellation of neurologic complaints without other features, there is currently a low clinical suspicion for GCA. 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 - Discussed with Neurology  - Would benefit from close Ophthalmology follow-up   - PT[facility]OT [facility]    Essential HTN, controlled.   - Hold home meds for permissive htn  - Telemetry monitoring has been stable    Hx of HLD, controlled with statin. - Continue home Lipitor  - Follow-up with PCP for med adjustments    DM2, uncontrolled. -  on admission  - Hemoglobin a1c 13 on 11/22/2020, repeat pending  - LDSSI + prandial  - POCT ac/hs  - Hypoglycemia protocol  - Carb control diet    Hx of bipolar disorder, mood currently stable  -resume medications as tolerated    Diarrhea: Etiology unclear. Will monitor. Can give Imodium as needed. Diabetics. DVT Prophylaxis: lovenox  Diet: ADULT DIET; Regular; 3 carb choices (45 gm/meal); Low Fat/Low Chol/High Fiber/2 gm Na  Code Status: Full Code  PT/OT Eval Status: Completed    Dispo -initially plan for discharge today although she has developed some new symptoms of severe diarrhea. Will treat symptomatically and observe overnight. Anticipate discharge home with home health care services tomorrow.     Karen Anaya MD

## 2021-11-23 NOTE — CARE COORDINATION
CM met with patient at bedside to discuss therapy recs for skilled nursing facility at discharge. Patient declines SNF at this time. States prefers to return home. Agreeable to home care. No preference for agency. Referral called to Terrance Jensen with [facility] care, who accepted patient.

## 2021-11-24 VITALS
BODY MASS INDEX: 29.37 KG/M2 | HEIGHT: 62 IN | DIASTOLIC BLOOD PRESSURE: 55 MMHG | WEIGHT: 159.6 LBS | RESPIRATION RATE: 16 BRPM | OXYGEN SATURATION: 97 % | TEMPERATURE: 98.1 F | HEART RATE: 74 BPM | SYSTOLIC BLOOD PRESSURE: 116 MMHG

## 2021-11-24 LAB
CERULOPLASMIN: 31 MG/DL (ref 16–45)
GLUCOSE BLD-MCNC: 183 MG/DL (ref 70–99)
GLUCOSE BLD-MCNC: 93 MG/DL (ref 70–99)
ORGANISM: ABNORMAL
PERFORMED ON: ABNORMAL
PERFORMED ON: NORMAL
URINE CULTURE, ROUTINE: ABNORMAL

## 2021-11-24 PROCEDURE — 2580000003 HC RX 258: Performed by: NURSE PRACTITIONER

## 2021-11-24 PROCEDURE — 6370000000 HC RX 637 (ALT 250 FOR IP): Performed by: NURSE PRACTITIONER

## 2021-11-24 PROCEDURE — 6370000000 HC RX 637 (ALT 250 FOR IP): Performed by: INTERNAL MEDICINE

## 2021-11-24 PROCEDURE — 6360000002 HC RX W HCPCS: Performed by: NURSE PRACTITIONER

## 2021-11-24 PROCEDURE — G0378 HOSPITAL OBSERVATION PER HR: HCPCS

## 2021-11-24 PROCEDURE — 97530 THERAPEUTIC ACTIVITIES: CPT

## 2021-11-24 PROCEDURE — 96372 THER/PROPH/DIAG INJ SC/IM: CPT

## 2021-11-24 PROCEDURE — 97116 GAIT TRAINING THERAPY: CPT

## 2021-11-24 RX ORDER — CEFUROXIME AXETIL 500 MG/1
500 TABLET ORAL 2 TIMES DAILY
Qty: 10 TABLET | Refills: 0 | Status: SHIPPED | OUTPATIENT
Start: 2021-11-24 | End: 2021-11-29

## 2021-11-24 RX ORDER — CEFUROXIME AXETIL 250 MG/1
500 TABLET ORAL EVERY 12 HOURS SCHEDULED
Status: DISCONTINUED | OUTPATIENT
Start: 2021-11-24 | End: 2021-11-24 | Stop reason: HOSPADM

## 2021-11-24 RX ORDER — CIPROFLOXACIN 500 MG/1
500 TABLET, FILM COATED ORAL EVERY 12 HOURS SCHEDULED
Status: DISCONTINUED | OUTPATIENT
Start: 2021-11-24 | End: 2021-11-24

## 2021-11-24 RX ORDER — LORAZEPAM 0.5 MG/1
0.5 TABLET ORAL EVERY 6 HOURS PRN
Qty: 12 TABLET | Refills: 0 | Status: SHIPPED | OUTPATIENT
Start: 2021-11-24 | End: 2021-12-01

## 2021-11-24 RX ORDER — METOPROLOL SUCCINATE 25 MG/1
25 TABLET, EXTENDED RELEASE ORAL DAILY
Qty: 30 TABLET | Refills: 0 | COMMUNITY
Start: 2021-11-25

## 2021-11-24 RX ADMIN — GABAPENTIN 300 MG: 300 CAPSULE ORAL at 08:59

## 2021-11-24 RX ADMIN — FLUOXETINE 40 MG: 20 CAPSULE ORAL at 08:59

## 2021-11-24 RX ADMIN — LISINOPRIL 2.5 MG: 2.5 TABLET ORAL at 08:59

## 2021-11-24 RX ADMIN — METOPROLOL SUCCINATE 25 MG: 25 TABLET, EXTENDED RELEASE ORAL at 08:59

## 2021-11-24 RX ADMIN — ASPIRIN 81 MG: 81 TABLET, CHEWABLE ORAL at 08:59

## 2021-11-24 RX ADMIN — SODIUM CHLORIDE, PRESERVATIVE FREE 10 ML: 5 INJECTION INTRAVENOUS at 09:00

## 2021-11-24 RX ADMIN — DIVALPROEX SODIUM 125 MG: 125 CAPSULE ORAL at 09:00

## 2021-11-24 RX ADMIN — CEFUROXIME AXETIL 500 MG: 250 TABLET ORAL at 12:17

## 2021-11-24 RX ADMIN — INSULIN GLARGINE 50 UNITS: 100 INJECTION, SOLUTION SUBCUTANEOUS at 09:06

## 2021-11-24 RX ADMIN — ENOXAPARIN SODIUM 40 MG: 40 INJECTION SUBCUTANEOUS at 08:59

## 2021-11-24 RX ADMIN — PANTOPRAZOLE SODIUM 40 MG: 40 TABLET, DELAYED RELEASE ORAL at 04:42

## 2021-11-24 ASSESSMENT — PAIN SCALES - GENERAL
PAINLEVEL_OUTOF10: 0

## 2021-11-24 NOTE — CARE COORDINATION
[facility]    DC order noted, all docs needed have been faxed to [facility] for home care services.     Home care to see patient by Antonino Soto RN, BSN CTN  [facility] [phone]

## 2021-11-24 NOTE — HOME CARE
Rosalee Varela will require the following home care treatments or therapies: Skilled Nursing, vital signs, medication compliance and education, PT/OT, wound care, teaching and management of medical conditions, etc.  Home care will be necessary because of deconditioning. The patient is in agreement to receiving home care.

## 2021-11-24 NOTE — DISCHARGE SUMMARY
Unremarkable  - Brain MRI negative  - ESR elevated at 81 of unclear significance; CRP normal. Visual changes are noted, but given constellation of neurologic complaints without other features, there is currently a low clinical suspicion for GCA. 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 - Discussed with Neurology  - Would benefit from close Ophthalmology follow-up   - PT/OT completed, prefers home with Syed Barajas    Essential HTN, controlled. - Hold home meds for permissive htn  - Telemetry monitoring has been stable    Hx of HLD, controlled with statin. - Continue home Lipitor  - Follow-up with PCP for med adjustments    DM2, uncontrolled. - Resume home regimen    Hx of bipolar disorder, mood currently stable  -resume medications as tolerated    Diarrhea: Etiology unclear. Will monitor. Can give Imodium as needed. UTI: UCx from 11/22 positive for E.coli. Start Ceftin. Exam:   BP (!) 116/55   Pulse 74   Temp 98.1 °F (36.7 °C) (Oral)   Resp 16   Ht 5' 2\" (1.575 m)   Wt 159 lb 9.6 oz (72.4 kg)   SpO2 97%   BMI 29.19 kg/m²   General appearance:  No apparent distress, appears stated age and cooperative. HEENT:  Pupils equal, round, and reactive to light. Conjunctivae/corneas clear. Neck:  Supple, no jugular venous distention. Trachea midline with full range of motion. Respiratory:  Normal respiratory effort. Clear to auscultation, bilaterally without Rales/Wheezes/Rhonchi. Cardiovascular:  Regular rhythm, tachycardic with normal S1/S2 without murmurs, rubs or gallops. Abdomen:  Soft, non-tender, non-distended with normal bowel sounds. Musculoskelatal:  No clubbing, cyanosis or edema bilaterally. Full range of motion without deformity. Neurologic:  Neurovascularly intact without any focal sensory/motor deficits.  Cranial nerves: II-XII intact, grossly non-focal.  Psychiatric:  Alert and oriented, thought content appropriate, normal insight  Skin:  Skin color, texture, turgor normal.  No rashes or lesions. Capillary Refill:  Brisk,< 3 seconds   Peripheral Pulses:  +2 palpable, equal bilaterally       Patient Discharge Instructions: Follow up:  1. Primary Care Provider Renard Salinas DO, DO in the next 1-2 weeks. 2.  Ophthalmology    Discharge Medications:   Discharge Medication List as of 11/24/2021  1:56 PM      START taking these medications    Details   cefUROXime (CEFTIN) 500 MG tablet Take 1 tablet by mouth 2 times daily for 5 days, Disp-10 tablet, R-0Normal      metoprolol succinate (TOPROL XL) 25 MG extended release tablet Take 1 tablet by mouth daily, Disp-30 tablet, R-0Historical Med      LORazepam (ATIVAN) 0.5 MG tablet Take 1 tablet by mouth every 6 hours as needed for Anxiety for up to 7 days. , Disp-12 tablet, R-0Normal           Discharge Medication List as of 11/24/2021  1:56 PM        Discharge Medication List as of 11/24/2021  1:56 PM      CONTINUE these medications which have NOT CHANGED    Details   HYDROcodone-acetaminophen (NORCO) 5-325 MG per tablet Take 1 tablet by mouth every 6 hours as needed for Pain. Historical Med      gabapentin (NEURONTIN) 300 MG capsule Take 900 mg by mouth 3 times daily. Historical Med      insulin glargine (LANTUS) 100 UNIT/ML injection vial Inject 50 Units into the skin 2 times daily, Disp-1 vial,R-3Normal      insulin lispro (HUMALOG) 100 UNIT/ML injection vial Inject 0-9 Units into the skin nightly, Disp-1 vial,R-3Normal      lisinopril (PRINIVIL;ZESTRIL) 20 MG tablet Take 1 tablet by mouth daily, Disp-30 tablet,R-3Normal      amLODIPine (NORVASC) 10 MG tablet Take 1 tablet by mouth daily, Disp-30 tablet,R-3Normal      atorvastatin (LIPITOR) 40 MG tablet Take 40 mg by mouth dailyHistorical Med      divalproex (DEPAKOTE) 250 MG DR tablet Take 250 mg by mouth 2 times daily 250 mg at night and 150 mg in the morningHistorical Med      QUEtiapine (SEROQUEL) 300 MG tablet TAKE ONE TABLET BY MOUTH ONCE NIGHTLY, Disp-90 tablet, R-2      omeprazole (PRILOSEC) 20 MG delayed release capsule Take 1 capsule by mouth Daily, Disp-90 capsule, R-2      FLUoxetine (PROZAC) 20 MG tablet Take 1 tablet by mouth daily, Disp-90 tablet, R-5      glucose blood VI test strips (EVA CONTOUR TEST) strip DAILY Starting 8/29/2016, Until Discontinued, Disp-100 each, R-0, NormalAs needed. Insulin Pen Needle 32G X 4 MM MISC Disp-150 each, R-3, Normal4 times a day      aspirin 325 MG tablet Take 1 tablet by mouth daily, Disp-30 tablet, R-5           Discharge Medication List as of 11/24/2021  1:56 PM      STOP taking these medications       metoprolol tartrate (LOPRESSOR) 25 MG tablet Comments:   Reason for Stopping:         busPIRone (BUSPAR) 10 MG tablet Comments:   Reason for Stopping:                 Significant Test Results    Echo Complete    Result Date: 11/22/2021  Transthoracic Echocardiography Report (TTE)  Demographics   Patient Name       Malka Fournier   Date of Study      11/22/2021         Gender              Female   Patient Number     [id number]         Date of Birth       1955   Visit Number       [id number]          Age                 61 year(s)   Accession Number   [id number]         Room Number         0275   Corporate ID       [id number]           [address] [place] [address] Physician Haylie Venegas[address]                Physician           Jorge Christian MD, [facility]  Procedure Type of Study   TTE procedure:ECHOCARDIOGRAM COMPLETE 2D W DOPPLER W COLOR. Procedure Date Date: 11/22/2021 Start: 10:03 AM Study Location: [address] [place] [address] - Echo Lab Technical Quality: Limited visualization due to poor acoustical window. Indications:TIA.  Patient Status: Routine Height: 62 inches Weight: 150.63 pounds BSA: 1.69 m2 BMI: 27.55 kg/m2 HR: 106 bpm BP: 184/97 mmHg  Conclusions   Summary  Globally normal left ventricular systolic function. Left ventricular systolic function is low normal by 3D calculated EF of 51%. The left ventricle is normal in size with normal wall thickness. Cannot exclude regional wall motion abnormalities secondary to marginal  endocardial visualization. However, no obvious abnormalities are observed. Technically difficult examination due to poor apical windows. Definity® was  not used due to lack of IV access. A bubble study was attempted but failed to be diagnostic due to IV line  failure   Signature   ------------------------------------------------------------------  Electronically signed by Chano Montoya MD, [facility]  (Interpreting physician) on 11/22/2021 at 11:43 AM  ------------------------------------------------------------------   Findings   Left Ventricle  Globally normal left ventricular systolic function. Left ventricular systolic function is low normal by 3D calculated EF of 51%. The left ventricle is normal in size with normal wall thickness. Cannot exclude regional wall motion abnormalities secondary to marginal  endocardial visualization. However, no obvious abnormalities are observed. Indeterminate diastolic function. Mitral Valve  The mitral valve is normal in structure and function. No evidence of mitral regurgitation. Left Atrium  The left atrium is normal in size. A bubble study was attempted but failed to  be diagnostic due to IV line failure. Aortic Valve  The aortic valve appears sclerotic but opens well. Mild aortic regurgitation. Aorta  The aortic root is normal in size. Right Ventricle  The right ventricle is normal in size. Right ventricular systolic function is normal.  TAPSE is measured at 17 mm. S velocity is measured at 12.7 cm/s. RV EF calculated by 3D at 45%. FAC is calculated at 43%. Tricuspid Valve  The tricuspid valve is normal in structure. Trace tricuspid regurgitation.   Inadequate tricuspid valve regurgitation to estimate systolic pulmonary  artery pressure. Right Atrium  The right atrium is normal in size. Right atrial area is 11.2 cm2. Right atrial volume is 13.1 ml/m2. Pulmonic Valve  The pulmonic valve leaflets are not well visualized. No evidence of pulmonic regurgitation. Pericardial Effusion  No pericardial effusion noted. Pleural Effusion  No pleural effusion noted. Miscellaneous  No obvious masses, thrombi, or vegetations are noted. IVC is normal in size (<2.1 cm) and collapses > 50% with respiration  consistent with normal right atrial pressure (3 mmHg). M-Mode/2D Measurements (cm)   LV Diastolic Dimension: 7.98 cm LV Systolic Dimension: 5.99 cm  LV Septum Diastolic: 9.76 cm  LV PW Diastolic: 4.84 cm        AO Root Dimension: 3.1 cm                                  AV Cusp Separation: 1.9 cm                                  LA Dimension: 3.8 cm  LVOT: 1.7 cm                    LA Area: 14.9 cm2                                  LA volume/Index: 39.1 ml /23 ml/m2  Doppler Measurements   AV Peak Velocity: 132 cm/s     MV Peak E-Wave: 90 cm/s  AV Peak Gradient: 6.97 mmHg    MV Peak A-Wave: 155 cm/s  LVOT Peak Velocity: 81.4 cm/s  MV E/A Ratio: 0.58   Estimated RAP:3 mmHg  E' Septal Velocity: 3.59 cm/s  E' Lateral Velocity: 4.57 cm/s  E/E' ratio: 22.4   Aortic Valve   Peak Velocity: 132 cm/s  Peak Gradient: 6.97 mmHg   Cusp Separation: 1.9 cm  Aorta   Aortic Root: 3.1 cm  Ascending Aorta: 3.1 cm  LVOT Diameter: 1.7 cm      CT Head WO Contrast    Addendum Date: 11/21/2021    ADDENDUM: Critical results were called by Dr. Sushila Sands DO to Dr. Jasmin El on 11/21/2021 at 21:37.      Result Date: 11/21/2021  EXAMINATION: CT OF THE HEAD WITHOUT CONTRAST  11/21/2021 9:20 pm TECHNIQUE: CT of the head was performed without the administration of intravenous contrast. Dose modulation, iterative reconstruction, and/or weight based adjustment of the mA/kV was utilized to reduce the radiation dose to as low as reasonably achievable. COMPARISON: CT head 7 6 2019. HISTORY: ORDERING SYSTEM PROVIDED HISTORY: CVA TECHNOLOGIST PROVIDED HISTORY: Reason for exam:->CVA Has a \"code stroke\" or \"stroke alert\" been called? ->Yes Decision Support Exception - unselect if not a suspected or confirmed emergency medical condition->Emergency Medical Condition (MA) Reason for Exam: right side weakness. blurred vision Acuity: Acute Type of Exam: Initial FINDINGS: BRAIN/VENTRICLES: No acute hemorrhage. Periventricular and subcortical hypoattenuation is nonspecific and may be related to microvascular disease. Chronic lacunar infarct in the right thalamus. [place] white differentiation appears maintained given artifact near the skull base and through the posterior fossa. Cerebral volume loss and minimal prominence of the ventricles noted. There is no midline shift. Basal cisterns appear patent. ORBITS: Visualized orbits appear unremarkable on this unenhanced exam. SINUSES: Visualized paranasal sinuses appear clear. Visualized mastoid air cells appear clear. SOFT TISSUES/SKULL: No depressed calvarial fracture. No acute hemorrhage or large intracranial mass effect. Aspects 10. CTA HEAD NECK W CONTRAST    Result Date: 11/21/2021  EXAMINATION: CTA OF THE HEAD AND NECK WITH CONTRAST 11/21/2021 9:23 pm: TECHNIQUE: CTA of the head and neck was performed with the administration of intravenous contrast. Multiplanar reformatted images are provided for review. MIP images are provided for review. Stenosis of the internal carotid arteries measured using NASCET criteria. Dose modulation, iterative reconstruction, and/or weight based adjustment of the mA/kV was utilized to reduce the radiation dose to as low as reasonably achievable. This scan was analyzed using Viz. ai contact LVO. Identification of suspected findings is not for diagnostic use beyond notification.  Viz LVO is limited to analysis of imaging data and should not be used in-lieu of full patient evaluation or relied upon to make or confirm diagnosis. COMPARISON: CT head same day. HISTORY: ORDERING SYSTEM PROVIDED HISTORY: CVA TECHNOLOGIST PROVIDED HISTORY: Reason for exam:->CVA Decision Support Exception - unselect if not a suspected or confirmed emergency medical condition->Emergency Medical Condition (MA) Reason for Exam: blurred vision-h/a-right sided weakness Acuity: Acute Type of Exam: Initial CTA NECK: AORTIC ARCH/ARCH VESSELS:  Origins of the innominate, brachiocephalic, and subclavian arteries appear patent. CAROTID ARTERIES: Right common carotid artery: Motion degraded evaluation proximally. Patent without focal stenosis. Right internal carotid artery: No significant stenosis by NASCET criteria. Retropharyngeal course. Right external carotid artery origin: Mild stenosis. Left common carotid artery: Patent without focal stenosis. Left internal carotid artery: No significant stenosis by NASCET criteria. Left external carotid artery origin: Mild stenosis. VERTEBRAL ARTERIES: Right vertebral artery: Patent without focal stenosis. Left vertebral artery: Suboptimally visualized proximally due to artifact from dense contrast in adjacent veins. Patent without focal stenosis. SOFT TISSUES: Visualized lung apices are clear. No adenopathy in the neck and visualized chest. Thyroglossal duct cyst measuring up to 2 cm inferior to the floor of the mouth. BONES: Scattered degenerative changes noted in the visualized spine without spondylolisthesis. CTA HEAD: ANTERIOR CIRCULATION: Intracranial internal carotid arteries: Patent without focal stenosis. Anterior cerebral arteries: No focal stenosis at the level of the [place]. Hypoplastic right A1. Middle cerebral arteries: No focal stenosis at the level of the [place]. POSTERIOR CIRCULATION: Intracranial vertebral arteries: Patent without focal stenosis. Basilar artery: Patent without focal stenosis.  Posterior cerebral arteries: No focal stenosis at the level of the [place]. Right posterior communicating artery provides dominant flow to the right posterior cerebral artery. OTHER: Cavernous sinuses are not well evaluated on this phase of contrast. Otherwise, no dural venous sinus thrombosis on this non-dedicated study. BRAIN: No large mass effect or midline shift. No extra-axial fluid collection. 1. No large vessel intracranial occlusion or high grade stenosis. 2. No significant stenoses of the internal carotid arteries. 3. Other findings as described. MRI brain without contrast    Result Date: 11/22/2021  EXAMINATION: MRI OF THE BRAIN WITHOUT CONTRAST  11/22/2021 11:53 am TECHNIQUE: Multiplanar multisequence MRI of the brain was performed without the administration of intravenous contrast. COMPARISON: None. HISTORY: ORDERING SYSTEM PROVIDED HISTORY: cva rule out TECHNOLOGIST PROVIDED HISTORY: Reason for exam:->cva rule out Decision Support Exception - unselect if not a suspected or confirmed emergency medical condition->Emergency Medical Condition (MA) Initial evaluation. FINDINGS: INTRACRANIAL STRUCTURES/VENTRICLES: There is no acute infarct. No mass effect or midline shift. No evidence of an acute intracranial hemorrhage. Areas of T2 FLAIR hyperintensity are seen in the periventricular and subcortical white matter, which are nonspecific, but may represent chronic microvascular ischemic change. There is minimal global parenchymal volume loss. The sellar/suprasellar regions appear unremarkable. The normal signal voids within the major intracranial vessels appear maintained. ORBITS: The visualized portion of the orbits demonstrate no acute abnormality. SINUSES: Fluid is seen within the left sphenoid sinus. The mastoid air cells demonstrate no acute abnormality. BONES/SOFT TISSUES: The bone marrow signal intensity appears normal. The soft tissues demonstrate no acute abnormality. 1. No acute intracranial abnormality.   No acute infarct. 2. Minimal global parenchymal volume loss with mild chronic microvascular ischemic changes. 3. Fluid is seen in the left sphenoid sinus. Consults:     IP CONSULT TO HOSPITALIST  IP CONSULT TO NEUROLOGY    Labs: For convenience and continuity at follow-up the following most recent labs are provided:    Lab Results   Component Value Date    WBC 8.3 11/22/2021    HGB 11.4 11/22/2021    HCT 33.5 11/22/2021    MCV 86.6 11/22/2021     11/22/2021     11/21/2021    K 4.4 11/21/2021    K 4.1 11/19/2020    CL 98 11/21/2021    CO2 25 11/21/2021    BUN 20 11/21/2021    CREATININE 1.0 11/21/2021    CALCIUM 9.1 11/21/2021    PHOS 4.0 11/26/2020    TROPONINI <0.01 11/22/2021    ALKPHOS 91 11/21/2021    ALT 18 11/21/2021    AST 25 11/21/2021    BILITOT 0.4 11/21/2021    LABALBU 3.7 11/21/2021    LDLCALC see below 07/24/2019    TRIG 333 07/24/2019    LABA1C 10.0 11/22/2021     Lab Results   Component Value Date    INR 0.99 11/21/2021         The patient was seen and examined on day of discharge and this discharge summary is in conjunction with any daily progress note from day of discharge. Time spent on discharge is more than 30 minutes in the examination, evaluation, counseling and review of medications and discharge plan. Signed:    Tricia Sagastume MD   12/12/2021    Thank you Wyatt Patton DO, DO for the opportunity to be involved in this patient's care. If you have any questions or concerns please feel free to contact my office [phone].

## 2021-11-24 NOTE — PROGRESS NOTES
Patient states that she had multiple loose bowels at beginning of shift. Patient frequently flushed toilet prior to nurse observing. Approximately at [facility] placed to collect specimen. Multiple toileting since then with no bowel movement noted.

## 2021-11-24 NOTE — PROGRESS NOTES
Patient ready for discharge. She was assisted in getting dressed. IV removed. Discharge instructions were given. She verbalized understand of them.  Son at bedside and ambulated with patient to vehicle

## 2021-11-24 NOTE — PLAN OF CARE
Problem: Falls - Risk of:  Goal: Will remain free from falls  Description: Will remain free from falls  Outcome: Completed  Goal: Absence of physical injury  Description: Absence of physical injury  Outcome: Completed     Problem: Pain:  Goal: Pain level will decrease  Description: Pain level will decrease  Outcome: Completed  Goal: Control of acute pain  Description: Control of acute pain  Outcome: Completed  Goal: Control of chronic pain  Description: Control of chronic pain  Outcome: Completed     Problem: HEMODYNAMIC STATUS  Goal: Patient has stable vital signs and fluid balance  Outcome: Completed     Problem: ACTIVITY INTOLERANCE/IMPAIRED MOBILITY  Goal: Mobility/activity is maintained at optimum level for patient  Outcome: Completed     Problem: Nutrition  Goal: Optimal nutrition therapy  Outcome: Completed

## 2021-11-24 NOTE — PROGRESS NOTES
Physical Therapy  Facility/Department: [facility] - MED SURG  Daily Treatment Note  NAME: Joselo Peres  : 1955  MRN: [id number]    Date of Service: 2021    Discharge Recommendations:  Subacute/Skilled Nursing Facility   PT Equipment Recommendations  Equipment Needed: No  Other: defer to facility    Assessment    Body structures, Functions, Activity limitations: Decreased functional mobility ; Decreased balance; Decreased endurance; Decreased coordination  Assessment: Patient seen for gait and transfers. Patient cleared by RN for therapy participation this date. Patient agreeable to therapy. Patient completed transfers with CGA and ambulated >100 ft with RW and CGA with very slow rut. Pt requires cues for navigating and turning d/t vision deficits. P.T .will continue to follow throughout LOS. Recommending DC to SNF d/t safety awareness and endurance as well as pt has 16 ANNA MARIE. Treatment Diagnosis: impaired functional mobility  Specific instructions for Next Treatment: progress mobility as tolerated  Prognosis: Good  Decision Making: Low Complexity  PT Education: Goals; General Safety; Gait Training; PT Role; Disease Specific Education; Plan of Care; Functional Mobility Training; Transfer Training  Patient Education: pt educated on safe hand placement with transfers and safety with navigating RW - requires reinforcement  Barriers to Learning: none  REQUIRES PT FOLLOW UP: Yes  Activity Tolerance  Activity Tolerance: Patient Tolerated treatment well; Patient limited by fatigue; Patient limited by endurance  Activity Tolerance: 98% on RA, 73 bpm, 122/54     Patient Diagnosis(es): The primary encounter diagnosis was Stroke-like symptoms. Diagnoses of Hyperglycemia and Essential hypertension were also pertinent to this visit.      has a past medical history of Bipolar affective disorder ([facility] [address] Coronary artery disease involving native coronary artery of native heart without angina pectoris, COVID-19, Depression, Diabetes ([facility] [address] Hyperlipidemia, Hypertension, Insomnia, and Neuropathy. has a past surgical history that includes Cholecystectomy. Restrictions  Restrictions/Precautions  Restrictions/Precautions: Fall Risk, Up as Tolerated  Subjective   General  Chart Reviewed: Yes  Additional Pertinent Hx: MRI negative for CVA  Response To Previous Treatment: Patient with no complaints from previous session. Family / Caregiver Present: No  Referring Practitioner: Dr. Nick Carlton MD  Subjective  Subjective: pt in recliner, agreeable to therapy  Pain Screening  Patient Currently in Pain: Denies  Vital Signs  Patient Currently in Pain: Denies       Orientation  Orientation  Overall Orientation Status: Within Functional Limits     Objective   Bed mobility  Supine to Sit: Unable to assess  Sit to Supine: Stand by assistance  Transfers  Sit to Stand: Contact guard assistance  Stand to sit: Contact guard assistance  Ambulation  Ambulation?: Yes  Ambulation 1  Surface: level tile  Device: Rolling Walker  Assistance: Contact guard assistance  Quality of Gait: Pt ambulates with slow rut and short step length. Pt denies dizziness. Pt requires cues for navigating d/t vision deficits. Gait Deviations: Shuffles;  Slow Rut; Decreased step length; Decreased step height  Distance: 115 ft, 25 ft, 10 ft  Stairs/Curb  Stairs?: No                              AM-PAC Score  AM-PAC Inpatient Mobility Raw Score : 18 (11/24/21 1129)  AM-PAC Inpatient T-Scale Score : 43.63 (11/24/21 1129)  Mobility Inpatient CMS 0-100% Score: 46.58 (11/24/21 1129)  Mobility Inpatient CMS G-Code Modifier : CK (11/24/21 1129)          Goals  Short term goals  Time Frame for Short term goals: 1 week (11/29) unless otherwise specified  Short term goal 1: Pt will be mod I with bed mobility.: 11/23 CG  Short term goal 2: Pt will be SBA for transfers with LRAD.: 11/23 CG  Short term goal 3: Pt will ambulate 100 ft with SBA and LRAD.: 11/23 CG/ min assist with  ft  Short term goal 4: 11/26: Pt will participate in 12-15 reps of BLE exercises to promote strength and activity tolerance.: 11/23 10-15 reps LE Ex  Patient Goals   Patient goals : \"to get better\"    Plan    Plan  Times per week: 3-5x/wk  Times per day: Daily  Specific instructions for Next Treatment: progress mobility as tolerated  Current Treatment Recommendations: Strengthening, Neuromuscular Re-education, Home Exercise Program, Safety Education & Training, Balance Training, Endurance Training, Functional Mobility Training, Transfer Training, Gait Training, Equipment Evaluation, Education, & procurement, Patient/Caregiver Education & Training, Stair training  Safety Devices  Type of devices: All fall risk precautions in place, Call light within reach, Patient at risk for falls, Gait belt, Nurse notified, Bed alarm in place, Left in bed  Restraints  Initially in place: No     Therapy Time   Individual Concurrent Group Co-treatment   Time In 1050         Time Out 1115         Minutes 25         Timed Code Treatment Minutes: 25 Minutes     If pt is unable to be seen after this session, please let this note serve as discharge summary. Please see case management note for discharge disposition. Thank you.     Jolene Gibbons, PT

## 2021-11-25 LAB — METHYLMALONIC ACID: 0.16 UMOL/L (ref 0–0.4)

## 2021-11-30 NOTE — CARE COORDINATION
nonadmit [facility]    Unable to reach patient    Alissa Jesus RN, BSN CTN  [facility] [phone]

## 2022-04-25 NOTE — PROGRESS NOTES
Pt a/o. Shift assessment updated and documented. VSS. Will continue to monitor. RTC in one year with labs  Refrral to Pulmonary at [facility] Spine appears normal, range of motion is not limited, no muscle or joint tenderness

## 2023-02-22 ENCOUNTER — OFFICE VISIT (OUTPATIENT)
Dept: URGENT CARE | Age: 68
End: 2023-02-22

## 2023-02-22 VITALS
OXYGEN SATURATION: 97 % | SYSTOLIC BLOOD PRESSURE: 173 MMHG | DIASTOLIC BLOOD PRESSURE: 63 MMHG | BODY MASS INDEX: 29.26 KG/M2 | HEIGHT: 62 IN | HEART RATE: 91 BPM | WEIGHT: 159 LBS | TEMPERATURE: 98.5 F

## 2023-02-22 DIAGNOSIS — L97.412 DIABETIC ULCER OF RIGHT MIDFOOT ASSOCIATED WITH TYPE 2 DIABETES MELLITUS, WITH FAT LAYER EXPOSED (HCC): ICD-10-CM

## 2023-02-22 DIAGNOSIS — R03.0 ELEVATED BLOOD PRESSURE READING: Primary | ICD-10-CM

## 2023-02-22 DIAGNOSIS — E11.621 DIABETIC ULCER OF RIGHT MIDFOOT ASSOCIATED WITH TYPE 2 DIABETES MELLITUS, WITH FAT LAYER EXPOSED (HCC): ICD-10-CM

## 2023-02-22 RX ORDER — TIZANIDINE 4 MG/1
TABLET ORAL
COMMUNITY
Start: 2023-02-19

## 2023-02-22 RX ORDER — HYDROCODONE BITARTRATE AND ACETAMINOPHEN 7.5; 325 MG/1; MG/1
1 TABLET ORAL EVERY 6 HOURS PRN
COMMUNITY

## 2023-02-22 ASSESSMENT — ENCOUNTER SYMPTOMS
RESPIRATORY NEGATIVE: 1
NAUSEA: 1

## 2023-02-22 NOTE — PROGRESS NOTES
Hipolito Hoffman (:  1955) is a 74 y.o. female,New patient, here for evaluation of the following chief complaint(s): Foot Ulcer (Diabetic ulcer bottom of right foot, foot and calf swelling. Has been there x 5 months. No treatment. Left foot has small wound on 5ht digit, foot swelling)      ASSESSMENT/PLAN:    ICD-10-CM    1. Elevated blood pressure reading  R03.0       2. Diabetic ulcer of right midfoot associated with type 2 diabetes mellitus, with fat layer exposed ([facility] [place] [address]  P39.837     L97.412         Patient was dropped off here. Referred patient to ER for ongoing diabatic ulcer to right foot that has gotten worse in the past week with elevated blood glucose today  EMS was called for transport due to patient does not have transportation to the ER  Patient left via squad on stretcher, alert and oriented    SUBJECTIVE/OBJECTIVE:  74year old female presents with c/o swollen bilat feet for 10 days. States that it is getting worse in the past 2 days. She rates pain to there right foot 8/10. She has an open wound on the bottom of her right foot that has been there since 2022. States that it is a deep hole in the bottom of her foot that is not healing. She has superficial open wound on her left foot. She was told by her PCP to go to ER for wound evaluation and to evaluate for blood clots. She took her blood sugar 2 hours ago and it was 597. She took 15 units of \"fast acting\" insulin. Blood sugar here now is 247. History provided by:  Patient   used: No      Vitals:    23 1904   BP: (!) 173/63   Site: Right Upper Arm   Position: Sitting   Pulse: 91   Temp: 98.5 °F (36.9 °C)   TempSrc: Oral   SpO2: 97%   Weight: 159 lb (72.1 kg)   Height: 5' 2\" (1.575 m)       Review of Systems   Constitutional:  Positive for appetite change, chills and fatigue. Negative for fever. HENT: Negative. Respiratory: Negative. Cardiovascular: Negative.     Gastrointestinal:  Positive for nausea. Endocrine: Negative. Skin:  Positive for wound. Wound to the bottom of her right foot that has been there since 9/2022  Small superficial wounds to her 5th toe on her left foot   Neurological:  Positive for weakness. Physical Exam  Vitals reviewed. Constitutional:       General: She is not in acute distress. Appearance: Normal appearance. She is ill-appearing. HENT:      Head: Normocephalic. Cardiovascular:      Rate and Rhythm: Normal rate and regular rhythm. Pulses: Normal pulses. Heart sounds: Normal heart sounds. Pulmonary:      Effort: Pulmonary effort is normal. No respiratory distress. Breath sounds: Normal breath sounds. Skin:     General: Skin is warm and dry. Neurological:      Mental Status: She is alert. An electronic signature was used to authenticate this note.     --Tressia Kayser, APRN - CNP

## 2023-07-20 ENCOUNTER — OFFICE VISIT (OUTPATIENT)
Dept: URGENT CARE | Age: 68
End: 2023-07-20

## 2023-07-20 VITALS
BODY MASS INDEX: 28.53 KG/M2 | DIASTOLIC BLOOD PRESSURE: 76 MMHG | SYSTOLIC BLOOD PRESSURE: 129 MMHG | WEIGHT: 161 LBS | OXYGEN SATURATION: 95 % | TEMPERATURE: 99.8 F | HEIGHT: 63 IN | HEART RATE: 98 BPM

## 2023-07-20 DIAGNOSIS — R31.9 URINARY TRACT INFECTION WITH HEMATURIA, SITE UNSPECIFIED: Primary | ICD-10-CM

## 2023-07-20 DIAGNOSIS — N39.0 URINARY TRACT INFECTION WITH HEMATURIA, SITE UNSPECIFIED: Primary | ICD-10-CM

## 2023-07-20 LAB
BILIRUBIN, POC: ABNORMAL
BLOOD URINE, POC: ABNORMAL
CLARITY, POC: ABNORMAL
COLOR, POC: YELLOW
GLUCOSE URINE, POC: ABNORMAL
KETONES, POC: ABNORMAL
LEUKOCYTE EST, POC: ABNORMAL
NITRITE, POC: POSITIVE
PH, POC: 6
PROTEIN, POC: 300
SPECIFIC GRAVITY, POC: 1.01
UROBILINOGEN, POC: ABNORMAL

## 2023-07-21 ASSESSMENT — ENCOUNTER SYMPTOMS
DIARRHEA: 1
VOMITING: 1
NAUSEA: 1

## 2023-07-21 NOTE — PROGRESS NOTES
Bailey Schaefer (:  1955) is a 79 y.o. female,Established patient, here for evaluation of the following chief complaint(s):  Urinary Tract Infection (Worsening )        RESULTS:  Results for POC orders placed in visit on 23   POCT Urinalysis no Micro   Result Value Ref Range    Color, UA yellow     Clarity, UA cloudy     Glucose, UA POC 500mg     Bilirubin, UA neg     Ketones, UA neg     Spec Grav, UA 1.015     Blood, UA POC trace-intact     pH, UA 6.0     Protein, UA      Urobilinogen, UA      Leukocytes, UA small     Nitrite, UA positive          ASSESSMENT/PLAN:    ICD-10-CM    1. Urinary tract infection with hematuria, site unspecified  N39.0 POCT Urinalysis no Micro    R31.9 POCT Urinalysis no Micro          Given 2 courses of antibiotics with continued worsening of symptoms, including back pain, nausea, vomiting, and fevers, and significantly high blood sugars, concern for systemic infection and recommend ER follow up. Given lack of transportation, EMS was contacted for transport to the ED for further evaluation and treatment. SUBJECTIVE/OBJECTIVE:  HPI:   79 y.o. female presents with symptoms of persistent UTI ongoing over the last 2 weeks. States she has a history of frequent UTIS, and notes developing burning with urination and urinary frequency 2 weeks ago, was placed on Doxycycline, then placed on Macrobid when symptoms failed to resolve after initial treatment - of note, chart review only shows documentation of placement on Macrobid on 7/10 via telemed visit. Pt notes over the last 3 days has been having Dizzy spells, nausea, vomiting, chills, fevers, diarrhea, back pain, cramping/muscle spasms, and worsened abdominal pain. Notes still maintaining burning with urination and urinary frequency throughout treatment.     Pt also states she has noticed her blood sugars rising since worsened symptoms began with sugar meter reading sugars as \"HI\" multiple times over the last 3 days -

## 2023-07-21 NOTE — PATIENT INSTRUCTIONS
Given 2 courses of antibiotics with continued worsening of symptoms, including back pain, nausea, vomiting, and fevers, and significantly high blood sugars, concern for systemic infection and recommend ER follow up.